# Patient Record
Sex: MALE | Race: WHITE | Employment: UNEMPLOYED | URBAN - METROPOLITAN AREA
[De-identification: names, ages, dates, MRNs, and addresses within clinical notes are randomized per-mention and may not be internally consistent; named-entity substitution may affect disease eponyms.]

---

## 2023-08-14 ENCOUNTER — OFFICE VISIT (OUTPATIENT)
Dept: FAMILY MEDICINE CLINIC | Age: 68
End: 2023-08-14
Payer: MEDICARE

## 2023-08-14 VITALS
OXYGEN SATURATION: 95 % | BODY MASS INDEX: 37.27 KG/M2 | TEMPERATURE: 97 F | SYSTOLIC BLOOD PRESSURE: 174 MMHG | HEART RATE: 78 BPM | RESPIRATION RATE: 16 BRPM | HEIGHT: 71 IN | WEIGHT: 266.2 LBS | DIASTOLIC BLOOD PRESSURE: 100 MMHG

## 2023-08-14 DIAGNOSIS — T30.0 SKIN BURN: Primary | ICD-10-CM

## 2023-08-14 PROCEDURE — 99214 OFFICE O/P EST MOD 30 MIN: CPT | Performed by: NURSE PRACTITIONER

## 2023-08-14 PROCEDURE — 1123F ACP DISCUSS/DSCN MKR DOCD: CPT | Performed by: NURSE PRACTITIONER

## 2023-08-14 RX ORDER — SULFAMETHOXAZOLE AND TRIMETHOPRIM 800; 160 MG/1; MG/1
1 TABLET ORAL 2 TIMES DAILY
Qty: 20 TABLET | Refills: 0 | Status: SHIPPED | OUTPATIENT
Start: 2023-08-14 | End: 2023-08-24
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
## 2023-09-20 ENCOUNTER — OFFICE VISIT (OUTPATIENT)
Dept: FAMILY MEDICINE CLINIC | Age: 68
End: 2023-09-20
Payer: MEDICARE

## 2023-09-20 VITALS
RESPIRATION RATE: 18 BRPM | BODY MASS INDEX: 37.24 KG/M2 | TEMPERATURE: 97.4 F | OXYGEN SATURATION: 93 % | DIASTOLIC BLOOD PRESSURE: 76 MMHG | WEIGHT: 266 LBS | HEIGHT: 71 IN | HEART RATE: 77 BPM | SYSTOLIC BLOOD PRESSURE: 130 MMHG

## 2023-09-20 DIAGNOSIS — L03.90 WOUND CELLULITIS: Primary | ICD-10-CM

## 2023-09-20 DIAGNOSIS — Z91.199 NON-COMPLIANCE WITH TREATMENT: ICD-10-CM

## 2023-09-20 PROCEDURE — 4004F PT TOBACCO SCREEN RCVD TLK: CPT

## 2023-09-20 PROCEDURE — 3017F COLORECTAL CA SCREEN DOC REV: CPT

## 2023-09-20 PROCEDURE — 1123F ACP DISCUSS/DSCN MKR DOCD: CPT

## 2023-09-20 PROCEDURE — G8417 CALC BMI ABV UP PARAM F/U: HCPCS

## 2023-09-20 PROCEDURE — G8427 DOCREV CUR MEDS BY ELIG CLIN: HCPCS

## 2023-09-20 PROCEDURE — 99214 OFFICE O/P EST MOD 30 MIN: CPT

## 2023-09-20 RX ORDER — SULFAMETHOXAZOLE AND TRIMETHOPRIM 800; 160 MG/1; MG/1
1 TABLET ORAL 2 TIMES DAILY
Qty: 14 TABLET | Refills: 0 | Status: SHIPPED | OUTPATIENT
Start: 2023-09-20 | End: 2023-09-27

## 2023-09-20 SDOH — ECONOMIC STABILITY: HOUSING INSECURITY
IN THE LAST 12 MONTHS, WAS THERE A TIME WHEN YOU DID NOT HAVE A STEADY PLACE TO SLEEP OR SLEPT IN A SHELTER (INCLUDING NOW)?: NO

## 2023-09-20 SDOH — ECONOMIC STABILITY: FOOD INSECURITY: WITHIN THE PAST 12 MONTHS, YOU WORRIED THAT YOUR FOOD WOULD RUN OUT BEFORE YOU GOT MONEY TO BUY MORE.: NEVER TRUE

## 2023-09-20 SDOH — ECONOMIC STABILITY: INCOME INSECURITY: HOW HARD IS IT FOR YOU TO PAY FOR THE VERY BASICS LIKE FOOD, HOUSING, MEDICAL CARE, AND HEATING?: NOT HARD AT ALL

## 2023-09-20 SDOH — ECONOMIC STABILITY: FOOD INSECURITY: WITHIN THE PAST 12 MONTHS, THE FOOD YOU BOUGHT JUST DIDN'T LAST AND YOU DIDN'T HAVE MONEY TO GET MORE.: NEVER TRUE

## 2023-09-20 NOTE — PROGRESS NOTES
walk-in, additionally, I did educate that antibiotics and clearing infection would improve pain. I did offer and recommend x-ray as well as IM Rocephin antibiotics. He declined and is also refusing wound care. Did still place wound care order in case patient changed her mind. ED immediately with the development of fever, body aches, shaking chills, spreading erythema, lymphangitic streaking, lethargy, CP, or SOB. Pt is in agreement with this care plan. All questions answered. SOCO Xie    **This report was transcribed using voice recognition software. Every effort was made to ensure accuracy; however, inadvertent computerized transcription errors may be present.

## 2023-10-12 ENCOUNTER — HOSPITAL ENCOUNTER (INPATIENT)
Age: 68
LOS: 1 days | Discharge: LEFT BEFORE DISCHARGE | End: 2023-10-12
Attending: EMERGENCY MEDICINE | Admitting: STUDENT IN AN ORGANIZED HEALTH CARE EDUCATION/TRAINING PROGRAM
Payer: MEDICARE

## 2023-10-12 ENCOUNTER — APPOINTMENT (OUTPATIENT)
Dept: GENERAL RADIOLOGY | Age: 68
End: 2023-10-12
Payer: MEDICARE

## 2023-10-12 VITALS
RESPIRATION RATE: 22 BRPM | WEIGHT: 230 LBS | SYSTOLIC BLOOD PRESSURE: 190 MMHG | BODY MASS INDEX: 32.08 KG/M2 | HEART RATE: 83 BPM | TEMPERATURE: 97.4 F | DIASTOLIC BLOOD PRESSURE: 100 MMHG | OXYGEN SATURATION: 93 %

## 2023-10-12 DIAGNOSIS — T24.201A PARTIAL THICKNESS BURN OF RIGHT LOWER EXTREMITY, INITIAL ENCOUNTER: Primary | ICD-10-CM

## 2023-10-12 DIAGNOSIS — T24.202A SECOND DEGREE BURN OF LEFT LEG, INITIAL ENCOUNTER: ICD-10-CM

## 2023-10-12 DIAGNOSIS — T14.8XXA WOUND INFECTION: ICD-10-CM

## 2023-10-12 DIAGNOSIS — E87.1 HYPONATREMIA: ICD-10-CM

## 2023-10-12 DIAGNOSIS — L08.9 WOUND INFECTION: ICD-10-CM

## 2023-10-12 PROBLEM — L97.914: Status: ACTIVE | Noted: 2023-10-12

## 2023-10-12 PROBLEM — L97.924: Status: ACTIVE | Noted: 2023-10-12

## 2023-10-12 LAB
ALBUMIN SERPL-MCNC: 4.1 G/DL (ref 3.5–5.2)
ALP SERPL-CCNC: 105 U/L (ref 40–129)
ALT SERPL-CCNC: 54 U/L (ref 0–40)
ANION GAP SERPL CALCULATED.3IONS-SCNC: 15 MMOL/L (ref 7–16)
AST SERPL-CCNC: 42 U/L (ref 0–39)
BASOPHILS # BLD: 0.14 K/UL (ref 0–0.2)
BASOPHILS NFR BLD: 1 % (ref 0–2)
BILIRUB SERPL-MCNC: 0.2 MG/DL (ref 0–1.2)
BUN SERPL-MCNC: 13 MG/DL (ref 6–23)
CALCIUM SERPL-MCNC: 9.9 MG/DL (ref 8.6–10.2)
CHLORIDE SERPL-SCNC: 87 MMOL/L (ref 98–107)
CO2 SERPL-SCNC: 26 MMOL/L (ref 22–29)
CREAT SERPL-MCNC: 0.9 MG/DL (ref 0.7–1.2)
CRP SERPL HS-MCNC: 179 MG/L (ref 0–5)
EOSINOPHIL # BLD: 0.23 K/UL (ref 0.05–0.5)
EOSINOPHILS RELATIVE PERCENT: 1 % (ref 0–6)
ERYTHROCYTE [DISTWIDTH] IN BLOOD BY AUTOMATED COUNT: 14 % (ref 11.5–15)
ERYTHROCYTE [SEDIMENTATION RATE] IN BLOOD BY WESTERGREN METHOD: 80 MM/HR (ref 0–15)
GFR SERPL CREATININE-BSD FRML MDRD: >60 ML/MIN/1.73M2
GLUCOSE SERPL-MCNC: 112 MG/DL (ref 74–99)
HCT VFR BLD AUTO: 41.8 % (ref 37–54)
HGB BLD-MCNC: 14.1 G/DL (ref 12.5–16.5)
IMM GRANULOCYTES # BLD AUTO: 0.47 K/UL (ref 0–0.58)
IMM GRANULOCYTES NFR BLD: 3 % (ref 0–5)
LYMPHOCYTES NFR BLD: 3.29 K/UL (ref 1.5–4)
LYMPHOCYTES RELATIVE PERCENT: 18 % (ref 20–42)
MCH RBC QN AUTO: 30.2 PG (ref 26–35)
MCHC RBC AUTO-ENTMCNC: 33.7 G/DL (ref 32–34.5)
MCV RBC AUTO: 89.5 FL (ref 80–99.9)
MONOCYTES NFR BLD: 0.66 K/UL (ref 0.1–0.95)
MONOCYTES NFR BLD: 4 % (ref 2–12)
NEUTROPHILS NFR BLD: 73 % (ref 43–80)
NEUTS SEG NFR BLD: 13.13 K/UL (ref 1.8–7.3)
PLATELET # BLD AUTO: 446 K/UL (ref 130–450)
PMV BLD AUTO: 8.9 FL (ref 7–12)
POTASSIUM SERPL-SCNC: 4.5 MMOL/L (ref 3.5–5)
PROT SERPL-MCNC: 8.7 G/DL (ref 6.4–8.3)
RBC # BLD AUTO: 4.67 M/UL (ref 3.8–5.8)
SODIUM SERPL-SCNC: 128 MMOL/L (ref 132–146)
WBC OTHER # BLD: 17.9 K/UL (ref 4.5–11.5)

## 2023-10-12 PROCEDURE — 80053 COMPREHEN METABOLIC PANEL: CPT

## 2023-10-12 PROCEDURE — 99285 EMERGENCY DEPT VISIT HI MDM: CPT

## 2023-10-12 PROCEDURE — 87040 BLOOD CULTURE FOR BACTERIA: CPT

## 2023-10-12 PROCEDURE — 87154 CUL TYP ID BLD PTHGN 6+ TRGT: CPT

## 2023-10-12 PROCEDURE — 86140 C-REACTIVE PROTEIN: CPT

## 2023-10-12 PROCEDURE — 90714 TD VACC NO PRESV 7 YRS+ IM: CPT | Performed by: PHYSICIAN ASSISTANT

## 2023-10-12 PROCEDURE — 86403 PARTICLE AGGLUT ANTBDY SCRN: CPT

## 2023-10-12 PROCEDURE — 85652 RBC SED RATE AUTOMATED: CPT

## 2023-10-12 PROCEDURE — 6360000002 HC RX W HCPCS: Performed by: PHYSICIAN ASSISTANT

## 2023-10-12 PROCEDURE — 96372 THER/PROPH/DIAG INJ SC/IM: CPT

## 2023-10-12 PROCEDURE — 2580000003 HC RX 258: Performed by: PHYSICIAN ASSISTANT

## 2023-10-12 PROCEDURE — 87070 CULTURE OTHR SPECIMN AEROBIC: CPT

## 2023-10-12 PROCEDURE — 87205 SMEAR GRAM STAIN: CPT

## 2023-10-12 PROCEDURE — 85025 COMPLETE CBC W/AUTO DIFF WBC: CPT

## 2023-10-12 PROCEDURE — 90471 IMMUNIZATION ADMIN: CPT | Performed by: PHYSICIAN ASSISTANT

## 2023-10-12 PROCEDURE — 73590 X-RAY EXAM OF LOWER LEG: CPT

## 2023-10-12 PROCEDURE — 73610 X-RAY EXAM OF ANKLE: CPT

## 2023-10-12 PROCEDURE — 87075 CULTR BACTERIA EXCEPT BLOOD: CPT

## 2023-10-12 PROCEDURE — 96365 THER/PROPH/DIAG IV INF INIT: CPT

## 2023-10-12 PROCEDURE — 1200000000 HC SEMI PRIVATE

## 2023-10-12 RX ORDER — ENOXAPARIN SODIUM 100 MG/ML
30 INJECTION SUBCUTANEOUS 2 TIMES DAILY
Status: DISCONTINUED | OUTPATIENT
Start: 2023-10-12 | End: 2023-10-12 | Stop reason: HOSPADM

## 2023-10-12 RX ORDER — SODIUM CHLORIDE 9 MG/ML
INJECTION, SOLUTION INTRAVENOUS PRN
Status: DISCONTINUED | OUTPATIENT
Start: 2023-10-12 | End: 2023-10-12 | Stop reason: HOSPADM

## 2023-10-12 RX ORDER — SODIUM CHLORIDE 0.9 % (FLUSH) 0.9 %
5-40 SYRINGE (ML) INJECTION PRN
Status: DISCONTINUED | OUTPATIENT
Start: 2023-10-12 | End: 2023-10-12 | Stop reason: HOSPADM

## 2023-10-12 RX ORDER — CLINDAMYCIN HYDROCHLORIDE 300 MG/1
300 CAPSULE ORAL 3 TIMES DAILY
Qty: 30 CAPSULE | Refills: 0 | Status: SHIPPED | OUTPATIENT
Start: 2023-10-12 | End: 2023-10-22

## 2023-10-12 RX ORDER — HYDRALAZINE HYDROCHLORIDE 20 MG/ML
10 INJECTION INTRAMUSCULAR; INTRAVENOUS EVERY 6 HOURS PRN
Status: DISCONTINUED | OUTPATIENT
Start: 2023-10-12 | End: 2023-10-12 | Stop reason: HOSPADM

## 2023-10-12 RX ORDER — SODIUM CHLORIDE, SODIUM LACTATE, POTASSIUM CHLORIDE, CALCIUM CHLORIDE 600; 310; 30; 20 MG/100ML; MG/100ML; MG/100ML; MG/100ML
INJECTION, SOLUTION INTRAVENOUS CONTINUOUS
Status: DISCONTINUED | OUTPATIENT
Start: 2023-10-12 | End: 2023-10-12 | Stop reason: HOSPADM

## 2023-10-12 RX ORDER — ONDANSETRON 2 MG/ML
4 INJECTION INTRAMUSCULAR; INTRAVENOUS EVERY 6 HOURS PRN
Status: DISCONTINUED | OUTPATIENT
Start: 2023-10-12 | End: 2023-10-12 | Stop reason: HOSPADM

## 2023-10-12 RX ORDER — ACETAMINOPHEN 650 MG/1
650 SUPPOSITORY RECTAL EVERY 6 HOURS PRN
Status: DISCONTINUED | OUTPATIENT
Start: 2023-10-12 | End: 2023-10-12 | Stop reason: HOSPADM

## 2023-10-12 RX ORDER — TETANUS AND DIPHTHERIA TOXOIDS ADSORBED 2; 2 [LF]/.5ML; [LF]/.5ML
0.5 INJECTION INTRAMUSCULAR ONCE
Status: COMPLETED | OUTPATIENT
Start: 2023-10-12 | End: 2023-10-12

## 2023-10-12 RX ORDER — SODIUM CHLORIDE 0.9 % (FLUSH) 0.9 %
5-40 SYRINGE (ML) INJECTION EVERY 12 HOURS SCHEDULED
Status: DISCONTINUED | OUTPATIENT
Start: 2023-10-12 | End: 2023-10-12 | Stop reason: HOSPADM

## 2023-10-12 RX ORDER — ONDANSETRON 4 MG/1
4 TABLET, ORALLY DISINTEGRATING ORAL EVERY 8 HOURS PRN
Status: DISCONTINUED | OUTPATIENT
Start: 2023-10-12 | End: 2023-10-12 | Stop reason: HOSPADM

## 2023-10-12 RX ORDER — ACETAMINOPHEN 325 MG/1
650 TABLET ORAL EVERY 6 HOURS PRN
Status: DISCONTINUED | OUTPATIENT
Start: 2023-10-12 | End: 2023-10-12 | Stop reason: HOSPADM

## 2023-10-12 RX ADMIN — CEFEPIME 2000 MG: 2 INJECTION, POWDER, FOR SOLUTION INTRAVENOUS at 14:39

## 2023-10-12 RX ADMIN — TETANUS AND DIPHTHERIA TOXOIDS ADSORBED 0.5 ML: 2; 2 INJECTION INTRAMUSCULAR at 14:41
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
## 2023-10-12 NOTE — ED NOTES
Bilateral leg wounds cleaned and wrapped. Patient decided to sign out AMA despite Nicole WAN reviewing risks of signing out. Patient wheeled out to vehicle.      Patricia Helm, SAI  10/12/23 [id number]

## 2023-10-12 NOTE — PROGRESS NOTES
Pharmacy Consultation Note  (Antibiotic Dosing and Monitoring)    Initial consult date: 10/12/23  Consulting physician/provider: Dr. Seven Appiah  Drug: Vancomycin  Indication: SST infx    Age/  Gender Height Weight IBW  Allergy Information   68 y.o./male   230 lb (104.3 kg)     Ideal body weight: 75.3 kg (166 lb 0.1 oz)  Adjusted ideal body weight: 86.9 kg (191 lb 9.7 oz)   Metoprolol and Pcn [penicillins]      Renal Function:  Recent Labs     10/12/23  1420   BUN 13   CREATININE 0.9     No intake or output data in the 24 hours ending 10/12/23 1751    Vancomycin Monitoring:  Trough:  No results for input(s): \"VANCOTROUGH\" in the last 72 hours. Random:  No results for input(s): \"VANCORANDOM\" in the last 72 hours. No results for input(s): \"BLOODCULT2\" in the last 72 hours. Historical Cultures:  No results found for: \"ORG\"  No results for input(s): \"BC\" in the last 72 hours. Vancomycin Administration Times:  Recent vancomycin administrations        No vancomycin IV orders with administrations found. Orders not given:            vancomycin (VANCOCIN) 2,000 mg in sodium chloride 0.9 % 500 mL IVPB    vancomycin (VANCOCIN) 1,250 mg in sodium chloride 0.9 % 250 mL IVPB                    Assessment:  Patient is a 79 y.o. male who has been initiated on vancomycin  Estimated Creatinine Clearance: 99 mL/min (based on SCr of 0.9 mg/dL).   To dose vancomycin, pharmacy will be utilizing [address] calculation software for goal AUC/CATHY 400-600 mg/L-hr (predicted AUC/CATHY = 550, Tr =17.9)    Plan:  Vanco 2000 mg loading dose  Will continue vancomycin 1250 mg IV every 12 hours  Will check vancomycin levels when appropriate  Will continue to monitor renal function   Pharmacy to follow      Calli Hines [facility] 10/12/2023 5:51 PM
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
## 2023-10-13 NOTE — ED NOTES
Patient called regarding positive blood cultures 10/13/2023 @ 1946 . No answer, voice mail left with call back number .  Dr Harman/Dr Luan Will notified      Marie SAI Silverio  10/13/23 0508

## 2023-10-14 LAB
MICROORGANISM SPEC CULT: ABNORMAL
MICROORGANISM SPEC CULT: ABNORMAL
MICROORGANISM SPEC CULT: NORMAL
MICROORGANISM/AGENT SPEC: ABNORMAL
SPECIMEN DESCRIPTION: ABNORMAL
SPECIMEN DESCRIPTION: NORMAL

## 2023-10-15 LAB
ACB COMPLEX DNA BLD POS QL NAA+NON-PROBE: NOT DETECTED
B FRAGILIS DNA BLD POS QL NAA+NON-PROBE: NOT DETECTED
C ALBICANS DNA BLD POS QL NAA+NON-PROBE: NOT DETECTED
C AURIS DNA BLD POS QL NAA+NON-PROBE: NOT DETECTED
C GATTII+NEOFOR DNA BLD POS QL NAA+N-PRB: NOT DETECTED
C GLABRATA DNA BLD POS QL NAA+NON-PROBE: NOT DETECTED
C KRUSEI DNA BLD POS QL NAA+NON-PROBE: NOT DETECTED
C PARAP DNA BLD POS QL NAA+NON-PROBE: NOT DETECTED
C TROPICLS DNA BLD POS QL NAA+NON-PROBE: NOT DETECTED
E CLOAC COMP DNA BLD POS NAA+NON-PROBE: NOT DETECTED
E COLI DNA BLD POS QL NAA+NON-PROBE: NOT DETECTED
E FAECALIS DNA BLD POS QL NAA+NON-PROBE: NOT DETECTED
E FAECIUM DNA BLD POS QL NAA+NON-PROBE: NOT DETECTED
ENTEROBACTERALES DNA BLD POS NAA+N-PRB: NOT DETECTED
GP B STREP DNA BLD POS QL NAA+NON-PROBE: NOT DETECTED
HAEM INFLU DNA BLD POS QL NAA+NON-PROBE: NOT DETECTED
K OXYTOCA DNA BLD POS QL NAA+NON-PROBE: NOT DETECTED
KLEBSIELLA SP DNA BLD POS QL NAA+NON-PRB: NOT DETECTED
KLEBSIELLA SP DNA BLD POS QL NAA+NON-PRB: NOT DETECTED
L MONOCYTOG DNA BLD POS QL NAA+NON-PROBE: NOT DETECTED
MICROORGANISM SPEC CULT: ABNORMAL
MICROORGANISM SPEC CULT: ABNORMAL
MICROORGANISM/AGENT SPEC: ABNORMAL
MICROORGANISM/AGENT SPEC: ABNORMAL
N MEN DNA BLD POS QL NAA+NON-PROBE: NOT DETECTED
P AERUGINOSA DNA BLD POS NAA+NON-PROBE: NOT DETECTED
PROTEUS SP DNA BLD POS QL NAA+NON-PROBE: NOT DETECTED
S AUREUS DNA BLD POS QL NAA+NON-PROBE: NOT DETECTED
S AUREUS+CONS DNA BLD POS NAA+NON-PROBE: NOT DETECTED
S EPIDERMIDIS DNA BLD POS QL NAA+NON-PRB: NOT DETECTED
S LUGDUNENSIS DNA BLD POS QL NAA+NON-PRB: NOT DETECTED
S MALTOPHILIA DNA BLD POS QL NAA+NON-PRB: NOT DETECTED
S MARCESCENS DNA BLD POS NAA+NON-PROBE: NOT DETECTED
S PNEUM DNA BLD POS QL NAA+NON-PROBE: NOT DETECTED
S PYO DNA BLD POS QL NAA+NON-PROBE: NOT DETECTED
SALMONELLA DNA BLD POS QL NAA+NON-PROBE: NOT DETECTED
SERVICE CMNT-IMP: ABNORMAL
SERVICE CMNT-IMP: ABNORMAL
SPECIMEN DESCRIPTION: ABNORMAL
SPECIMEN DESCRIPTION: ABNORMAL
STREPTOCOCCUS DNA BLD POS NAA+NON-PROBE: NOT DETECTED

## 2023-10-17 NOTE — DISCHARGE SUMMARY
cyanosis, edema of bilateral lower extremities. Brisk capillary refill. Right lower leg anterior medial aspect with large open wound with ulcer, blackish, dark base malodorous with bloody and purulent discharge  Left leg with necrotic blackish ulcerative eschar with surrounding redness and yellowish-whitish base  Skin: Normal skin color. No rashes or lesions. Neurologic:  Neurovascularly intact without any focal sensory/motor deficits. Cranial nerves: II-XII intact, grossly non-focal.  Psychiatric: Alert and oriented, thought content appropriate, normal insight      Consults:     IP CONSULT TO INTERNAL MEDICINE  IP CONSULT TO GENERAL SURGERY    Significant Diagnostic Studies:   X-ray    Disposition: Left AMA    Discharge Instructions/Follow-up:  -    Code Status:  Prior     Activity: activity as tolerated    Diet:     Condition: Critical, patient left AMA    Labs: For convenience and continuity at follow-up the following most recent labs are provided:      CBC:    Lab Results   Component Value Date/Time    WBC 17.9 10/12/2023 02:20 PM    HGB 14.1 10/12/2023 02:20 PM    HCT 41.8 10/12/2023 02:20 PM     10/12/2023 02:20 PM       Renal:    Lab Results   Component Value Date/Time     10/12/2023 02:20 PM    K 4.5 10/12/2023 02:20 PM    CL 87 10/12/2023 02:20 PM    CO2 26 10/12/2023 02:20 PM    BUN 13 10/12/2023 02:20 PM    CREATININE 0.9 10/12/2023 02:20 PM    CALCIUM 9.9 10/12/2023 02:20 PM       Discharge Medications:     Discharge Medication List as of 10/12/2023  5:53 PM             Details   clindamycin (CLEOCIN) 300 MG capsule Take 1 capsule by mouth 3 times daily for 10 days If not covered by insurance or too costly for patient may substitute clindamycin  mg Caps, 2 caps TID ,QS for 10 days. , Disp-30 capsule, R-0Normal                Details   silver sulfADIAZINE (SILVADENE) 1 % cream Apply topically twice daily. , Disp-400 g, R-0, Normal             Time Spent on discharge is more than 15

## 2023-11-11 LAB
SEND OUT REPORT: NORMAL
TEST NAME: NORMAL

## 2024-02-26 ENCOUNTER — HOSPITAL ENCOUNTER (INPATIENT)
Age: 69
LOS: 6 days | Discharge: NURSING, REHAB OR LONG-TERM CARE | DRG: 474 | End: 2024-03-04
Attending: EMERGENCY MEDICINE | Admitting: INTERNAL MEDICINE
Payer: MEDICARE

## 2024-02-26 DIAGNOSIS — M72.6 NECROTIZING FASCIITIS (HCC): Primary | ICD-10-CM

## 2024-02-26 PROCEDURE — 80179 DRUG ASSAY SALICYLATE: CPT

## 2024-02-26 PROCEDURE — 6360000002 HC RX W HCPCS: Performed by: STUDENT IN AN ORGANIZED HEALTH CARE EDUCATION/TRAINING PROGRAM

## 2024-02-26 PROCEDURE — 83735 ASSAY OF MAGNESIUM: CPT

## 2024-02-26 PROCEDURE — 87636 SARSCOV2 & INF A&B AMP PRB: CPT

## 2024-02-26 PROCEDURE — 86140 C-REACTIVE PROTEIN: CPT

## 2024-02-26 PROCEDURE — G0480 DRUG TEST DEF 1-7 CLASSES: HCPCS

## 2024-02-26 PROCEDURE — 83605 ASSAY OF LACTIC ACID: CPT

## 2024-02-26 PROCEDURE — 83690 ASSAY OF LIPASE: CPT

## 2024-02-26 PROCEDURE — 87154 CUL TYP ID BLD PTHGN 6+ TRGT: CPT

## 2024-02-26 PROCEDURE — 99285 EMERGENCY DEPT VISIT HI MDM: CPT

## 2024-02-26 PROCEDURE — 80143 DRUG ASSAY ACETAMINOPHEN: CPT

## 2024-02-26 PROCEDURE — 87077 CULTURE AEROBIC IDENTIFY: CPT

## 2024-02-26 PROCEDURE — 84443 ASSAY THYROID STIM HORMONE: CPT

## 2024-02-26 PROCEDURE — 85025 COMPLETE CBC W/AUTO DIFF WBC: CPT

## 2024-02-26 PROCEDURE — 85610 PROTHROMBIN TIME: CPT

## 2024-02-26 PROCEDURE — 85652 RBC SED RATE AUTOMATED: CPT

## 2024-02-26 PROCEDURE — 84484 ASSAY OF TROPONIN QUANT: CPT

## 2024-02-26 PROCEDURE — 80053 COMPREHEN METABOLIC PANEL: CPT

## 2024-02-26 PROCEDURE — 82140 ASSAY OF AMMONIA: CPT

## 2024-02-26 PROCEDURE — 96365 THER/PROPH/DIAG IV INF INIT: CPT

## 2024-02-26 PROCEDURE — 87040 BLOOD CULTURE FOR BACTERIA: CPT

## 2024-02-26 PROCEDURE — 80307 DRUG TEST PRSMV CHEM ANLYZR: CPT

## 2024-02-26 RX ORDER — CLINDAMYCIN PHOSPHATE 900 MG/50ML
900 INJECTION, SOLUTION INTRAVENOUS ONCE
Status: COMPLETED | OUTPATIENT
Start: 2024-02-26 | End: 2024-02-27

## 2024-02-26 RX ORDER — TRANEXAMIC ACID 100 MG/ML
INJECTION, SOLUTION INTRAVENOUS
Status: DISPENSED
Start: 2024-02-26 | End: 2024-02-27

## 2024-02-26 RX ADMIN — CLINDAMYCIN IN 5 PERCENT DEXTROSE 900 MG: 18 INJECTION, SOLUTION INTRAVENOUS at 23:55

## 2024-02-26 ASSESSMENT — PAIN - FUNCTIONAL ASSESSMENT: PAIN_FUNCTIONAL_ASSESSMENT: NONE - DENIES PAIN

## 2024-02-27 ENCOUNTER — APPOINTMENT (OUTPATIENT)
Dept: GENERAL RADIOLOGY | Age: 69
DRG: 474 | End: 2024-02-27
Payer: MEDICARE

## 2024-02-27 ENCOUNTER — ANESTHESIA (OUTPATIENT)
Dept: OPERATING ROOM | Age: 69
End: 2024-02-27
Payer: MEDICARE

## 2024-02-27 ENCOUNTER — APPOINTMENT (OUTPATIENT)
Dept: CT IMAGING | Age: 69
DRG: 474 | End: 2024-02-27
Payer: MEDICARE

## 2024-02-27 ENCOUNTER — ANESTHESIA EVENT (OUTPATIENT)
Dept: OPERATING ROOM | Age: 69
End: 2024-02-27
Payer: MEDICARE

## 2024-02-27 PROBLEM — E87.1 HYPONATREMIA: Status: ACTIVE | Noted: 2024-02-27

## 2024-02-27 PROBLEM — L03.119 CELLULITIS OF FOOT: Status: ACTIVE | Noted: 2024-02-27

## 2024-02-27 PROBLEM — M72.6 NECROTIZING FASCIITIS (HCC): Status: ACTIVE | Noted: 2024-02-27

## 2024-02-27 LAB
AADO2: 253.3 MMHG
ALBUMIN SERPL-MCNC: 2.4 G/DL (ref 3.5–5.2)
ALBUMIN SERPL-MCNC: 2.9 G/DL (ref 3.5–5.2)
ALP SERPL-CCNC: 77 U/L (ref 40–129)
ALP SERPL-CCNC: 94 U/L (ref 40–129)
ALT SERPL-CCNC: 11 U/L (ref 0–40)
ALT SERPL-CCNC: 16 U/L (ref 0–40)
AMMONIA PLAS-SCNC: <10 UMOL/L (ref 16–60)
AMPHET UR QL SCN: NEGATIVE
ANION GAP SERPL CALCULATED.3IONS-SCNC: 10 MMOL/L (ref 7–16)
ANION GAP SERPL CALCULATED.3IONS-SCNC: 11 MMOL/L (ref 7–16)
ANION GAP SERPL CALCULATED.3IONS-SCNC: 17 MMOL/L (ref 7–16)
ANION GAP SERPL CALCULATED.3IONS-SCNC: 7 MMOL/L (ref 7–16)
ANION GAP SERPL CALCULATED.3IONS-SCNC: 7 MMOL/L (ref 7–16)
ANION GAP SERPL CALCULATED.3IONS-SCNC: 8 MMOL/L (ref 7–16)
ANION GAP SERPL CALCULATED.3IONS-SCNC: 9 MMOL/L (ref 7–16)
ANION GAP SERPL CALCULATED.3IONS-SCNC: NORMAL MMOL/L (ref 9–17)
APAP SERPL-MCNC: <5 UG/ML (ref 10–30)
AST SERPL-CCNC: 17 U/L (ref 0–39)
AST SERPL-CCNC: 20 U/L (ref 0–39)
B.E.: -0.2 MMOL/L (ref -3–3)
BARBITURATES UR QL SCN: NEGATIVE
BASOPHILS # BLD: 0 K/UL (ref 0–0.2)
BASOPHILS # BLD: 0.06 K/UL (ref 0–0.2)
BASOPHILS # BLD: 0.07 K/UL (ref 0–0.2)
BASOPHILS NFR BLD: 0 % (ref 0–2)
BENZODIAZ UR QL: NEGATIVE
BILIRUB SERPL-MCNC: 0.2 MG/DL (ref 0–1.2)
BILIRUB SERPL-MCNC: 0.5 MG/DL (ref 0–1.2)
BNP SERPL-MCNC: 242 PG/ML (ref 0–125)
BUN BLD-MCNC: 12 MG/DL (ref 6–23)
BUN SERPL-MCNC: 11 MG/DL (ref 6–23)
BUN SERPL-MCNC: 11 MG/DL (ref 6–23)
BUN SERPL-MCNC: 12 MG/DL (ref 6–23)
BUN SERPL-MCNC: 13 MG/DL (ref 6–23)
BUN SERPL-MCNC: 14 MG/DL (ref 6–23)
BUN SERPL-MCNC: NORMAL MG/DL (ref 8–23)
BUN/CREAT SERPL: NORMAL (ref 9–20)
BUPRENORPHINE UR QL: NEGATIVE
CA-I BLD-SCNC: 1.09 MMOL/L (ref 1.15–1.33)
CA-I BLD-SCNC: 1.1 MMOL/L (ref 1.15–1.33)
CALCIUM SERPL-MCNC: 7.3 MG/DL (ref 8.6–10.2)
CALCIUM SERPL-MCNC: 7.5 MG/DL (ref 8.6–10.2)
CALCIUM SERPL-MCNC: 7.6 MG/DL (ref 8.6–10.2)
CALCIUM SERPL-MCNC: 7.6 MG/DL (ref 8.6–10.2)
CALCIUM SERPL-MCNC: 7.8 MG/DL (ref 8.6–10.2)
CALCIUM SERPL-MCNC: 8.4 MG/DL (ref 8.6–10.2)
CALCIUM SERPL-MCNC: 8.9 MG/DL (ref 8.6–10.2)
CALCIUM SERPL-MCNC: NORMAL MG/DL (ref 8.6–10.4)
CANNABINOIDS UR QL SCN: NEGATIVE
CHLORIDE BLD-SCNC: 87 MMOL/L (ref 100–108)
CHLORIDE SERPL-SCNC: 79 MMOL/L (ref 98–107)
CHLORIDE SERPL-SCNC: 83 MMOL/L (ref 98–107)
CHLORIDE SERPL-SCNC: 86 MMOL/L (ref 98–107)
CHLORIDE SERPL-SCNC: 87 MMOL/L (ref 98–107)
CHLORIDE SERPL-SCNC: 90 MMOL/L (ref 98–107)
CHLORIDE SERPL-SCNC: NORMAL MMOL/L (ref 98–107)
CK SERPL-CCNC: 128 U/L (ref 20–200)
CK SERPL-CCNC: 129 U/L (ref 20–200)
CO2 BLD CALC-SCNC: 22 MMOL/L (ref 22–29)
CO2 SERPL-SCNC: 22 MMOL/L (ref 22–29)
CO2 SERPL-SCNC: 23 MMOL/L (ref 22–29)
CO2 SERPL-SCNC: 24 MMOL/L (ref 22–29)
CO2 SERPL-SCNC: 25 MMOL/L (ref 22–29)
CO2 SERPL-SCNC: 26 MMOL/L (ref 22–29)
CO2 SERPL-SCNC: NORMAL MMOL/L (ref 20–31)
COCAINE UR QL SCN: NEGATIVE
COHB: 1.6 % (ref 0–1.5)
CREAT BLD-MCNC: 0.5 MG/DL (ref 0.7–1.2)
CREAT SERPL-MCNC: 0.6 MG/DL (ref 0.7–1.2)
CREAT SERPL-MCNC: 0.7 MG/DL (ref 0.7–1.2)
CREAT SERPL-MCNC: 0.7 MG/DL (ref 0.7–1.2)
CREAT SERPL-MCNC: NORMAL MG/DL (ref 0.7–1.2)
CREAT UR-MCNC: 130.1 MG/DL (ref 40–278)
CRITICAL: ABNORMAL
CRP SERPL HS-MCNC: 210 MG/L (ref 0–5)
DATE ANALYZED: ABNORMAL
DATE OF COLLECTION: ABNORMAL
EGFR, POC: >60 ML/MIN/1.73M2
EKG ATRIAL RATE: 86 BPM
EKG P AXIS: 40 DEGREES
EKG P-R INTERVAL: 200 MS
EKG Q-T INTERVAL: 360 MS
EKG QRS DURATION: 90 MS
EKG QTC CALCULATION (BAZETT): 430 MS
EKG R AXIS: 8 DEGREES
EKG T AXIS: 56 DEGREES
EKG VENTRICULAR RATE: 86 BPM
EOSINOPHIL # BLD: 0 K/UL (ref 0.05–0.5)
EOSINOPHIL # BLD: 0.01 K/UL (ref 0.05–0.5)
EOSINOPHIL # BLD: 0.05 K/UL (ref 0.05–0.5)
EOSINOPHILS RELATIVE PERCENT: 0 % (ref 0–6)
ERYTHROCYTE [DISTWIDTH] IN BLOOD BY AUTOMATED COUNT: 17.6 % (ref 11.5–15)
ERYTHROCYTE [DISTWIDTH] IN BLOOD BY AUTOMATED COUNT: 17.9 % (ref 11.5–15)
ERYTHROCYTE [DISTWIDTH] IN BLOOD BY AUTOMATED COUNT: 18 % (ref 11.5–15)
ERYTHROCYTE [SEDIMENTATION RATE] IN BLOOD BY WESTERGREN METHOD: 90 MM/HR (ref 0–15)
ETHANOLAMINE SERPL-MCNC: <10 MG/DL
FENTANYL UR QL: NEGATIVE
FERRITIN SERPL-MCNC: 154 NG/ML
FIO2: 100 %
FLUAV RNA RESP QL NAA+PROBE: NOT DETECTED
FLUBV RNA RESP QL NAA+PROBE: NOT DETECTED
GFR SERPL CREATININE-BSD FRML MDRD: >60 ML/MIN/1.73M2
GFR SERPL CREATININE-BSD FRML MDRD: NORMAL ML/MIN/1.73M2
GLUCOSE BLD-MCNC: 110 MG/DL (ref 74–99)
GLUCOSE BLD-MCNC: 90 MG/DL (ref 74–99)
GLUCOSE SERPL-MCNC: 109 MG/DL (ref 74–99)
GLUCOSE SERPL-MCNC: 114 MG/DL (ref 74–99)
GLUCOSE SERPL-MCNC: 118 MG/DL (ref 74–99)
GLUCOSE SERPL-MCNC: 119 MG/DL (ref 74–99)
GLUCOSE SERPL-MCNC: 83 MG/DL (ref 74–99)
GLUCOSE SERPL-MCNC: 91 MG/DL (ref 74–99)
GLUCOSE SERPL-MCNC: 97 MG/DL (ref 74–99)
GLUCOSE SERPL-MCNC: NORMAL MG/DL (ref 70–99)
HCO3 VENOUS: 22.6 MMOL/L (ref 22–26)
HCO3: 25.6 MMOL/L (ref 22–26)
HCT VFR BLD AUTO: 21.4 % (ref 37–54)
HCT VFR BLD AUTO: 22 % (ref 37–54)
HCT VFR BLD AUTO: 22.3 % (ref 37–54)
HCT VFR BLD AUTO: 23 % (ref 37–54)
HCT VFR BLD AUTO: 23.4 % (ref 37–54)
HCT VFR BLD AUTO: 27.5 % (ref 37–54)
HGB BLD-MCNC: 6.9 G/DL (ref 12.5–16.5)
HGB BLD-MCNC: 7.1 G/DL (ref 12.5–16.5)
HGB BLD-MCNC: 7.2 G/DL (ref 12.5–16.5)
HGB BLD-MCNC: 7.3 G/DL (ref 12.5–16.5)
HGB BLD-MCNC: 8.8 G/DL (ref 12.5–16.5)
HHB: 0.5 % (ref 0–5)
IMM GRANULOCYTES # BLD AUTO: 0.22 K/UL (ref 0–0.58)
IMM GRANULOCYTES # BLD AUTO: 0.24 K/UL (ref 0–0.58)
IMM GRANULOCYTES NFR BLD: 1 % (ref 0–5)
IMM GRANULOCYTES NFR BLD: 1 % (ref 0–5)
INR PPP: 1.5
IRON SATN MFR SERPL: 48 % (ref 20–55)
IRON SERPL-MCNC: 79 UG/DL (ref 59–158)
LAB: ABNORMAL
LACTATE BLDV-SCNC: 1.3 MMOL/L (ref 0.5–1.9)
LACTATE BLDV-SCNC: 1.6 MMOL/L (ref 0.5–1.9)
LIPASE SERPL-CCNC: 22 U/L (ref 13–60)
LYMPHOCYTES NFR BLD: 0.55 K/UL (ref 1.5–4)
LYMPHOCYTES NFR BLD: 2.6 K/UL (ref 1.5–4)
LYMPHOCYTES NFR BLD: 2.74 K/UL (ref 1.5–4)
LYMPHOCYTES RELATIVE PERCENT: 14 % (ref 20–42)
LYMPHOCYTES RELATIVE PERCENT: 15 % (ref 20–42)
LYMPHOCYTES RELATIVE PERCENT: 3 % (ref 20–42)
Lab: 1710
MAGNESIUM SERPL-MCNC: 1.8 MG/DL (ref 1.6–2.6)
MAGNESIUM SERPL-MCNC: 2 MG/DL (ref 1.6–2.6)
MCH RBC QN AUTO: 23.3 PG (ref 26–35)
MCH RBC QN AUTO: 23.3 PG (ref 26–35)
MCH RBC QN AUTO: 23.7 PG (ref 26–35)
MCHC RBC AUTO-ENTMCNC: 31.8 G/DL (ref 32–34.5)
MCHC RBC AUTO-ENTMCNC: 32 G/DL (ref 32–34.5)
MCHC RBC AUTO-ENTMCNC: 32.2 G/DL (ref 32–34.5)
MCV RBC AUTO: 72.3 FL (ref 80–99.9)
MCV RBC AUTO: 72.8 FL (ref 80–99.9)
MCV RBC AUTO: 74.3 FL (ref 80–99.9)
METHADONE UR QL: NEGATIVE
METHB: 0.1 % (ref 0–1.5)
MODE: AC
MONOCYTES NFR BLD: 0 % (ref 2–12)
MONOCYTES NFR BLD: 0 K/UL (ref 0.1–0.95)
MONOCYTES NFR BLD: 1.26 K/UL (ref 0.1–0.95)
MONOCYTES NFR BLD: 1.3 K/UL (ref 0.1–0.95)
MONOCYTES NFR BLD: 7 % (ref 2–12)
MONOCYTES NFR BLD: 7 % (ref 2–12)
NEGATIVE BASE EXCESS, VEN: 2.8 MMOL/L
NEUTROPHILS NFR BLD: 76 % (ref 43–80)
NEUTROPHILS NFR BLD: 77 % (ref 43–80)
NEUTROPHILS NFR BLD: 97 % (ref 43–80)
NEUTS SEG NFR BLD: 13.65 K/UL (ref 1.8–7.3)
NEUTS SEG NFR BLD: 14.19 K/UL (ref 1.8–7.3)
NEUTS SEG NFR BLD: 20.65 K/UL (ref 1.8–7.3)
O2 CONTENT: 12.1 ML/DL
O2 SAT, VEN: 95.7 %
O2 SATURATION: 99.5 % (ref 92–98.5)
O2HB: 97.8 % (ref 94–97)
OPERATOR ID: 1768
OPIATES UR QL SCN: NEGATIVE
OSMOLALITY SERPL: 251 MOSM/KG (ref 285–310)
OSMOLALITY UR: 251 MOSM/KG (ref 300–900)
OSMOLALITY UR: 537 MOSM/KG (ref 300–900)
OXYCODONE UR QL SCN: NEGATIVE
PATIENT TEMP: 37 C
PATIENT TEMP: 38.1
PCO2, VEN: 41 MM HG
PCO2: 47.8 MMHG (ref 35–45)
PCP UR QL SCN: NEGATIVE
PEEP/CPAP: 8 CMH2O
PFO2: 3.87 MMHG/%
PH BLOOD GAS: 7.35 (ref 7.35–7.45)
PH VENOUS: 7.35 (ref 7.35–7.45)
PHOSPHATE SERPL-MCNC: 3.4 MG/DL (ref 2.5–4.5)
PLATELET # BLD AUTO: 463 K/UL (ref 130–450)
PLATELET # BLD AUTO: 498 K/UL (ref 130–450)
PLATELET # BLD AUTO: 617 K/UL (ref 130–450)
PMV BLD AUTO: 8.2 FL (ref 7–12)
PMV BLD AUTO: 8.2 FL (ref 7–12)
PMV BLD AUTO: 8.3 FL (ref 7–12)
PO2, VEN: 84 MM HG
PO2: 386.9 MMHG (ref 75–100)
POC ANION GAP: 11 MMOL/L (ref 7–16)
POC HEMOGLOBIN (CALC): 7.8 G/DL (ref 12.5–15.5)
POC LACTIC ACID: 0.6 MMOL/L (ref 0.5–2.2)
POC PCO2 TEMP: 43 MM HG
POC PH TEMP: 7.33
POC PO2 TEMP: 90.2 MM HG
POTASSIUM BLD-SCNC: 4 MMOL/L (ref 3.5–5)
POTASSIUM SERPL-SCNC: 4.4 MMOL/L (ref 3.5–5)
POTASSIUM SERPL-SCNC: 4.6 MMOL/L (ref 3.5–5)
POTASSIUM SERPL-SCNC: 4.6 MMOL/L (ref 3.5–5)
POTASSIUM SERPL-SCNC: 4.7 MMOL/L (ref 3.5–5)
POTASSIUM SERPL-SCNC: 4.7 MMOL/L (ref 3.5–5)
POTASSIUM SERPL-SCNC: 4.9 MMOL/L (ref 3.5–5)
POTASSIUM SERPL-SCNC: 5.2 MMOL/L (ref 3.5–5)
POTASSIUM SERPL-SCNC: NORMAL MMOL/L (ref 3.7–5.3)
PROT SERPL-MCNC: 5.7 G/DL (ref 6.4–8.3)
PROT SERPL-MCNC: 7.1 G/DL (ref 6.4–8.3)
PROTHROMBIN TIME: 15.9 SEC (ref 9.3–12.4)
RBC # BLD AUTO: 2.96 M/UL (ref 3.8–5.8)
RBC # BLD AUTO: 3 M/UL (ref 3.8–5.8)
RBC # BLD AUTO: 3.78 M/UL (ref 3.8–5.8)
RBC # BLD: ABNORMAL 10*6/UL
RI(T): 0.65
RR MECHANICAL: 12 B/MIN
SALICYLATES SERPL-MCNC: <0.3 MG/DL (ref 0–30)
SARS-COV-2 RNA RESP QL NAA+PROBE: NOT DETECTED
SODIUM BLD-SCNC: 120 MMOL/L (ref 132–146)
SODIUM SERPL-SCNC: 117 MMOL/L (ref 132–146)
SODIUM SERPL-SCNC: 118 MMOL/L (ref 132–146)
SODIUM SERPL-SCNC: 119 MMOL/L (ref 132–146)
SODIUM SERPL-SCNC: 120 MMOL/L (ref 132–146)
SODIUM SERPL-SCNC: 120 MMOL/L (ref 132–146)
SODIUM SERPL-SCNC: 121 MMOL/L (ref 132–146)
SODIUM SERPL-SCNC: 123 MMOL/L (ref 132–146)
SODIUM SERPL-SCNC: NORMAL MMOL/L (ref 135–144)
SODIUM UR-SCNC: 24 MMOL/L
SODIUM UR-SCNC: <20 MMOL/L
SOURCE, BLOOD GAS: ABNORMAL
SOURCE: NORMAL
SPECIMEN DESCRIPTION: NORMAL
T4 FREE SERPL-MCNC: 0.8 NG/DL (ref 0.9–1.7)
TEST INFORMATION: NORMAL
THB: 8 G/DL (ref 11.5–16.5)
TIBC SERPL-MCNC: 164 UG/DL (ref 250–450)
TIME ANALYZED: 1712
TOTAL PROTEIN, URINE: 38 MG/DL (ref 0–12)
TOXIC TRICYCLIC SC,BLOOD: NEGATIVE
TROPONIN I SERPL HS-MCNC: 50 NG/L (ref 0–11)
TROPONIN I SERPL HS-MCNC: 57 NG/L (ref 0–11)
TROPONIN I SERPL HS-MCNC: 59 NG/L (ref 0–11)
TROPONIN I SERPL HS-MCNC: 68 NG/L (ref 0–11)
TROPONIN I SERPL HS-MCNC: NORMAL NG/L (ref 0–22)
TROPONIN INTERP: NORMAL
TROPONIN T SERPL-MCNC: NORMAL NG/ML
TSH SERPL DL<=0.05 MIU/L-ACNC: 18.61 UIU/ML (ref 0.27–4.2)
UUN UR-MCNC: 842 MG/DL (ref 800–1666)
VT MECHANICAL: 500 ML
WBC OTHER # BLD: 17.9 K/UL (ref 4.5–11.5)
WBC OTHER # BLD: 18.5 K/UL (ref 4.5–11.5)
WBC OTHER # BLD: 21.2 K/UL (ref 4.5–11.5)

## 2024-02-27 PROCEDURE — 80047 BASIC METABLC PNL IONIZED CA: CPT

## 2024-02-27 PROCEDURE — 6360000002 HC RX W HCPCS

## 2024-02-27 PROCEDURE — 6360000002 HC RX W HCPCS: Performed by: STUDENT IN AN ORGANIZED HEALTH CARE EDUCATION/TRAINING PROGRAM

## 2024-02-27 PROCEDURE — 2580000003 HC RX 258

## 2024-02-27 PROCEDURE — 71045 X-RAY EXAM CHEST 1 VIEW: CPT

## 2024-02-27 PROCEDURE — P9016 RBC LEUKOCYTES REDUCED: HCPCS

## 2024-02-27 PROCEDURE — 6370000000 HC RX 637 (ALT 250 FOR IP)

## 2024-02-27 PROCEDURE — 83935 ASSAY OF URINE OSMOLALITY: CPT

## 2024-02-27 PROCEDURE — 36620 INSERTION CATHETER ARTERY: CPT

## 2024-02-27 PROCEDURE — 5A1945Z RESPIRATORY VENTILATION, 24-96 CONSECUTIVE HOURS: ICD-10-PCS | Performed by: STUDENT IN AN ORGANIZED HEALTH CARE EDUCATION/TRAINING PROGRAM

## 2024-02-27 PROCEDURE — 82550 ASSAY OF CK (CPK): CPT

## 2024-02-27 PROCEDURE — 74018 RADEX ABDOMEN 1 VIEW: CPT

## 2024-02-27 PROCEDURE — 87077 CULTURE AEROBIC IDENTIFY: CPT

## 2024-02-27 PROCEDURE — 88311 DECALCIFY TISSUE: CPT

## 2024-02-27 PROCEDURE — 82962 GLUCOSE BLOOD TEST: CPT

## 2024-02-27 PROCEDURE — 87205 SMEAR GRAM STAIN: CPT

## 2024-02-27 PROCEDURE — 73700 CT LOWER EXTREMITY W/O DYE: CPT

## 2024-02-27 PROCEDURE — 86403 PARTICLE AGGLUT ANTBDY SCRN: CPT

## 2024-02-27 PROCEDURE — 80048 BASIC METABOLIC PNL TOTAL CA: CPT

## 2024-02-27 PROCEDURE — 37799 UNLISTED PX VASCULAR SURGERY: CPT

## 2024-02-27 PROCEDURE — 99291 CRITICAL CARE FIRST HOUR: CPT | Performed by: SURGERY

## 2024-02-27 PROCEDURE — 3600000013 HC SURGERY LEVEL 3 ADDTL 15MIN: Performed by: SURGERY

## 2024-02-27 PROCEDURE — 87081 CULTURE SCREEN ONLY: CPT

## 2024-02-27 PROCEDURE — 11045 DBRDMT SUBQ TISS EACH ADDL: CPT | Performed by: SURGERY

## 2024-02-27 PROCEDURE — 80053 COMPREHEN METABOLIC PANEL: CPT

## 2024-02-27 PROCEDURE — 86850 RBC ANTIBODY SCREEN: CPT

## 2024-02-27 PROCEDURE — 83930 ASSAY OF BLOOD OSMOLALITY: CPT

## 2024-02-27 PROCEDURE — 2580000003 HC RX 258: Performed by: INTERNAL MEDICINE

## 2024-02-27 PROCEDURE — 86901 BLOOD TYPING SEROLOGIC RH(D): CPT

## 2024-02-27 PROCEDURE — 11044 DBRDMT BONE 1ST 20 SQ CM/<: CPT | Performed by: SURGERY

## 2024-02-27 PROCEDURE — 82570 ASSAY OF URINE CREATININE: CPT

## 2024-02-27 PROCEDURE — 6360000002 HC RX W HCPCS: Performed by: INTERNAL MEDICINE

## 2024-02-27 PROCEDURE — 83550 IRON BINDING TEST: CPT

## 2024-02-27 PROCEDURE — 27882 AMPUTATION OF LOWER LEG: CPT | Performed by: SURGERY

## 2024-02-27 PROCEDURE — 73630 X-RAY EXAM OF FOOT: CPT

## 2024-02-27 PROCEDURE — 84439 ASSAY OF FREE THYROXINE: CPT

## 2024-02-27 PROCEDURE — 84156 ASSAY OF PROTEIN URINE: CPT

## 2024-02-27 PROCEDURE — 30233N1 TRANSFUSION OF NONAUTOLOGOUS RED BLOOD CELLS INTO PERIPHERAL VEIN, PERCUTANEOUS APPROACH: ICD-10-PCS | Performed by: STUDENT IN AN ORGANIZED HEALTH CARE EDUCATION/TRAINING PROGRAM

## 2024-02-27 PROCEDURE — 3700000001 HC ADD 15 MINUTES (ANESTHESIA): Performed by: SURGERY

## 2024-02-27 PROCEDURE — 87181 SC STD AGAR DILUTION PER AGT: CPT

## 2024-02-27 PROCEDURE — 82805 BLOOD GASES W/O2 SATURATION: CPT

## 2024-02-27 PROCEDURE — 5A1955Z RESPIRATORY VENTILATION, GREATER THAN 96 CONSECUTIVE HOURS: ICD-10-PCS | Performed by: STUDENT IN AN ORGANIZED HEALTH CARE EDUCATION/TRAINING PROGRAM

## 2024-02-27 PROCEDURE — 87185 SC STD ENZYME DETCJ PER NZM: CPT

## 2024-02-27 PROCEDURE — 2580000003 HC RX 258: Performed by: STUDENT IN AN ORGANIZED HEALTH CARE EDUCATION/TRAINING PROGRAM

## 2024-02-27 PROCEDURE — 0QBM0ZX EXCISION OF LEFT TARSAL, OPEN APPROACH, DIAGNOSTIC: ICD-10-PCS | Performed by: SURGERY

## 2024-02-27 PROCEDURE — 84300 ASSAY OF URINE SODIUM: CPT

## 2024-02-27 PROCEDURE — 82803 BLOOD GASES ANY COMBINATION: CPT

## 2024-02-27 PROCEDURE — 0BH17EZ INSERTION OF ENDOTRACHEAL AIRWAY INTO TRACHEA, VIA NATURAL OR ARTIFICIAL OPENING: ICD-10-PCS | Performed by: STUDENT IN AN ORGANIZED HEALTH CARE EDUCATION/TRAINING PROGRAM

## 2024-02-27 PROCEDURE — 84484 ASSAY OF TROPONIN QUANT: CPT

## 2024-02-27 PROCEDURE — 86900 BLOOD TYPING SEROLOGIC ABO: CPT

## 2024-02-27 PROCEDURE — 86923 COMPATIBILITY TEST ELECTRIC: CPT

## 2024-02-27 PROCEDURE — 99291 CRITICAL CARE FIRST HOUR: CPT | Performed by: INTERNAL MEDICINE

## 2024-02-27 PROCEDURE — 2709999900 HC NON-CHARGEABLE SUPPLY: Performed by: SURGERY

## 2024-02-27 PROCEDURE — 3600000003 HC SURGERY LEVEL 3 BASE: Performed by: SURGERY

## 2024-02-27 PROCEDURE — 11042 DBRDMT SUBQ TIS 1ST 20SQCM/<: CPT | Performed by: SURGERY

## 2024-02-27 PROCEDURE — 88307 TISSUE EXAM BY PATHOLOGIST: CPT

## 2024-02-27 PROCEDURE — 83605 ASSAY OF LACTIC ACID: CPT

## 2024-02-27 PROCEDURE — 99223 1ST HOSP IP/OBS HIGH 75: CPT | Performed by: INTERNAL MEDICINE

## 2024-02-27 PROCEDURE — 73590 X-RAY EXAM OF LOWER LEG: CPT

## 2024-02-27 PROCEDURE — 0Y6H0Z3 DETACHMENT AT RIGHT LOWER LEG, LOW, OPEN APPROACH: ICD-10-PCS | Performed by: SURGERY

## 2024-02-27 PROCEDURE — 0Y6H0Z1 DETACHMENT AT RIGHT LOWER LEG, HIGH, OPEN APPROACH: ICD-10-PCS | Performed by: SURGERY

## 2024-02-27 PROCEDURE — 85014 HEMATOCRIT: CPT

## 2024-02-27 PROCEDURE — 87176 TISSUE HOMOGENIZATION CULTR: CPT

## 2024-02-27 PROCEDURE — 93005 ELECTROCARDIOGRAM TRACING: CPT | Performed by: EMERGENCY MEDICINE

## 2024-02-27 PROCEDURE — 83735 ASSAY OF MAGNESIUM: CPT

## 2024-02-27 PROCEDURE — 85018 HEMOGLOBIN: CPT

## 2024-02-27 PROCEDURE — 82728 ASSAY OF FERRITIN: CPT

## 2024-02-27 PROCEDURE — 0QBM0ZZ EXCISION OF LEFT TARSAL, OPEN APPROACH: ICD-10-PCS | Performed by: SURGERY

## 2024-02-27 PROCEDURE — 96366 THER/PROPH/DIAG IV INF ADDON: CPT

## 2024-02-27 PROCEDURE — 2000000000 HC ICU R&B

## 2024-02-27 PROCEDURE — 82330 ASSAY OF CALCIUM: CPT

## 2024-02-27 PROCEDURE — 83880 ASSAY OF NATRIURETIC PEPTIDE: CPT

## 2024-02-27 PROCEDURE — 87070 CULTURE OTHR SPECIMN AEROBIC: CPT

## 2024-02-27 PROCEDURE — A4216 STERILE WATER/SALINE, 10 ML: HCPCS

## 2024-02-27 PROCEDURE — 3700000000 HC ANESTHESIA ATTENDED CARE: Performed by: SURGERY

## 2024-02-27 PROCEDURE — 87075 CULTR BACTERIA EXCEPT BLOOD: CPT

## 2024-02-27 PROCEDURE — 99222 1ST HOSP IP/OBS MODERATE 55: CPT | Performed by: INTERNAL MEDICINE

## 2024-02-27 PROCEDURE — 96367 TX/PROPH/DG ADDL SEQ IV INF: CPT

## 2024-02-27 PROCEDURE — 2500000003 HC RX 250 WO HCPCS

## 2024-02-27 PROCEDURE — 84100 ASSAY OF PHOSPHORUS: CPT

## 2024-02-27 PROCEDURE — 85025 COMPLETE CBC W/AUTO DIFF WBC: CPT

## 2024-02-27 PROCEDURE — 94002 VENT MGMT INPAT INIT DAY: CPT

## 2024-02-27 PROCEDURE — 80307 DRUG TEST PRSMV CHEM ANLYZR: CPT

## 2024-02-27 PROCEDURE — 83540 ASSAY OF IRON: CPT

## 2024-02-27 PROCEDURE — 93010 ELECTROCARDIOGRAM REPORT: CPT | Performed by: INTERNAL MEDICINE

## 2024-02-27 PROCEDURE — 36430 TRANSFUSION BLD/BLD COMPNT: CPT

## 2024-02-27 PROCEDURE — 84540 ASSAY OF URINE/UREA-N: CPT

## 2024-02-27 PROCEDURE — 87102 FUNGUS ISOLATION CULTURE: CPT

## 2024-02-27 RX ORDER — SODIUM CHLORIDE 9 MG/ML
INJECTION, SOLUTION INTRAVENOUS PRN
Status: DISCONTINUED | OUTPATIENT
Start: 2024-02-27 | End: 2024-02-28

## 2024-02-27 RX ORDER — OXYCODONE HYDROCHLORIDE 10 MG/1
10 TABLET ORAL EVERY 4 HOURS PRN
Status: DISCONTINUED | OUTPATIENT
Start: 2024-02-27 | End: 2024-03-04 | Stop reason: HOSPADM

## 2024-02-27 RX ORDER — FENTANYL CITRATE 50 UG/ML
INJECTION, SOLUTION INTRAMUSCULAR; INTRAVENOUS PRN
Status: DISCONTINUED | OUTPATIENT
Start: 2024-02-27 | End: 2024-02-27 | Stop reason: SDUPTHER

## 2024-02-27 RX ORDER — 3% SODIUM CHLORIDE 3 G/100ML
25 INJECTION, SOLUTION INTRAVENOUS CONTINUOUS
Status: DISPENSED | OUTPATIENT
Start: 2024-02-27 | End: 2024-02-27

## 2024-02-27 RX ORDER — SODIUM CHLORIDE 9 MG/ML
INJECTION, SOLUTION INTRAVENOUS CONTINUOUS PRN
Status: DISCONTINUED | OUTPATIENT
Start: 2024-02-27 | End: 2024-02-27 | Stop reason: SDUPTHER

## 2024-02-27 RX ORDER — SODIUM CHLORIDE 0.9 % (FLUSH) 0.9 %
5-40 SYRINGE (ML) INJECTION EVERY 12 HOURS SCHEDULED
Status: DISCONTINUED | OUTPATIENT
Start: 2024-02-27 | End: 2024-03-04

## 2024-02-27 RX ORDER — MIDAZOLAM HYDROCHLORIDE 1 MG/ML
INJECTION INTRAMUSCULAR; INTRAVENOUS PRN
Status: DISCONTINUED | OUTPATIENT
Start: 2024-02-27 | End: 2024-02-27 | Stop reason: SDUPTHER

## 2024-02-27 RX ORDER — SODIUM CHLORIDE 9 MG/ML
INJECTION, SOLUTION INTRAVENOUS PRN
Status: DISCONTINUED | OUTPATIENT
Start: 2024-02-27 | End: 2024-02-27

## 2024-02-27 RX ORDER — HYDROMORPHONE HYDROCHLORIDE 2 MG/ML
INJECTION, SOLUTION INTRAMUSCULAR; INTRAVENOUS; SUBCUTANEOUS PRN
Status: DISCONTINUED | OUTPATIENT
Start: 2024-02-27 | End: 2024-02-27 | Stop reason: SDUPTHER

## 2024-02-27 RX ORDER — ACETAMINOPHEN 650 MG/1
650 SUPPOSITORY RECTAL EVERY 6 HOURS PRN
Status: DISCONTINUED | OUTPATIENT
Start: 2024-02-27 | End: 2024-03-04 | Stop reason: HOSPADM

## 2024-02-27 RX ORDER — POTASSIUM CHLORIDE 7.45 MG/ML
10 INJECTION INTRAVENOUS PRN
Status: DISCONTINUED | OUTPATIENT
Start: 2024-02-27 | End: 2024-02-27

## 2024-02-27 RX ORDER — PROPOFOL 10 MG/ML
INJECTION, EMULSION INTRAVENOUS PRN
Status: DISCONTINUED | OUTPATIENT
Start: 2024-02-27 | End: 2024-02-27 | Stop reason: SDUPTHER

## 2024-02-27 RX ORDER — DEXAMETHASONE SODIUM PHOSPHATE 10 MG/ML
INJECTION INTRAMUSCULAR; INTRAVENOUS PRN
Status: DISCONTINUED | OUTPATIENT
Start: 2024-02-27 | End: 2024-02-27 | Stop reason: SDUPTHER

## 2024-02-27 RX ORDER — SODIUM CHLORIDE 0.9 % (FLUSH) 0.9 %
5-40 SYRINGE (ML) INJECTION PRN
Status: DISCONTINUED | OUTPATIENT
Start: 2024-02-27 | End: 2024-02-28

## 2024-02-27 RX ORDER — SENNOSIDES A AND B 8.6 MG/1
1 TABLET, FILM COATED ORAL DAILY PRN
Status: DISCONTINUED | OUTPATIENT
Start: 2024-02-27 | End: 2024-03-04 | Stop reason: HOSPADM

## 2024-02-27 RX ORDER — SODIUM CHLORIDE 0.9 % (FLUSH) 0.9 %
5-40 SYRINGE (ML) INJECTION EVERY 12 HOURS SCHEDULED
Status: DISCONTINUED | OUTPATIENT
Start: 2024-02-27 | End: 2024-02-28

## 2024-02-27 RX ORDER — MAGNESIUM SULFATE IN WATER 40 MG/ML
2000 INJECTION, SOLUTION INTRAVENOUS PRN
Status: DISCONTINUED | OUTPATIENT
Start: 2024-02-27 | End: 2024-02-27

## 2024-02-27 RX ORDER — CHLORHEXIDINE GLUCONATE ORAL RINSE 1.2 MG/ML
15 SOLUTION DENTAL 2 TIMES DAILY
Status: DISCONTINUED | OUTPATIENT
Start: 2024-02-27 | End: 2024-03-04

## 2024-02-27 RX ORDER — FENTANYL CITRATE-0.9 % NACL/PF 10 MCG/ML
25-200 PLASTIC BAG, INJECTION (ML) INTRAVENOUS CONTINUOUS
Status: DISCONTINUED | OUTPATIENT
Start: 2024-02-27 | End: 2024-03-03

## 2024-02-27 RX ORDER — ONDANSETRON 4 MG/1
4 TABLET, ORALLY DISINTEGRATING ORAL EVERY 8 HOURS PRN
Status: DISCONTINUED | OUTPATIENT
Start: 2024-02-27 | End: 2024-03-04 | Stop reason: HOSPADM

## 2024-02-27 RX ORDER — ACETAMINOPHEN 325 MG/1
650 TABLET ORAL EVERY 6 HOURS PRN
Status: DISCONTINUED | OUTPATIENT
Start: 2024-02-27 | End: 2024-03-04 | Stop reason: HOSPADM

## 2024-02-27 RX ORDER — LEVOTHYROXINE SODIUM 0.03 MG/1
50 TABLET ORAL DAILY
Status: DISCONTINUED | OUTPATIENT
Start: 2024-02-27 | End: 2024-03-04 | Stop reason: HOSPADM

## 2024-02-27 RX ORDER — SODIUM CHLORIDE 9 MG/ML
INJECTION, SOLUTION INTRAVENOUS CONTINUOUS
Status: DISCONTINUED | OUTPATIENT
Start: 2024-02-27 | End: 2024-02-27

## 2024-02-27 RX ORDER — POTASSIUM CHLORIDE 20 MEQ/1
40 TABLET, EXTENDED RELEASE ORAL PRN
Status: DISCONTINUED | OUTPATIENT
Start: 2024-02-27 | End: 2024-02-27

## 2024-02-27 RX ORDER — ONDANSETRON 2 MG/ML
4 INJECTION INTRAMUSCULAR; INTRAVENOUS EVERY 6 HOURS PRN
Status: DISCONTINUED | OUTPATIENT
Start: 2024-02-27 | End: 2024-03-04 | Stop reason: HOSPADM

## 2024-02-27 RX ORDER — CLINDAMYCIN PHOSPHATE 900 MG/50ML
900 INJECTION, SOLUTION INTRAVENOUS EVERY 8 HOURS
Status: DISCONTINUED | OUTPATIENT
Start: 2024-02-27 | End: 2024-02-28

## 2024-02-27 RX ORDER — METHYL SALICYLATE
OIL (ML) MISCELLANEOUS PRN
Status: DISCONTINUED | OUTPATIENT
Start: 2024-02-27 | End: 2024-03-04 | Stop reason: HOSPADM

## 2024-02-27 RX ORDER — MAGNESIUM HYDROXIDE/ALUMINUM HYDROXICE/SIMETHICONE 120; 1200; 1200 MG/30ML; MG/30ML; MG/30ML
30 SUSPENSION ORAL EVERY 6 HOURS PRN
Status: DISCONTINUED | OUTPATIENT
Start: 2024-02-27 | End: 2024-03-04 | Stop reason: HOSPADM

## 2024-02-27 RX ORDER — HYDRALAZINE HYDROCHLORIDE 20 MG/ML
10 INJECTION INTRAMUSCULAR; INTRAVENOUS EVERY 6 HOURS PRN
Status: DISCONTINUED | OUTPATIENT
Start: 2024-02-27 | End: 2024-03-04 | Stop reason: HOSPADM

## 2024-02-27 RX ORDER — ROCURONIUM BROMIDE 10 MG/ML
INJECTION, SOLUTION INTRAVENOUS PRN
Status: DISCONTINUED | OUTPATIENT
Start: 2024-02-27 | End: 2024-02-27 | Stop reason: SDUPTHER

## 2024-02-27 RX ORDER — MIDAZOLAM HYDROCHLORIDE 1 MG/ML
1-10 INJECTION, SOLUTION INTRAVENOUS CONTINUOUS
Status: DISCONTINUED | OUTPATIENT
Start: 2024-02-27 | End: 2024-03-03

## 2024-02-27 RX ORDER — ETOMIDATE 2 MG/ML
INJECTION INTRAVENOUS PRN
Status: DISCONTINUED | OUTPATIENT
Start: 2024-02-27 | End: 2024-02-27 | Stop reason: SDUPTHER

## 2024-02-27 RX ORDER — SODIUM CHLORIDE 0.9 % (FLUSH) 0.9 %
5-40 SYRINGE (ML) INJECTION PRN
Status: DISCONTINUED | OUTPATIENT
Start: 2024-02-27 | End: 2024-03-04 | Stop reason: HOSPADM

## 2024-02-27 RX ORDER — OXYCODONE HYDROCHLORIDE 5 MG/1
5 TABLET ORAL EVERY 4 HOURS PRN
Status: DISCONTINUED | OUTPATIENT
Start: 2024-02-27 | End: 2024-03-04 | Stop reason: HOSPADM

## 2024-02-27 RX ORDER — CALCIUM GLUCONATE 10 MG/ML
1000 INJECTION, SOLUTION INTRAVENOUS ONCE
Status: COMPLETED | OUTPATIENT
Start: 2024-02-27 | End: 2024-02-27

## 2024-02-27 RX ORDER — METHOCARBAMOL 500 MG/1
500 TABLET, FILM COATED ORAL EVERY 8 HOURS PRN
Status: DISCONTINUED | OUTPATIENT
Start: 2024-02-27 | End: 2024-03-04 | Stop reason: HOSPADM

## 2024-02-27 RX ADMIN — Medication 2 MG/HR: at 17:05

## 2024-02-27 RX ADMIN — MEROPENEM 1000 MG: 1 INJECTION, POWDER, FOR SOLUTION INTRAVENOUS at 23:50

## 2024-02-27 RX ADMIN — HYDROMORPHONE HYDROCHLORIDE 2 MG: 2 INJECTION, SOLUTION INTRAMUSCULAR; INTRAVENOUS; SUBCUTANEOUS at 14:38

## 2024-02-27 RX ADMIN — VANCOMYCIN HYDROCHLORIDE 2000 MG: 10 INJECTION, POWDER, LYOPHILIZED, FOR SOLUTION INTRAVENOUS at 01:58

## 2024-02-27 RX ADMIN — DEXAMETHASONE SODIUM PHOSPHATE 10 MG: 10 INJECTION INTRAMUSCULAR; INTRAVENOUS at 14:00

## 2024-02-27 RX ADMIN — FENTANYL CITRATE 100 MCG: 0.05 INJECTION, SOLUTION INTRAMUSCULAR; INTRAVENOUS at 14:02

## 2024-02-27 RX ADMIN — PROPOFOL 50 MG: 10 INJECTION, EMULSION INTRAVENOUS at 13:29

## 2024-02-27 RX ADMIN — CLINDAMYCIN IN 5 PERCENT DEXTROSE 900 MG: 18 INJECTION, SOLUTION INTRAVENOUS at 15:17

## 2024-02-27 RX ADMIN — ROCURONIUM BROMIDE 100 MG: 10 INJECTION, SOLUTION INTRAVENOUS at 13:22

## 2024-02-27 RX ADMIN — SODIUM CHLORIDE 25 ML/HR: 3 INJECTION, SOLUTION INTRAVENOUS at 12:20

## 2024-02-27 RX ADMIN — Medication 50 MCG/HR: at 15:28

## 2024-02-27 RX ADMIN — FENTANYL CITRATE 100 MCG: 0.05 INJECTION, SOLUTION INTRAMUSCULAR; INTRAVENOUS at 13:43

## 2024-02-27 RX ADMIN — CHLORHEXIDINE GLUCONATE 15 ML: 1.2 RINSE ORAL at 20:34

## 2024-02-27 RX ADMIN — Medication 200 MCG/HR: at 20:23

## 2024-02-27 RX ADMIN — SODIUM CHLORIDE, PRESERVATIVE FREE 20 MG: 5 INJECTION INTRAVENOUS at 20:34

## 2024-02-27 RX ADMIN — MICONAZOLE NITRATE: 20.6 POWDER TOPICAL at 15:29

## 2024-02-27 RX ADMIN — CLINDAMYCIN IN 5 PERCENT DEXTROSE 900 MG: 18 INJECTION, SOLUTION INTRAVENOUS at 09:30

## 2024-02-27 RX ADMIN — MIDAZOLAM 1 MG: 1 INJECTION INTRAMUSCULAR; INTRAVENOUS at 13:43

## 2024-02-27 RX ADMIN — MEROPENEM 1000 MG: 1 INJECTION, POWDER, FOR SOLUTION INTRAVENOUS at 00:26

## 2024-02-27 RX ADMIN — CLINDAMYCIN IN 5 PERCENT DEXTROSE 900 MG: 18 INJECTION, SOLUTION INTRAVENOUS at 23:47

## 2024-02-27 RX ADMIN — CALCIUM GLUCONATE 1000 MG: 10 INJECTION, SOLUTION INTRAVENOUS at 22:20

## 2024-02-27 RX ADMIN — FENTANYL CITRATE 50 MCG: 0.05 INJECTION, SOLUTION INTRAMUSCULAR; INTRAVENOUS at 13:29

## 2024-02-27 RX ADMIN — MEROPENEM 1000 MG: 1 INJECTION, POWDER, FOR SOLUTION INTRAVENOUS at 11:50

## 2024-02-27 RX ADMIN — ETOMIDATE 20 MG: 2 INJECTION, SOLUTION INTRAVENOUS at 13:22

## 2024-02-27 RX ADMIN — VANCOMYCIN HYDROCHLORIDE 1500 MG: 10 INJECTION, POWDER, LYOPHILIZED, FOR SOLUTION INTRAVENOUS at 11:51

## 2024-02-27 RX ADMIN — SODIUM CHLORIDE: 9 INJECTION, SOLUTION INTRAVENOUS at 12:55

## 2024-02-27 RX ADMIN — MIDAZOLAM 1 MG: 1 INJECTION INTRAMUSCULAR; INTRAVENOUS at 13:12

## 2024-02-27 RX ADMIN — MEROPENEM 1000 MG: 1 INJECTION, POWDER, FOR SOLUTION INTRAVENOUS at 15:20

## 2024-02-27 ASSESSMENT — PAIN DESCRIPTION - DESCRIPTORS
DESCRIPTORS: DISCOMFORT;ACHING;THROBBING
DESCRIPTORS: ACHING;THROBBING

## 2024-02-27 ASSESSMENT — PAIN DESCRIPTION - LOCATION
LOCATION: ANKLE
LOCATION: ANKLE

## 2024-02-27 ASSESSMENT — PAIN - FUNCTIONAL ASSESSMENT: PAIN_FUNCTIONAL_ASSESSMENT: 0-10

## 2024-02-27 ASSESSMENT — PULMONARY FUNCTION TESTS
PIF_VALUE: 21
PIF_VALUE: 19

## 2024-02-27 ASSESSMENT — PAIN SCALES - GENERAL
PAINLEVEL_OUTOF10: 5
PAINLEVEL_OUTOF10: 5
PAINLEVEL_OUTOF10: 0

## 2024-02-27 ASSESSMENT — PAIN DESCRIPTION - ORIENTATION
ORIENTATION: LEFT;RIGHT
ORIENTATION: LEFT;RIGHT
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
## 2024-02-27 NOTE — PLAN OF CARE
Patient in bilateral wrist restraints, when released reaching for lines and tubes.       Problem: Pain  Goal: Verbalizes/displays adequate comfort level or baseline comfort level  Outcome: Progressing     Problem: Skin/Tissue Integrity  Goal: Absence of new skin breakdown  Description: 1.  Monitor for areas of redness and/or skin breakdown  2.  Assess vascular access sites hourly  3.  Every 4-6 hours minimum:  Change oxygen saturation probe site  4.  Every 4-6 hours:  If on nasal continuous positive airway pressure, respiratory therapy assess nares and determine need for appliance change or resting period.  Outcome: Progressing     Problem: ABCDS Injury Assessment  Goal: Absence of physical injury  Outcome: Progressing     Problem: Safety - Medical Restraint  Goal: Remains free of injury from restraints (Restraint for Interference with Medical Device)  Description: INTERVENTIONS:  1. Determine that other, less restrictive measures have been tried or would not be effective before applying the restraint  2. Evaluate the patient's condition at the time of restraint application  3. Inform patient/family regarding the reason for restraint  4. Q2H: Monitor safety, psychosocial status, comfort, nutrition and hydration  Outcome: Progressing     Problem: Discharge Planning  Goal: Discharge to home or other facility with appropriate resources  Outcome: Not Progressing
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
## 2024-02-27 NOTE — CONSULTS
sensation is grossly intact MILE.    DERM:  Skin is well hydrated to the bilateral lower extremities. There are wounds to the proximal 1/3 of the tibia. The right foot has extensive wounds with black eschars covering 75% of the foot. Malodor, fluctuance, crepitance, edema, and erythema are all present.  Left foot has eschar-covered wound to the heel. No fluctuance, crepitance, or drainage present. Edema, and erythema are present. Toenails 1-5 b/l are abnormal in thickness, color and length.                             MUSCULOSKELETAL: No deformities noted.  5/5 Gross Muscle strength in all 4 quadrants.      CONSULTS:  IP CONSULT TO NEPHROLOGY  IP CONSULT TO GENERAL SURGERY  IP CONSULT TO PODIATRY  IP CONSULT TO INTERNAL MEDICINE    MEDICATION:  Scheduled Meds:   tranexamic acid        vancomycin  20 mg/kg IntraVENous Once     Continuous Infusions:    PRN Meds:.tranexamic acid    RADIOLOGY:  XR TIBIA FIBULA RIGHT (2 VIEWS)    (Results Pending)   XR TIBIA FIBULA LEFT (2 VIEWS)    (Results Pending)   XR FOOT RIGHT (MIN 3 VIEWS)    (Results Pending)   XR FOOT LEFT (MIN 3 VIEWS)    (Results Pending)       Vitals:    BP (!) 140/76   Pulse 95   Temp 98.8 °F (37.1 °C) (Tympanic)   Resp 18   Ht 1.803 m (5' 11\")   Wt 104.3 kg (230 lb)   SpO2 98%   BMI 32.08 kg/m²     LABS:   Recent Labs     02/26/24  2315   WBC 18.5*   HGB 8.8*   HCT 27.5*   *     Recent Labs     02/26/24  2315   *   K 4.6   CL 79*   CO2 22   BUN 14   CREATININE 0.7     Recent Labs     02/26/24  2315   PROT 7.1   INR 1.5       ASSESSMENTS:   - necrotizing fasciitis - right foot  - open wounds - left foot    PLAN:  - Patient was examined and evaluated.Reviewed patient's recent lab results, charts and pertinent diagnostic imaging.Reviewed ancillary service notes.  - Antibiotics as per ID: meropenem, vancomycin  - Vascular: ordered and pending; vascular consult appreciated  - X-rays: pending  - Dressing: none  - Planning surgical intervention  later this morning: Incision and drainage of bilateral feet with possible bone biopsy   - Patient to be NPO until surgery  - Will continue to follow patient while they are in-house.  - Discussed patient with Dr. Mi and Mandy    Thank you for the opportunity to take part in the patient's care. Please do not hesitate to call for any questions or concerns.
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
## 2024-02-27 NOTE — ANESTHESIA POSTPROCEDURE EVALUATION
Department of Anesthesiology  Postprocedure Note    Patient: Navin Tran  MRN: [id number]  YOB: 1955  Date of evaluation: 2/27/2024    Procedure Summary       Date: 02/27/24 Room / Location: [address]    Anesthesia Start: 1318 Anesthesia Stop: 1439    Procedures:       RIGHT LOWER EXTREMITY GUILLOTIN AMPUTATION (Right: Leg Lower)      LEFT LOWER LEG DEBRIDEMENT DOWN TO BONE INCISION AND DRAINAGE (Left: Leg Lower) Diagnosis:       Necrotizing fasciitis (HCC)      (Necrotizing fasciitis (HCC) [M72.6])    Surgeons: Katerina Wild MD Responsible Provider: Sue Irving MD    Anesthesia Type: general ASA Status: 4 - Emergent            Anesthesia Type: No value filed.    Malika Phase I:      Malika Phase II:      Anesthesia Post Evaluation    Patient location during evaluation: ICU  Level of consciousness: sedated and ventilated  Airway patency: patent  Nausea & Vomiting: no nausea and no vomiting  Cardiovascular status: hemodynamically stable  Respiratory status: ventilator  Hydration status: hypovolemic  Multimodal analgesia pain management approach    No notable events documented.
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
## 2024-02-27 NOTE — CONSULTS
Radiographs from same day HISTORY ORDERING SYSTEM PROVIDED HISTORY: wounds and odor TECHNOLOGIST PROVIDED HISTORY: Reason for exam:->wounds and odor Decision Support Exception - unselect if not a suspected or confirmed emergency medical condition->Emergency Medical Condition (MA) What reading provider will be dictating this exam?->CRC FINDINGS: Left ankle: Bones: No evidence of acute fracture or dislocation. No aggressive appearing osseous abnormality or periostitis.  Distal tibia, fibula, talus appear intact. Soft Tissue: Moderate soft tissue swelling medially and laterally with soft tissue ulceration anterior laterally.  No underlying bone destruction. Joint: Ankle mortise appears well maintained.  Subtalar joints appear intact. Talonavicular joint is intact. Left foot: Bones: There is bone destruction involving the inferior posterior cortex of the calcaneus compatible with osteomyelitis.  Incidental note is made of an accessory navicular bone.  The tarsal bones appear intact with normal alignment.  Metatarsals demonstrate normal alignment without bone destruction.  Phalanges appear intact. Soft Tissue: There is moderate dorsal subcutaneous edema.  No focal fluid collection.  No evidence of soft tissue gas in the forefoot.  There is a region of ulceration and abnormal soft tissue gas involving the lateral aspect of the heel pad with underlying bone destruction of the posterolateral calcaneus compatible with osteomyelitis.  No focal abscess.  Small radiopaque foreign bodies are noted within the ulceration site.     1. Soft tissue ulceration and soft tissue gas involving the lateral aspect of the heel pad with underlying bone destruction of the posterolateral calcaneus compatible with osteomyelitis. 2. Soft tissue swelling medially and laterally at the ankle with soft tissue ulceration anterolaterally. No underlying bone destruction of the tibia or fibula. 3. No evidence of acute fracture or dislocation of the ankle  identified along the right ankle mortise both medially and laterally. No cortical regularity to suggest osteomyelitis. 4. Left tibia and fibula is unremarkable with edema seen diffusely throughout the left lower extremity.     XR FOOT LEFT (MIN 3 VIEWS)    Result Date: 2/27/2024  EXAMINATION: 2 XRAY VIEWS OF THE LEFT TIBIA AND FIBULA; THREE XRAY VIEWS OF THE RIGHT FOOT; THREE XRAY VIEWS OF THE LEFT FOOT; 2 XRAY VIEWS OF THE RIGHT TIBIA AND FIBULA 2/27/2024 1:11 am COMPARISON: None. HISTORY: ORDERING SYSTEM PROVIDED HISTORY: wounds TECHNOLOGIST PROVIDED HISTORY: Reason for exam:->wounds What reading provider will be dictating this exam?->CRC; ORDERING SYSTEM PROVIDED HISTORY: wound TECHNOLOGIST PROVIDED HISTORY: Reason for exam:->wound What reading provider will be dictating this exam?->CRC FINDINGS: Three views of the right foot reveal extensive air and gas seen throughout the soft tissues of the right foot to suggest necrotizing fasciitis.  There is skin irregularity identified along the lateral aspect of the foot.  No cortical destruction to suggest osteomyelitis.  Degenerative changes seen within the midfoot with calcaneal spurring. Three views of the left foot reveal degenerative changes seen of the 1st metatarsophalangeal joint.  There is ulceration identified along the left heel with radiopaque foreign bodies identified in the soft tissues.  There is some cortical destruction seen of the calcaneus to suggest osteomyelitis.  No air in the soft tissues. Two views of the right tibia and fibula reveal gas identified along the ankle mortise both medially and laterally.  There is skin irregularity identified along the anterior aspect of the tibia.  No cortical regularity.  There is degenerative changes seen within the knee.  Ankle mortise is intact. Two views of left tibia and fibula reveal edema throughout the soft tissues. No acute bony abnormality.  The cortex is intact.  Joint spaces are preserved.     1.

## 2024-02-27 NOTE — PROGRESS NOTES
Pt is now agreeable to proceed    R below knee guillotine amputation  L LE debridement    He understands he is high risk for complication  He understands he is will need future procedures to revise R LE to formal amputation    I reviewed the procedure with the patient and family as available.  I discussed the procedure, risks, benefits, complications, and alternatives of the procedure.  They understand and consent.  All questions were answered    Katerina Wild MD

## 2024-02-27 NOTE — ANESTHESIA PRE PROCEDURE
1300 02/27/24 1439   BP: 126/69 126/69 115/70 (!) 157/57   Pulse: 82 84 83 81   Resp: 16 18 17 12   Temp: 98.5 °F (36.9 °C) 98.5 °F (36.9 °C)  99.3 °F (37.4 °C)   TempSrc:  Temporal  Bladder   SpO2: 92% 90% 92% 100%   Weight:       Height:                                                  BP Readings from Last 3 Encounters:   02/27/24 (!) 157/57   10/12/23 (!) 190/100   09/20/23 130/76       NPO Status:                                                                                 BMI:   Wt Readings from Last 3 Encounters:   02/26/24 104.3 kg (230 lb)   10/12/23 104.3 kg (230 lb)   09/20/23 120.7 kg (266 lb)     Body mass index is 32.08 kg/m².    CBC:   Lab Results   Component Value Date/Time    WBC 17.9 02/27/2024 09:33 AM    RBC 2.96 02/27/2024 09:33 AM    HGB 6.9 02/27/2024 09:33 AM    HCT 21.4 02/27/2024 09:33 AM    MCV 72.3 02/27/2024 09:33 AM    RDW 17.9 02/27/2024 09:33 AM     02/27/2024 09:33 AM       CMP:   Lab Results   Component Value Date/Time     02/27/2024 09:33 AM    K 4.7 02/27/2024 09:33 AM    CL 83 02/27/2024 09:33 AM    CO2 25 02/27/2024 09:33 AM    BUN 13 02/27/2024 09:33 AM    CREATININE 0.5 02/27/2024 01:41 PM    CREATININE 0.7 02/27/2024 09:33 AM    LABGLOM >60 02/27/2024 09:33 AM    GLUCOSE 83 02/27/2024 09:33 AM    PROT 7.1 02/26/2024 11:15 PM    CALCIUM 8.4 02/27/2024 09:33 AM    BILITOT 0.2 02/26/2024 11:15 PM    ALKPHOS 94 02/26/2024 11:15 PM    AST 20 02/26/2024 11:15 PM    ALT 16 02/26/2024 11:15 PM       POC Tests:   Recent Labs     02/27/24  1341 02/27/24  1446   POCGLU 90 110*   POCNA 120*  --    POCK 4.0  --    POCCL 87*  --    POCBUN 12  --    POCHCT 23*  --        Coags:   Lab Results   Component Value Date/Time    PROTIME 15.9 02/26/2024 11:15 PM    INR 1.5 02/26/2024 11:15 PM       HCG (If Applicable): No results found for: \"PREGTESTUR\", \"PREGSERUM\", \"HCG\", \"HCGQUANT\"     ABGs: No results found for: \"PHART\", \"PO2ART\", \"HLC7VLB\", \"QZB3BRF\", \"BEART\", \"P3FKSYQP\"

## 2024-02-27 NOTE — CONSULTS
midfoot with calcaneal spurring. Three views of the left foot reveal degenerative changes seen of the 1st metatarsophalangeal joint.  There is ulceration identified along the left heel with radiopaque foreign bodies identified in the soft tissues.  There is some cortical destruction seen of the calcaneus to suggest osteomyelitis.  No air in the soft tissues. Two views of the right tibia and fibula reveal gas identified along the ankle mortise both medially and laterally.  There is skin irregularity identified along the anterior aspect of the tibia.  No cortical regularity.  There is degenerative changes seen within the knee.  Ankle mortise is intact. Two views of left tibia and fibula reveal edema throughout the soft tissues. No acute bony abnormality.  The cortex is intact.  Joint spaces are preserved.     1. Extensive air and gas seen throughout the soft tissues of the right foot to suggest necrotizing fasciitis. 2. Ulceration identified along the left heel with radiopaque foreign bodies identified in the soft tissues. There is some cortical destruction seen of the left calcaneus to suggest osteomyelitis. 3. Gas identified along the right ankle mortise both medially and laterally. No cortical regularity to suggest osteomyelitis. 4. Left tibia and fibula is unremarkable with edema seen diffusely throughout the left lower extremity.     XR TIBIA FIBULA LEFT (2 VIEWS)    Result Date: 2/27/2024  EXAMINATION: 2 XRAY VIEWS OF THE LEFT TIBIA AND FIBULA; THREE XRAY VIEWS OF THE RIGHT FOOT; THREE XRAY VIEWS OF THE LEFT FOOT; 2 XRAY VIEWS OF THE RIGHT TIBIA AND FIBULA 2/27/2024 1:11 am COMPARISON: None. HISTORY: ORDERING SYSTEM PROVIDED HISTORY: wounds TECHNOLOGIST PROVIDED HISTORY: Reason for exam:->wounds What reading provider will be dictating this exam?->CRC; ORDERING SYSTEM PROVIDED HISTORY: wound TECHNOLOGIST PROVIDED HISTORY: Reason for exam:->wound What reading provider will be dictating this exam?->CRC FINDINGS:  tissues. There is some cortical destruction seen of the left calcaneus to suggest osteomyelitis. 3. Gas identified along the right ankle mortise both medially and laterally. No cortical regularity to suggest osteomyelitis. 4. Left tibia and fibula is unremarkable with edema seen diffusely throughout the left lower extremity.         ASSESSMENT/PLAN:  Necrotizing fasciitis of the RLE causing sepsis, wound of left lower extremity     Plan  -stat OR for guillotine amputation of RLE  -debridement of LLE in OR   -abx per ID team     Robert Goyal MD  Surgery Resident PGY-3  2/27/2024  1:05 PM    Pt seen and examined  R LE air, gas in tissue  Extensive Tissue loss  L LE wounds of calf and heel    R LE is non salvageable  L LE is not as profound but also may not be salvageable    R LE is primary issue causing sepsis, infection most likely    Recommend  R below the knee guillotine amputation   L LE wound debridement    Initially patient was resistant to proceeding  Eventually he was agreeable and patient was stat to OR    I reviewed the procedure with the patient and family as available.  I discussed the procedure, risks, benefits, complications, and alternatives of the procedure.  They understand and consent.  All questions were answered    They understand he is high risk for L LE limb loss also  They understand considering his overall condition he is at risk for significant complications even death  They also understands he will eventually need revision of R LA to BKA vs AKA in the future 0 formal amputation    Total critical care time caring for this patient with life threatening, unstable organ failure, including direct patient contact, management of life support systems, review of data including imaging and labs, discussions with other team members and physicians at least 40 minutes so far today, excluding procedures.       Katerina Wild MD
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
## 2024-02-27 NOTE — ED NOTES
After speaking with the Dr, States questions answered and denies further questions.  Consent for surgery signed by patient, witnessed by myself and patients wife.  Awaiting OR team at this time.
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
## 2024-02-27 NOTE — PROGRESS NOTES
Pharmacy Consultation Note  (Antibiotic Dosing and Monitoring)    Initial consult date: 2/27/24  Consulting physician/provider: Dr. Chi  Drug: Vancomycin  Indication: Nec fasc of bilateral lower extremities    Age/  Gender Height Weight IBW  Allergy Information   68 y.o./male 180.3 cm (5' 11\") 104.3 kg (230 lb)     Ideal body weight: 75.3 kg (166 lb 0.1 oz)  Adjusted ideal body weight: 86.9 kg (191 lb 9.7 oz)   Metoprolol and Pcn [penicillins]      Renal Function:  Recent Labs     02/26/24  2315   BUN 14   CREATININE 0.7     No intake or output data in the 24 hours ending 02/27/24 1022    Vancomycin Monitoring:  Trough:  No results for input(s): \"VANCOTROUGH\" in the last 72 hours.  Random:  No results for input(s): \"VANCORANDOM\" in the last 72 hours.    Vancomycin Administration Times:  Recent vancomycin administrations                     vancomycin (VANCOCIN) 2,000 mg in sodium chloride 0.9 % 500 mL IVPB (mg) 2,000 mg New Bag 02/27/24 0158                    Assessment:  Patient is a 68 y.o. male who has been initiated on vancomycin  Estimated Creatinine Clearance: 128 mL/min (based on SCr of 0.7 mg/dL).  ID following    Plan:  Vancomycin 1500 mg IV q12h      Isabeal Martinez, LibertyD, BCPS, BCCCP 2/27/2024 10:23 AM

## 2024-02-27 NOTE — PROGRESS NOTES
Renal Dose Adjustment Policy (Generic)     This patient is on medication that requires renal, weight, and/or indication dose adjustment.      Date Body Weight IBW  Adjusted BW SCr  CrCl Dialysis status   2/27/2024 104.3 kg (230 lb) Ideal body weight: 75.3 kg (166 lb 0.1 oz)  Adjusted ideal body weight: 86.9 kg (191 lb 9.7 oz) Serum creatinine: 0.7 mg/dL 02/26/24 2315  Estimated creatinine clearance: 128 mL/min N/a       Pharmacy has dose-adjusted the following medication(s):    Date Previous Order Adjusted Order   2/27/2024 Meropenem 1000 mg q12 Meropenem 1000 mg q8       These changes were made per protocol according to the [facility]   Automatic Renal Dose Adjustment Policy.     *Please note this dose may need readjusted if patient's condition changes.    Please contact pharmacy with any questions regarding these changes.    Liberty CarcamoD, BCPS 2/27/2024 7:43 AM

## 2024-02-27 NOTE — ED NOTES
OR team called, informed that patient still having questions about surgery and has not signed consent yet,  To come and speak with patient.

## 2024-02-27 NOTE — PROGRESS NOTES
Pulled OG tube back 5 cm because xray stated, \"enteric tube is positioned with the tip coiled in the gastric cardia.\" Another xray is ordered to check new placement. It is now 55cm at the lip.

## 2024-02-27 NOTE — CONSULTS
Psychiatry went to see the patient and he is off the floor for surgery.  Psychiatry follow-up this patient at another time.

## 2024-02-28 ENCOUNTER — PREP FOR PROCEDURE (OUTPATIENT)
Dept: VASCULAR SURGERY | Age: 69
End: 2024-02-28

## 2024-02-28 ENCOUNTER — APPOINTMENT (OUTPATIENT)
Age: 69
DRG: 474 | End: 2024-02-28
Payer: MEDICARE

## 2024-02-28 ENCOUNTER — APPOINTMENT (OUTPATIENT)
Dept: GENERAL RADIOLOGY | Age: 69
DRG: 474 | End: 2024-02-28
Payer: MEDICARE

## 2024-02-28 ENCOUNTER — APPOINTMENT (OUTPATIENT)
Dept: INTERVENTIONAL RADIOLOGY/VASCULAR | Age: 69
DRG: 474 | End: 2024-02-28
Payer: MEDICARE

## 2024-02-28 LAB
AADO2: 113.3 MMHG
AADO2: 114.2 MMHG
ALBUMIN SERPL-MCNC: 2.2 G/DL (ref 3.5–5.2)
ALP SERPL-CCNC: 66 U/L (ref 40–129)
ALT SERPL-CCNC: 10 U/L (ref 0–40)
ANION GAP SERPL CALCULATED.3IONS-SCNC: 11 MMOL/L (ref 7–16)
ANION GAP SERPL CALCULATED.3IONS-SCNC: 6 MMOL/L (ref 7–16)
ANION GAP SERPL CALCULATED.3IONS-SCNC: 7 MMOL/L (ref 7–16)
ANION GAP SERPL CALCULATED.3IONS-SCNC: 8 MMOL/L (ref 7–16)
AST SERPL-CCNC: 12 U/L (ref 0–39)
B.E.: -0.4 MMOL/L (ref -3–3)
B.E.: 1.7 MMOL/L (ref -3–3)
BASOPHILS # BLD: 0.02 K/UL (ref 0–0.2)
BASOPHILS NFR BLD: 0 % (ref 0–2)
BILIRUB SERPL-MCNC: 0.2 MG/DL (ref 0–1.2)
BUN SERPL-MCNC: 11 MG/DL (ref 6–23)
BUN SERPL-MCNC: 11 MG/DL (ref 6–23)
BUN SERPL-MCNC: 12 MG/DL (ref 6–23)
BUN SERPL-MCNC: 13 MG/DL (ref 6–23)
BUN SERPL-MCNC: 14 MG/DL (ref 6–23)
BUN SERPL-MCNC: 15 MG/DL (ref 6–23)
CALCIUM SERPL-MCNC: 7.4 MG/DL (ref 8.6–10.2)
CALCIUM SERPL-MCNC: 7.9 MG/DL (ref 8.6–10.2)
CALCIUM SERPL-MCNC: 8 MG/DL (ref 8.6–10.2)
CALCIUM SERPL-MCNC: 8.1 MG/DL (ref 8.6–10.2)
CALCIUM SERPL-MCNC: 8.1 MG/DL (ref 8.6–10.2)
CALCIUM SERPL-MCNC: 8.2 MG/DL (ref 8.6–10.2)
CALCIUM SERPL-MCNC: 8.3 MG/DL (ref 8.6–10.2)
CALCIUM SERPL-MCNC: 8.4 MG/DL (ref 8.6–10.2)
CALCIUM SERPL-MCNC: 8.5 MG/DL (ref 8.6–10.2)
CHLORIDE SERPL-SCNC: 89 MMOL/L (ref 98–107)
CHLORIDE SERPL-SCNC: 89 MMOL/L (ref 98–107)
CHLORIDE SERPL-SCNC: 91 MMOL/L (ref 98–107)
CHLORIDE SERPL-SCNC: 94 MMOL/L (ref 98–107)
CHLORIDE SERPL-SCNC: 95 MMOL/L (ref 98–107)
CHLORIDE SERPL-SCNC: 96 MMOL/L (ref 98–107)
CHLORIDE SERPL-SCNC: 96 MMOL/L (ref 98–107)
CHLORIDE SERPL-SCNC: 97 MMOL/L (ref 98–107)
CHLORIDE SERPL-SCNC: 99 MMOL/L (ref 98–107)
CHOLEST SERPL-MCNC: 84 MG/DL
CO2 SERPL-SCNC: 21 MMOL/L (ref 22–29)
CO2 SERPL-SCNC: 24 MMOL/L (ref 22–29)
CO2 SERPL-SCNC: 25 MMOL/L (ref 22–29)
CO2 SERPL-SCNC: 26 MMOL/L (ref 22–29)
CO2 SERPL-SCNC: 27 MMOL/L (ref 22–29)
COHB: 1.6 % (ref 0–1.5)
COHB: 1.7 % (ref 0–1.5)
COMMENT: ABNORMAL
CREAT SERPL-MCNC: 0.6 MG/DL (ref 0.7–1.2)
CREAT SERPL-MCNC: 0.7 MG/DL (ref 0.7–1.2)
CRITICAL: ABNORMAL
CRITICAL: ABNORMAL
DATE ANALYZED: ABNORMAL
DATE ANALYZED: ABNORMAL
DATE OF COLLECTION: ABNORMAL
DATE OF COLLECTION: ABNORMAL
ECHO AO ASC DIAM: 3.8 CM
ECHO AO ASCENDING AORTA INDEX: 1.67 CM/M2
ECHO AV AREA PEAK VELOCITY: 2.2 CM2
ECHO AV AREA VTI: 2.5 CM2
ECHO AV AREA/BSA PEAK VELOCITY: 1 CM2/M2
ECHO AV AREA/BSA VTI: 1.1 CM2/M2
ECHO AV CUSP MM: 1.4 CM
ECHO AV MEAN GRADIENT: 4 MMHG
ECHO AV MEAN VELOCITY: 1 M/S
ECHO AV PEAK GRADIENT: 9 MMHG
ECHO AV PEAK VELOCITY: 1.5 M/S
ECHO AV VELOCITY RATIO: 0.73
ECHO AV VTI: 28.4 CM
ECHO BSA: 2.29 M2
ECHO BSA: 2.29 M2
ECHO EST RA PRESSURE: 3 MMHG
ECHO LA DIAMETER INDEX: 1.49 CM/M2
ECHO LA DIAMETER: 3.4 CM
ECHO LA VOL A-L A2C: 65 ML (ref 18–58)
ECHO LA VOL A-L A4C: 51 ML (ref 18–58)
ECHO LA VOL MOD A2C: 64 ML (ref 18–58)
ECHO LA VOL MOD A4C: 48 ML (ref 18–58)
ECHO LA VOLUME AREA LENGTH: 60 ML
ECHO LA VOLUME INDEX A-L A2C: 29 ML/M2 (ref 16–34)
ECHO LA VOLUME INDEX A-L A4C: 22 ML/M2 (ref 16–34)
ECHO LA VOLUME INDEX AREA LENGTH: 26 ML/M2 (ref 16–34)
ECHO LA VOLUME INDEX MOD A2C: 28 ML/M2 (ref 16–34)
ECHO LA VOLUME INDEX MOD A4C: 21 ML/M2 (ref 16–34)
ECHO LV EDV A2C: 135 ML
ECHO LV EDV A4C: 164 ML
ECHO LV EDV BP: 158 ML (ref 67–155)
ECHO LV EDV INDEX A4C: 72 ML/M2
ECHO LV EDV INDEX BP: 69 ML/M2
ECHO LV EDV NDEX A2C: 59 ML/M2
ECHO LV EJECTION FRACTION A2C: 52 %
ECHO LV EJECTION FRACTION A4C: 52 %
ECHO LV EJECTION FRACTION BIPLANE: 51 % (ref 55–100)
ECHO LV ESV A2C: 65 ML
ECHO LV ESV A4C: 79 ML
ECHO LV ESV BP: 77 ML (ref 22–58)
ECHO LV ESV INDEX A2C: 29 ML/M2
ECHO LV ESV INDEX A4C: 35 ML/M2
ECHO LV ESV INDEX BP: 34 ML/M2
ECHO LV FRACTIONAL SHORTENING: 26 % (ref 28–44)
ECHO LV INTERNAL DIMENSION DIASTOLE INDEX: 2.37 CM/M2
ECHO LV INTERNAL DIMENSION DIASTOLIC: 5.4 CM (ref 4.2–5.9)
ECHO LV INTERNAL DIMENSION SYSTOLIC INDEX: 1.75 CM/M2
ECHO LV INTERNAL DIMENSION SYSTOLIC: 4 CM
ECHO LV ISOVOLUMETRIC RELAXATION TIME (IVRT): 92.3 MS
ECHO LV IVSD: 1.3 CM (ref 0.6–1)
ECHO LV MASS 2D: 279.8 G (ref 88–224)
ECHO LV MASS INDEX 2D: 122.7 G/M2 (ref 49–115)
ECHO LV POSTERIOR WALL DIASTOLIC: 1.2 CM (ref 0.6–1)
ECHO LV RELATIVE WALL THICKNESS RATIO: 0.44
ECHO LVOT AREA: 3.1 CM2
ECHO LVOT AV VTI INDEX: 0.77
ECHO LVOT DIAM: 2 CM
ECHO LVOT MEAN GRADIENT: 2 MMHG
ECHO LVOT PEAK GRADIENT: 5 MMHG
ECHO LVOT PEAK VELOCITY: 1.1 M/S
ECHO LVOT STROKE VOLUME INDEX: 30 ML/M2
ECHO LVOT SV: 68.5 ML
ECHO LVOT VTI: 21.8 CM
ECHO MV "A" WAVE DURATION: 129.2 MSEC
ECHO MV A VELOCITY: 0.81 M/S
ECHO MV AREA PHT: 2.4 CM2
ECHO MV AREA VTI: 1.8 CM2
ECHO MV E DECELERATION TIME (DT): 175.5 MS
ECHO MV E VELOCITY: 0.89 M/S
ECHO MV E/A RATIO: 1.1
ECHO MV LVOT VTI INDEX: 1.72
ECHO MV MAX VELOCITY: 1.1 M/S
ECHO MV MEAN GRADIENT: 2 MMHG
ECHO MV MEAN VELOCITY: 0.7 M/S
ECHO MV PEAK GRADIENT: 5 MMHG
ECHO MV PRESSURE HALF TIME (PHT): 92.4 MS
ECHO MV VTI: 37.4 CM
ECHO PV MAX VELOCITY: 0.7 M/S
ECHO PV MEAN GRADIENT: 1 MMHG
ECHO PV MEAN VELOCITY: 0.5 M/S
ECHO PV PEAK GRADIENT: 2 MMHG
ECHO PV VTI: 16.3 CM
ECHO PVEIN A DURATION: 101.5 MS
ECHO PVEIN A VELOCITY: 0.2 M/S
ECHO PVEIN PEAK D VELOCITY: 0.5 M/S
ECHO PVEIN PEAK S VELOCITY: 0.5 M/S
ECHO PVEIN S/D RATIO: 1
ECHO RIGHT VENTRICULAR SYSTOLIC PRESSURE (RVSP): 24 MMHG
ECHO RV INTERNAL DIMENSION: 4.3 CM
ECHO RV TAPSE: 2.8 CM (ref 1.7–?)
ECHO TV REGURGITANT MAX VELOCITY: 2.28 M/S
ECHO TV REGURGITANT PEAK GRADIENT: 21 MMHG
EOSINOPHIL # BLD: 0 K/UL (ref 0.05–0.5)
EOSINOPHILS RELATIVE PERCENT: 0 % (ref 0–6)
ERYTHROCYTE [DISTWIDTH] IN BLOOD BY AUTOMATED COUNT: 17.8 % (ref 11.5–15)
FIO2: 40 %
FIO2: 40 %
GFR SERPL CREATININE-BSD FRML MDRD: >60 ML/MIN/1.73M2
GLUCOSE BLD-MCNC: 119 MG/DL (ref 74–99)
GLUCOSE SERPL-MCNC: 107 MG/DL (ref 74–99)
GLUCOSE SERPL-MCNC: 114 MG/DL (ref 74–99)
GLUCOSE SERPL-MCNC: 114 MG/DL (ref 74–99)
GLUCOSE SERPL-MCNC: 117 MG/DL (ref 74–99)
GLUCOSE SERPL-MCNC: 119 MG/DL (ref 74–99)
GLUCOSE SERPL-MCNC: 130 MG/DL (ref 74–99)
GLUCOSE SERPL-MCNC: 138 MG/DL (ref 74–99)
GLUCOSE SERPL-MCNC: 141 MG/DL (ref 74–99)
GLUCOSE SERPL-MCNC: 144 MG/DL (ref 74–99)
GLUCOSE SERPL-MCNC: 145 MG/DL (ref 74–99)
GLUCOSE SERPL-MCNC: 156 MG/DL (ref 74–99)
HBA1C MFR BLD: 6 % (ref 4–5.6)
HCO3: 26.2 MMOL/L (ref 22–26)
HCO3: 27 MMOL/L (ref 22–26)
HCT VFR BLD AUTO: 21.9 % (ref 37–54)
HCT VFR BLD AUTO: 22.2 % (ref 37–54)
HCT VFR BLD AUTO: 28 % (ref 37–54)
HDLC SERPL-MCNC: 26 MG/DL
HGB BLD-MCNC: 6.9 G/DL (ref 12.5–16.5)
HGB BLD-MCNC: 6.9 G/DL (ref 12.5–16.5)
HGB BLD-MCNC: 9 G/DL (ref 12.5–16.5)
HHB: 2.1 % (ref 0–5)
HHB: 3.4 % (ref 0–5)
IMM GRANULOCYTES # BLD AUTO: 0.38 K/UL (ref 0–0.58)
IMM GRANULOCYTES NFR BLD: 2 % (ref 0–5)
LAB: ABNORMAL
LAB: ABNORMAL
LACTATE BLDV-SCNC: 0.8 MMOL/L (ref 0.5–2.2)
LDLC SERPL CALC-MCNC: 45 MG/DL
LYMPHOCYTES NFR BLD: 2.17 K/UL (ref 1.5–4)
LYMPHOCYTES RELATIVE PERCENT: 12 % (ref 20–42)
Lab: 440
Lab: 755
MAGNESIUM SERPL-MCNC: 2 MG/DL (ref 1.6–2.6)
MCH RBC QN AUTO: 23.6 PG (ref 26–35)
MCHC RBC AUTO-ENTMCNC: 31.1 G/DL (ref 32–34.5)
MCV RBC AUTO: 76 FL (ref 80–99.9)
METHB: 0.1 % (ref 0–1.5)
METHB: 0.3 % (ref 0–1.5)
MODE: AC
MODE: AC
MONOCYTES NFR BLD: 0.97 K/UL (ref 0.1–0.95)
MONOCYTES NFR BLD: 5 % (ref 2–12)
NEUTROPHILS NFR BLD: 80 % (ref 43–80)
NEUTS SEG NFR BLD: 14.43 K/UL (ref 1.8–7.3)
O2 CONTENT: 10.3 ML/DL
O2 CONTENT: 11.2 ML/DL
O2 SATURATION: 96.5 % (ref 92–98.5)
O2 SATURATION: 97.9 % (ref 92–98.5)
O2HB: 94.7 % (ref 94–97)
O2HB: 96.1 % (ref 94–97)
OPERATOR ID: 1893
OPERATOR ID: 7291
OSMOLALITY UR: 437 MOSM/KG (ref 300–900)
PATIENT TEMP: 37 C
PATIENT TEMP: 37 C
PCO2: 46.2 MMHG (ref 35–45)
PCO2: 53.9 MMHG (ref 35–45)
PEEP/CPAP: 8 CMH2O
PEEP/CPAP: 8 CMH2O
PFO2: 2.47 MMHG/%
PFO2: 2.72 MMHG/%
PH BLOOD GAS: 7.3 (ref 7.35–7.45)
PH BLOOD GAS: 7.38 (ref 7.35–7.45)
PHOSPHATE SERPL-MCNC: 3.4 MG/DL (ref 2.5–4.5)
PLATELET # BLD AUTO: 443 K/UL (ref 130–450)
PMV BLD AUTO: 8.3 FL (ref 7–12)
PO2: 108.8 MMHG (ref 75–100)
PO2: 99 MMHG (ref 75–100)
POTASSIUM SERPL-SCNC: 4.7 MMOL/L (ref 3.5–5)
POTASSIUM SERPL-SCNC: 4.7 MMOL/L (ref 3.5–5)
POTASSIUM SERPL-SCNC: 5 MMOL/L (ref 3.5–5)
POTASSIUM SERPL-SCNC: 5.1 MMOL/L (ref 3.5–5)
POTASSIUM SERPL-SCNC: 5.2 MMOL/L (ref 3.5–5)
POTASSIUM SERPL-SCNC: 5.3 MMOL/L (ref 3.5–5)
POTASSIUM SERPL-SCNC: 5.3 MMOL/L (ref 3.5–5)
POTASSIUM, UR: 39.3 MMOL/L
PREALB SERPL-MCNC: 4 MG/DL (ref 20–40)
PROT SERPL-MCNC: 5.6 G/DL (ref 6.4–8.3)
RBC # BLD AUTO: 2.92 M/UL (ref 3.8–5.8)
RI(T): 1.04
RI(T): 1.15
RR MECHANICAL: 12 B/MIN
RR MECHANICAL: 16 B/MIN
SODIUM SERPL-SCNC: 121 MMOL/L (ref 132–146)
SODIUM SERPL-SCNC: 122 MMOL/L (ref 132–146)
SODIUM SERPL-SCNC: 124 MMOL/L (ref 132–146)
SODIUM SERPL-SCNC: 126 MMOL/L (ref 132–146)
SODIUM SERPL-SCNC: 127 MMOL/L (ref 132–146)
SODIUM SERPL-SCNC: 128 MMOL/L (ref 132–146)
SODIUM SERPL-SCNC: 128 MMOL/L (ref 132–146)
SODIUM SERPL-SCNC: 130 MMOL/L (ref 132–146)
SODIUM UR-SCNC: <20 MMOL/L
SOURCE, BLOOD GAS: ABNORMAL
SOURCE, BLOOD GAS: ABNORMAL
THB: 7.6 G/DL (ref 11.5–16.5)
THB: 8.1 G/DL (ref 11.5–16.5)
TIME ANALYZED: 440
TIME ANALYZED: 810
TRIGL SERPL-MCNC: 67 MG/DL
VLDLC SERPL CALC-MCNC: 13 MG/DL
VT MECHANICAL: 500 ML
VT MECHANICAL: 500 ML
WBC OTHER # BLD: 18 K/UL (ref 4.5–11.5)

## 2024-02-28 PROCEDURE — 84300 ASSAY OF URINE SODIUM: CPT

## 2024-02-28 PROCEDURE — 85018 HEMOGLOBIN: CPT

## 2024-02-28 PROCEDURE — 2000000000 HC ICU R&B

## 2024-02-28 PROCEDURE — 6360000002 HC RX W HCPCS: Performed by: STUDENT IN AN ORGANIZED HEALTH CARE EDUCATION/TRAINING PROGRAM

## 2024-02-28 PROCEDURE — 85025 COMPLETE CBC W/AUTO DIFF WBC: CPT

## 2024-02-28 PROCEDURE — 93923 UPR/LXTR ART STDY 3+ LVLS: CPT

## 2024-02-28 PROCEDURE — P9016 RBC LEUKOCYTES REDUCED: HCPCS

## 2024-02-28 PROCEDURE — 93306 TTE W/DOPPLER COMPLETE: CPT | Performed by: INTERNAL MEDICINE

## 2024-02-28 PROCEDURE — 2500000003 HC RX 250 WO HCPCS

## 2024-02-28 PROCEDURE — 99253 IP/OBS CNSLTJ NEW/EST LOW 45: CPT | Performed by: NURSE PRACTITIONER

## 2024-02-28 PROCEDURE — 36430 TRANSFUSION BLD/BLD COMPNT: CPT

## 2024-02-28 PROCEDURE — 83036 HEMOGLOBIN GLYCOSYLATED A1C: CPT

## 2024-02-28 PROCEDURE — 83735 ASSAY OF MAGNESIUM: CPT

## 2024-02-28 PROCEDURE — 71045 X-RAY EXAM CHEST 1 VIEW: CPT

## 2024-02-28 PROCEDURE — 2580000003 HC RX 258

## 2024-02-28 PROCEDURE — 82962 GLUCOSE BLOOD TEST: CPT

## 2024-02-28 PROCEDURE — 84133 ASSAY OF URINE POTASSIUM: CPT

## 2024-02-28 PROCEDURE — 83605 ASSAY OF LACTIC ACID: CPT

## 2024-02-28 PROCEDURE — 85014 HEMATOCRIT: CPT

## 2024-02-28 PROCEDURE — 80061 LIPID PANEL: CPT

## 2024-02-28 PROCEDURE — 6360000002 HC RX W HCPCS: Performed by: INTERNAL MEDICINE

## 2024-02-28 PROCEDURE — 80053 COMPREHEN METABOLIC PANEL: CPT

## 2024-02-28 PROCEDURE — 83935 ASSAY OF URINE OSMOLALITY: CPT

## 2024-02-28 PROCEDURE — 2580000003 HC RX 258: Performed by: STUDENT IN AN ORGANIZED HEALTH CARE EDUCATION/TRAINING PROGRAM

## 2024-02-28 PROCEDURE — 6370000000 HC RX 637 (ALT 250 FOR IP)

## 2024-02-28 PROCEDURE — 80048 BASIC METABOLIC PNL TOTAL CA: CPT

## 2024-02-28 PROCEDURE — 84100 ASSAY OF PHOSPHORUS: CPT

## 2024-02-28 PROCEDURE — 6360000002 HC RX W HCPCS

## 2024-02-28 PROCEDURE — 93005 ELECTROCARDIOGRAM TRACING: CPT

## 2024-02-28 PROCEDURE — 82805 BLOOD GASES W/O2 SATURATION: CPT

## 2024-02-28 PROCEDURE — 2580000003 HC RX 258: Performed by: INTERNAL MEDICINE

## 2024-02-28 PROCEDURE — 99291 CRITICAL CARE FIRST HOUR: CPT | Performed by: INTERNAL MEDICINE

## 2024-02-28 PROCEDURE — 6370000000 HC RX 637 (ALT 250 FOR IP): Performed by: STUDENT IN AN ORGANIZED HEALTH CARE EDUCATION/TRAINING PROGRAM

## 2024-02-28 PROCEDURE — 94003 VENT MGMT INPAT SUBQ DAY: CPT

## 2024-02-28 PROCEDURE — 37799 UNLISTED PX VASCULAR SURGERY: CPT

## 2024-02-28 PROCEDURE — A4216 STERILE WATER/SALINE, 10 ML: HCPCS

## 2024-02-28 PROCEDURE — 6360000004 HC RX CONTRAST MEDICATION: Performed by: STUDENT IN AN ORGANIZED HEALTH CARE EDUCATION/TRAINING PROGRAM

## 2024-02-28 PROCEDURE — 93923 UPR/LXTR ART STDY 3+ LVLS: CPT | Performed by: SURGERY

## 2024-02-28 PROCEDURE — 84134 ASSAY OF PREALBUMIN: CPT

## 2024-02-28 PROCEDURE — P9047 ALBUMIN (HUMAN), 25%, 50ML: HCPCS | Performed by: INTERNAL MEDICINE

## 2024-02-28 PROCEDURE — C8929 TTE W OR WO FOL WCON,DOPPLER: HCPCS

## 2024-02-28 PROCEDURE — 99232 SBSQ HOSP IP/OBS MODERATE 35: CPT | Performed by: INTERNAL MEDICINE

## 2024-02-28 RX ORDER — ATROPINE SULFATE 0.1 MG/ML
INJECTION INTRAVENOUS
Status: DISCONTINUED
Start: 2024-02-28 | End: 2024-02-28 | Stop reason: WASHOUT

## 2024-02-28 RX ORDER — ALBUMIN (HUMAN) 12.5 G/50ML
25 SOLUTION INTRAVENOUS EVERY 8 HOURS
Status: COMPLETED | OUTPATIENT
Start: 2024-02-28 | End: 2024-03-01

## 2024-02-28 RX ORDER — DEXTROSE AND SODIUM CHLORIDE 5; .9 G/100ML; G/100ML
INJECTION, SOLUTION INTRAVENOUS CONTINUOUS
Status: DISCONTINUED | OUTPATIENT
Start: 2024-02-28 | End: 2024-02-29

## 2024-02-28 RX ORDER — CALCIUM GLUCONATE 10 MG/ML
1000 INJECTION, SOLUTION INTRAVENOUS ONCE
Status: COMPLETED | OUTPATIENT
Start: 2024-02-28 | End: 2024-02-28

## 2024-02-28 RX ORDER — NOREPINEPHRINE BITARTRATE 0.06 MG/ML
1-100 INJECTION, SOLUTION INTRAVENOUS CONTINUOUS
Status: DISCONTINUED | OUTPATIENT
Start: 2024-02-28 | End: 2024-03-03

## 2024-02-28 RX ORDER — SODIUM CHLORIDE 9 MG/ML
INJECTION, SOLUTION INTRAVENOUS PRN
Status: DISCONTINUED | OUTPATIENT
Start: 2024-02-28 | End: 2024-02-29

## 2024-02-28 RX ORDER — 0.9 % SODIUM CHLORIDE 0.9 %
1000 INTRAVENOUS SOLUTION INTRAVENOUS ONCE
Status: COMPLETED | OUTPATIENT
Start: 2024-02-28 | End: 2024-02-28

## 2024-02-28 RX ORDER — 3% SODIUM CHLORIDE 3 G/100ML
25 INJECTION, SOLUTION INTRAVENOUS CONTINUOUS
Status: ACTIVE | OUTPATIENT
Start: 2024-02-28 | End: 2024-02-28

## 2024-02-28 RX ORDER — NOREPINEPHRINE BITARTRATE 0.06 MG/ML
INJECTION, SOLUTION INTRAVENOUS
Status: COMPLETED
Start: 2024-02-28 | End: 2024-02-28

## 2024-02-28 RX ADMIN — Medication 50 MCG/HR: at 14:25

## 2024-02-28 RX ADMIN — SODIUM CHLORIDE, PRESERVATIVE FREE 20 MG: 5 INJECTION INTRAVENOUS at 07:38

## 2024-02-28 RX ADMIN — CLINDAMYCIN IN 5 PERCENT DEXTROSE 900 MG: 18 INJECTION, SOLUTION INTRAVENOUS at 07:30

## 2024-02-28 RX ADMIN — SODIUM CHLORIDE 25 ML/HR: 3 INJECTION, SOLUTION INTRAVENOUS at 03:30

## 2024-02-28 RX ADMIN — SODIUM CHLORIDE 25 ML/HR: 3 INJECTION, SOLUTION INTRAVENOUS at 15:11

## 2024-02-28 RX ADMIN — CALCIUM GLUCONATE 1000 MG: 10 INJECTION, SOLUTION INTRAVENOUS at 10:34

## 2024-02-28 RX ADMIN — VANCOMYCIN HYDROCHLORIDE 1500 MG: 10 INJECTION, POWDER, LYOPHILIZED, FOR SOLUTION INTRAVENOUS at 00:10

## 2024-02-28 RX ADMIN — Medication 2 MG/HR: at 22:32

## 2024-02-28 RX ADMIN — SODIUM CHLORIDE, PRESERVATIVE FREE 20 MG: 5 INJECTION INTRAVENOUS at 20:32

## 2024-02-28 RX ADMIN — Medication 200 MCG/HR: at 00:48

## 2024-02-28 RX ADMIN — SODIUM CHLORIDE, PRESERVATIVE FREE 10 ML: 5 INJECTION INTRAVENOUS at 20:35

## 2024-02-28 RX ADMIN — PERFLUTREN 1.5 ML: 6.52 INJECTION, SUSPENSION INTRAVENOUS at 09:01

## 2024-02-28 RX ADMIN — MEROPENEM 1000 MG: 1 INJECTION, POWDER, FOR SOLUTION INTRAVENOUS at 08:35

## 2024-02-28 RX ADMIN — VANCOMYCIN HYDROCHLORIDE 1500 MG: 10 INJECTION, POWDER, LYOPHILIZED, FOR SOLUTION INTRAVENOUS at 12:51

## 2024-02-28 RX ADMIN — ALBUMIN (HUMAN) 25 G: 0.25 INJECTION, SOLUTION INTRAVENOUS at 10:36

## 2024-02-28 RX ADMIN — MEROPENEM 1000 MG: 1 INJECTION, POWDER, FOR SOLUTION INTRAVENOUS at 22:38

## 2024-02-28 RX ADMIN — MICONAZOLE NITRATE: 20.6 POWDER TOPICAL at 20:35

## 2024-02-28 RX ADMIN — CHLORHEXIDINE GLUCONATE 15 ML: 1.2 RINSE ORAL at 07:31

## 2024-02-28 RX ADMIN — MEROPENEM 1000 MG: 1 INJECTION, POWDER, FOR SOLUTION INTRAVENOUS at 16:44

## 2024-02-28 RX ADMIN — DEXTROSE AND SODIUM CHLORIDE: 5; 900 INJECTION, SOLUTION INTRAVENOUS at 10:32

## 2024-02-28 RX ADMIN — MICONAZOLE NITRATE: 20.6 POWDER TOPICAL at 06:05

## 2024-02-28 RX ADMIN — CHLORHEXIDINE GLUCONATE 15 ML: 1.2 RINSE ORAL at 20:32

## 2024-02-28 RX ADMIN — Medication 200 MCG/HR: at 06:03

## 2024-02-28 RX ADMIN — Medication 5 MCG/MIN: at 09:16

## 2024-02-28 RX ADMIN — ALBUMIN (HUMAN) 25 G: 0.25 INJECTION, SOLUTION INTRAVENOUS at 18:28

## 2024-02-28 RX ADMIN — SODIUM CHLORIDE 1000 ML: 9 INJECTION, SOLUTION INTRAVENOUS at 08:33

## 2024-02-28 RX ADMIN — SODIUM CHLORIDE, PRESERVATIVE FREE 10 ML: 5 INJECTION INTRAVENOUS at 07:31

## 2024-02-28 RX ADMIN — MICONAZOLE NITRATE: 20.6 POWDER TOPICAL at 07:31

## 2024-02-28 RX ADMIN — LEVOTHYROXINE SODIUM 50 MCG: 0.03 TABLET ORAL at 06:03

## 2024-02-28 ASSESSMENT — PAIN SCALES - GENERAL
PAINLEVEL_OUTOF10: 0

## 2024-02-28 ASSESSMENT — PULMONARY FUNCTION TESTS
PIF_VALUE: 21
PIF_VALUE: 25
PIF_VALUE: 21
PIF_VALUE: 25
PIF_VALUE: 27

## 2024-02-28 NOTE — PROGRESS NOTES
Department of Podiatry  Progress Note    SUBJECTIVE:   Patient is seen at bedside for  Left lower extremity wound. Patient is currently intubated and in the ICU.  Patient underwent Guillotine BKA and left lower extremity wound debridement on 2/27/24. Vascular surgery is planning on taking patient back to the OR for further management at a later time.     OBJECTIVE:    Scheduled Meds:   albumin human 25%  25 g IntraVENous Q8H    meropenem  1,000 mg IntraVENous q8h    vancomycin  1,500 mg IntraVENous Q12H    levothyroxine  50 mcg Oral Daily    sodium chloride flush  5-40 mL IntraVENous 2 times per day    miconazole   Topical BID    chlorhexidine  15 mL Mouth/Throat BID    famotidine (PEPCID) injection  20 mg IntraVENous BID     Continuous Infusions:   sodium chloride      sodium chloride      norepinephrine 5 mcg/min (02/28/24 0916)    dextrose 5 % and 0.9 % NaCl 50 mL/hr at 02/28/24 1032    fentaNYL 200 mcg/hr (02/28/24 0712)    midazolam 2 mg/hr (02/28/24 0712)     PRN Meds:.sodium chloride, sodium chloride, ondansetron **OR** ondansetron, senna, aluminum & magnesium hydroxide-simethicone, acetaminophen **OR** acetaminophen, methyl salicylate, sodium chloride flush, oxyCODONE **OR** oxyCODONE, HYDROmorphone, methocarbamol, hydrALAZINE    Allergies   Allergen Reactions    Metoprolol Other (See Comments)     hallucinations    Pcn [Penicillins]      Unknown reaction       BP (!) 145/56   Pulse 75   Temp 98.4 °F (36.9 °C) (Oral)   Resp (!) 2   Ht 1.803 m (5' 11\")   Wt 108.9 kg (240 lb)   SpO2 90%   BMI 33.47 kg/m²       EXAM:  LOWER EXTREMITY EXAMINATION     Previous examination. Dressing is CDI to the left lower extremity with foot in offloading boot.      VASCULAR:  DP and PT pulses are non-palpable.  Warm to warm from the tibial tuberosity to the distal aspect of the digits dorsally.      NEUROLOGIC:  Protective sensation is grossly intact MILE.     DERM:  Skin is well hydrated to the bilateral lower  necrotizing fasciitis.   2. Ulceration identified along the left heel with radiopaque foreign bodies   identified in the soft tissues. There is some cortical destruction seen of   the left calcaneus to suggest osteomyelitis.   3. Gas identified along the right ankle mortise both medially and laterally.   No cortical regularity to suggest osteomyelitis.   4. Left tibia and fibula is unremarkable with edema seen diffusely throughout   the left lower extremity.         XR CHEST PORTABLE    (Results Pending)     BP (!) 145/56   Pulse 75   Temp 98.4 °F (36.9 °C) (Oral)   Resp (!) 2   Ht 1.803 m (5' 11\")   Wt 108.9 kg (240 lb)   SpO2 90%   BMI 33.47 kg/m²     LABS:    Recent Labs     02/27/24  1610 02/27/24  1849 02/28/24  0429 02/28/24  0751 02/28/24  1100   WBC 21.2*  --  18.0*  --   --    HGB 7.1*   < > 6.9* 6.9* 9.0*   HCT 22.3*   < > 22.2* 21.9* 28.0*   *  --  443  --   --     < > = values in this interval not displayed.        Recent Labs     02/28/24  0930 02/28/24  1100   NA  --  126*   K  --  5.1*   CL  --  94*   CO2  --  24   PHOS 3.4  --    BUN  --  14   CREATININE  --  0.6*        Recent Labs     02/26/24  2315 02/27/24  1610 02/28/24  0429   PROT 7.1 5.7* 5.6*   INR 1.5  --   --      ASSESSMENTS:   - necrotizing fasciitis - right foot  - open wounds - left foot     PLAN:  - Patient was examined and evaluated.Reviewed patient's recent lab results, charts and pertinent diagnostic imaging.Reviewed ancillary service notes.  - Antibiotics as per ID: appreciate recommendations  - Vascular: underwent right guillotine BKA and left lower extremity debridement with Vascular surgery on 2/27/24. Patient to be taken back to the OR date is TBD.   -Will continue to follow for left lower extremity wound.  - Will continue to follow patient while they are in-house.  - Discussed patient with Dr. Mi and Mandy Prabhakar Podiatry PGY2  2/28/2024   1:02 PM

## 2024-02-28 NOTE — PROGRESS NOTES
[facility]  Department of Internal Medicine   Internal Medicine Residency   MICU Progress Note    Patient:  Navin Tran 68 y.o. male  MRN: [id number]     Date of Service: 2/28/2024    Allergy: Metoprolol and Pcn [penicillins]    Overnight Events:   The patient hemoglobin dropped to 6.9.  Patient received 1 unit of RBCs transfusion overnight.  Patient developed respiratory acidosis in the morning ABGs.  Respiratory rate increased to 16.  ABG pending.  Patient was started on 3% normal saline for 4 hours overnight.  Morning sodium 124.  Patient blood cultures growing gram-negative rods Enterobacterales     Subjective     Patient was seen by the bedside in the morning.  Patient developed bradycardia and MAP dropped below 65 suddenly in the morning.  Patient given 2 L of fluid boluses, and a stat EKG was done which shows sinus bradycardia with first-degree AV block.  Morning lab noted.    ROS: unable to be achieved due to current mental status    Objective     VS:   Patient Vitals for the past 12 hrs:   BP Temp Temp src Pulse Resp SpO2 Weight   02/28/24 0759 -- -- -- 51 16 99 % --   02/28/24 0744 (!) 106/47 97.4 °F (36.3 °C) Temporal 52 16 100 % --   02/28/24 0735 (!) 106/46 97.4 °F (36.3 °C) -- 53 16 100 % --   02/28/24 0700 -- -- -- 59 16 99 % --   02/28/24 0645 -- -- -- 63 16 98 % --   02/28/24 0630 (!) 113/48 98.1 °F (36.7 °C) Esophageal 63 16 98 % --   02/28/24 0615 102/61 98.1 °F (36.7 °C) -- 52 16 99 % --   02/28/24 0600 -- -- -- 52 (!) 1 -- 108.9 kg (240 lb)   02/28/24 0541 -- -- -- 51 13 99 % --   02/28/24 0515 -- -- -- 55 (!) 0 99 % --   02/28/24 0500 -- -- -- 58 (!) 0 99 % --   02/28/24 0454 -- -- -- 58 (!) 0 99 % --   02/28/24 0445 -- -- -- 61 (!) 0 98 % 107.3 kg (236 lb 8 oz)   02/28/24 0430 -- -- -- 63 12 97 % --   02/28/24 0415 -- -- -- 66 12 97 % --   02/28/24 0400 -- 98.1 °F (36.7 °C) Esophageal 60 12 98 % --   02/28/24 0345 -- -- -- 60 12 98 % --   02/28/24 0330 -- -- -- 61 12 98 % --  tray per nephrology.  Levothyroxine 50 mcg.  Miconazole powder for intertrigo of scrotum. Keep the area dry.     PT/OT evaluation: not indicated at this time   DVT prophylaxis: held  GI prophylaxis: PEPCID  Diet:   Diet NPO Exceptions are: Sips of Water with Meds   Bowel regimen: Senakot,  Pain management: Oxycodone and dilauded  Code status: Full Code   Disposition: continue current care  Family: updated as available    Geraldo Gonzalez MD, PGY-2  Attending physician: Dr. Jerrod Bryant

## 2024-02-28 NOTE — PROGRESS NOTES
Department of Internal Medicine  Infectious Diseases  Progress  Note      C/C : Nec fasciitis , sepsis       All above noted   Pt is intubated ,sedated   Afebrile         Current Facility-Administered Medications   Medication Dose Route Frequency Provider Last Rate Last Admin    0.9 % sodium chloride infusion   IntraVENous PRN Robert Goyal MD        0.9 % sodium chloride infusion   IntraVENous PRN Geraldo Gonzalez MD        norepinephrine (LEVOPHED) 16 mg in sodium chloride 0.9 % 250 mL infusion  1-100 mcg/min IntraVENous Continuous Geraldo Gonzalez MD 4.7 mL/hr at 02/28/24 0916 5 mcg/min at 02/28/24 0916    atropine 1 MG/10ML injection             meropenem (MERREM) 1,000 mg in sodium chloride 0.9 % 100 mL IVPB ([id number])  1,000 mg IntraVENous q8h Robert Goyal MD 25 mL/hr at 02/28/24 0835 1,000 mg at 02/28/24 0835    clindamycin (CLEOCIN) 900 mg in dextrose 5 % 50 mL IVPB  900 mg IntraVENous Q8H Robert Goyal MD   Stopped at 02/28/24 0830    sodium chloride flush 0.9 % injection 5-40 mL  5-40 mL IntraVENous 2 times per day Robert Goyal MD        sodium chloride flush 0.9 % injection 5-40 mL  5-40 mL IntraVENous PRN Robert Goyal MD        0.9 % sodium chloride infusion   IntraVENous PRN Robert Goyal MD        ondansetron (ZOFRAN-ODT) disintegrating tablet 4 mg  4 mg Oral Q8H PRN Robert Goyal MD        Or    ondansetron (ZOFRAN) injection 4 mg  4 mg IntraVENous Q6H PRN Robert Goyal MD        senna (SENOKOT) tablet 8.6 mg  1 tablet Oral Daily PRN Robert Goyal MD        aluminum & magnesium hydroxide-simethicone (MAALOX) 200-200-20 MG/5ML suspension 30 mL  30 mL Oral Q6H PRN Robert Goyal MD        acetaminophen (TYLENOL) tablet 650 mg  650 mg Oral Q6H PRN Robert Goyal MD        Or    acetaminophen (TYLENOL) suppository 650 mg  650 mg Rectal Q6H PRN Robert Goyal MD        vancomycin (VANCOCIN) 1500 mg in sodium chloride 0.9 % 250 mL IVPB  1,500 mg IntraVENous Q12H Robert Goyal MD   Stopped at 02/28/24

## 2024-02-28 NOTE — OP NOTE
Operative Note      Patient: Navin Tran  YOB: 1955  MRN: [id number]    Date of Procedure: 2/27/2024    Pre-Op Diagnosis Codes:     * Necrotizing fasciitis (HCC) [M72.6]    Post-Op Diagnosis: Same       Procedure(s):  RIGHT LOWER EXTREMITY GUILLOTIN AMPUTATION  LEFT LOWER LEG DEBRIDEMENT DOWN TO BONE INCISION AND DRAINAGE  Left calf 15 cm x 15cm x 1 cm  Left heel 5 cm x 4cm x 2.5 cm     Surgeon(s):  Katerina Wild MD    Assistant:   Surgical Assistant: Chuy Novoa  Resident: Robert Goyal MD    Anesthesia: General    Estimated Blood Loss (mL): 200     Complications: None    Specimens:   ID Type Source Tests Collected by Time Destination   1 : Right lower leg Tissue Tissue CULTURE, ANAEROBIC, CULTURE, FUNGUS (Canceled), GRAM STAIN (Canceled), CULTURE, SURGICAL Katerina Wild MD 2/27/2024 1359    2 : Left lower leg Tissue Tissue CULTURE, ANAEROBIC, CULTURE, FUNGUS, GRAM STAIN (Canceled), CULTURE, SURGICAL Katerina Wild MD 2/27/2024 1404    3 : Left heel bone (Evaluate for osteomyelitis) Specimen Foot CULTURE, SURGICAL Katerina Wild MD 2/27/2024 1407    A : Right lower leg (Sent to [facility]) Specimen Leg SURGICAL PATHOLOGY Katerina Wild MD 2/27/2024 1402        Implants:  * No implants in log *      Drains:   NG/OG/NJ/NE Tube Center mouth (Active)   Surrounding Skin Clean, dry & intact 02/27/24 2200   Securement device Tape 02/27/24 2200   Status Clamped 02/27/24 2200   Placement Verified X-Ray (Initial) 02/27/24 2200   NG/OG/NJ/NE External Measurement (cm) 55 cm 02/27/24 2200       Urinary Catheter 02/27/24 (Active)   Catheter Indications Need for fluid volume management of the critically ill patient in a critical care setting 02/27/24 2200   Site Assessment Badger 02/27/24 2200   Urine Color Yellow 02/27/24 2200   Urine Appearance Clear 02/27/24 2200   Collection Container Standard 02/27/24 2200   Securement Method Leg strap 02/27/24 2200  signed by Katerina Wild MD on 2/27/2024 at 10:14 PM
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
## 2024-02-28 NOTE — CARE COORDINATION
Care Coordination: LOS 1 day. To er from home-  older son called [email or web address]  and pt was pink slipped to ER for further need of care.  Admitted MICU- ID consult, Necrotizing fasciitis, bilateral fee osteomyelitis.  Vascular recommending R BKA-  pt underwent RLE BKA and debridement LLE. 1 u pRBC this am.  Intubated, restraints, Fentanyl, Versed, 3% nacl iv, Merrem, Clindamycin.  Psychiatry has signed off- does not meet criteria for involuntary hold. Met with family in waiting area- wife and brother.  Lives in 2 story home, resides on first level, has Walker, WC and BSC. No hx of hhc or vanessa. Has no pcp- wife states he did follow with [facility] on [employer] some time ago and she plans to set him up for follow up.  Wife feels plan would need to be Short term [facility]ESSA.  [facility], [address] [place] [address].  Referral made to Stephanie and she will follow. Wife states she is PDM and declining other contacts on chart for now.  She inquired about POA. Reviewed that  per [place] Hierarchy of decision makers, she primary if  is not able to make his own decisions. Once pt is extubated and if she wishes to pursue POA and living will, we can contact spiritual care to assist as well.    Electronically signed by Bonita Perez RN on 2/28/2024 at 11:37 AM     The Plan for Transition of Care is related to the following treatment goals: dc    The Patient and/or patient representative wife was provided with a choice of provider and agrees   with the discharge plan. [x] Yes [] No    Freedom of choice list was provided with basic dialogue that supports the patient's individualized plan of care/goals, treatment preferences and shares the quality data associated with the providers. [x] Yes [] No

## 2024-02-28 NOTE — PLAN OF CARE
Patient in bilateral wrist restraints, when released reaching for lines and tubes.       Problem: Pain  Goal: Verbalizes/displays adequate comfort level or baseline comfort level  Outcome: Progressing     Problem: Skin/Tissue Integrity  Goal: Absence of new skin breakdown  Description: 1.  Monitor for areas of redness and/or skin breakdown  2.  Assess vascular access sites hourly  3.  Every 4-6 hours minimum:  Change oxygen saturation probe site  4.  Every 4-6 hours:  If on nasal continuous positive airway pressure, respiratory therapy assess nares and determine need for appliance change or resting period.  Outcome: Progressing     Problem: ABCDS Injury Assessment  Goal: Absence of physical injury  Outcome: Progressing     Problem: Safety - Medical Restraint  Goal: Remains free of injury from restraints (Restraint for Interference with Medical Device)  Description: INTERVENTIONS:  1. Determine that other, less restrictive measures have been tried or would not be effective before applying the restraint  2. Evaluate the patient's condition at the time of restraint application  3. Inform patient/family regarding the reason for restraint  4. Q2H: Monitor safety, psychosocial status, comfort, nutrition and hydration  Outcome: Progressing  Flowsheets  Taken 2/28/2024 0600 by Dasia Presley RN  Remains free of injury from restraints (restraint for interference with medical device): Determine that other, less restrictive measures have been tried or would not be effective before applying the restraint  Taken 2/28/2024 0400 by Dasia Presley RN  Remains free of injury from restraints (restraint for interference with medical device): Determine that other, less restrictive measures have been tried or would not be effective before applying the restraint  Taken 2/28/2024 0200 by Dasia Presley RN  Remains free of injury from restraints (restraint for interference with medical device): Determine that other, less restrictive measures  have been tried or would not be effective before applying the restraint  Taken 2/28/2024 0000 by Dasia Presley RN  Remains free of injury from restraints (restraint for interference with medical device): Determine that other, less restrictive measures have been tried or would not be effective before applying the restraint  Taken 2/27/2024 2200 by Dasia Presley RN  Remains free of injury from restraints (restraint for interference with medical device): Determine that other, less restrictive measures have been tried or would not be effective before applying the restraint     Problem: Discharge Planning  Goal: Discharge to home or other facility with appropriate resources  Outcome: Not Progressing

## 2024-02-28 NOTE — PROGRESS NOTES
Pharmacy Consultation Note  (Antibiotic Dosing and Monitoring)    Initial consult date: 2/27/24  Consulting physician/provider: Dr. Chi  Drug: Vancomycin  Indication: Nec fasc of bilateral lower extremities    Age/  Gender Height Weight IBW  Allergy Information   68 y.o./male 180.3 cm (5' 11\") 104.3 kg (230 lb)     Ideal body weight: 75.3 kg (166 lb 0.1 oz)  Adjusted ideal body weight: 88.7 kg (195 lb 9.7 oz)   Metoprolol and Pcn [penicillins]      Renal Function:  Recent Labs     02/28/24  0429 02/28/24  0748 02/28/24  0912   BUN 12 15 13   CREATININE 0.7 0.6* 0.7         Intake/Output Summary (Last 24 hours) at 2/28/2024 1143  Last data filed at 2/28/2024 1100  Gross per 24 hour   Intake 3895.44 ml   Output 3055 ml   Net 840.44 ml       Vancomycin Monitoring:  Trough:  No results for input(s): \"VANCOTROUGH\" in the last 72 hours.  Random:  No results for input(s): \"VANCORANDOM\" in the last 72 hours.    Vancomycin Administration Times:  Recent vancomycin administrations                     vancomycin (VANCOCIN) 2,000 mg in sodium chloride 0.9 % 500 mL IVPB (mg) 2,000 mg New Bag 02/27/24 0158                    Assessment:  Patient is a 68 y.o. male who has been initiated on vancomycin  Estimated Creatinine Clearance: 130 mL/min (based on SCr of 0.7 mg/dL).  ID following    Plan:  Vancomycin 1500 mg IV q12h  Trough tonight at 2300; Hold if level >20 mcg/mL      Isabela Martinez, PharmD, BCPS, BCCCP 2/28/2024 11:43 AM

## 2024-02-28 NOTE — ACP (ADVANCE CARE PLANNING)
Advance Care Planning   Healthcare Decision Maker:    Primary Decision Maker: talat moran - [facility] - [phone]    Click here to complete Healthcare Decision Makers including selection of the Healthcare Decision Maker Relationship (ie \"Primary\").

## 2024-02-28 NOTE — PROGRESS NOTES
Hospitalist Progress Note      Synopsis: Patient admitted on 2/26/2024 with bilateral leg wounds.   Upon presentation to the ED, patient has a necrotic right foot. Na is low at 118. Previous labs in 10/12 show a Na of 128. CRP is elevated at 210 and Troponin 59-->68. TSH elevated at 18.61 and Free T4 low at 0.8. Leukocytosis of 18.5. Xray of the foot shows extensive air and gas throughout the soft tissues of the right foot to suggest necrotizing fascitis. Ulceration of left heel with evidence of osteomyelitis. Gas indentified along the right ankle mortise both medially and laterally. CT of left foot shows  soft tissue ulceration and soft tissue gas Involving the lateral aspect of the heel pad with underlying bone destruction of the posterolateral calcaneus with osteomyelitis. CT right foot and ankle show bone destruction of calcaneus compatible with OM, extensive tissue ulceration,  Extensive subcutaneous and intra osseous gas within the foot compatible with gas gangrene, no associated abscess. Patient had an arterial bleed and podiatry and vascular ere consulted urgently. Patient initiated on Clindamycin, Vancomycin, and Merrem. Nephrology consulted for hyponatremia. Repeat labs had not be obtained at time of my assumption of care at 7 am. Received IVF. Hemoglobin 8.9. ID, Cardiology, Nephrology, Vascular, Podiatry, and Psychiatry and admitted.  Psychiatry has signed off as patient was pink slipped as he needed medical care. He is agreeing to medical care. Pink slip removed. Started on hypertonic saline with improvement in Na levels. Patient went to the OR urgently for cong GENAO of RLE. He underwent left lower leg debridement down to the bone I&D. He was transferred back to MICU and remains intubated and sedated. Cardiology is recommending limited echo and ASA + Statin when medically stable. Patient to return to the OR with vascular.     Assessment and Plan      Gas Gangrene/Osteomyelitis/Early  displayed.       Recent Labs     02/26/24  2315 02/27/24  1610 02/28/24  0429   PROT 7.1 5.7* 5.6*   ALKPHOS 94 77 66   ALT 16 11 10   AST 20 17 12   BILITOT 0.2 0.5 0.2   LIPASE 22  --   --        Recent Labs     02/26/24  2315   INR 1.5       Recent Labs     02/27/24  1426 02/27/24  1700   CKTOTAL 128 129       Chronic labs:  Lab Results   Component Value Date    CHOL 84 02/28/2024    TRIG 67 02/28/2024    HDL 26 (L) 02/28/2024    TSH 18.61 (H) 02/26/2024    INR 1.5 02/26/2024    LABA1C 6.0 (H) 02/28/2024         +++++++++++++++++++++++++++++++++++++++++++++++++  Karma Tilley DO   [facility].  +++++++++++++++++++++++++++++++++++++++++++++++++  NOTE: This report was transcribed using voice recognition software. Every effort was made to ensure accuracy; however, inadvertent computerized transcription errors may be present.

## 2024-02-28 NOTE — CONSULTS
Department of Psychiatry  Behavioral Health Consult    REASON FOR CONSULT: pink slipped    CONSULTING PHYSICIAN: Dr Tilley     History obtained from: patient interview and chart review     HISTORY OF PRESENT ILLNESS:      The patient is a 68 y.o. male with no documented psychiatric history, presenting to the ER on pink slip from the police, due to his living condition at home and his need for immediate medical attention.  Per report in the ED the patient has had the leg wounds for \" and while\" he reports that he had frostbite 3 years ago.  He was refusing care in the emergency room but did allow staff to look at the wounds, and he did consent to x-rays.  Patient's wife was present at bedside and stated that the patient had actually had the wound since May. She reports that she has been administering local wound care, and that the blackness around the wound started a few days ago and they became nervous about it. Per the patients wife, he only has a history of thyroid disease. He is admitted to ICU for necrotizing fascitis, multiple specialties have been consulted for assistance in this unfortunate case.  Psychiatry has been consulted due to pink slipPsychiatry attempted to see patient yesterday however he was in surgery at that time where he underwent RIGHT LOWER EXTREMITY GUILLOTIN AMPUTATION and   LEFT LOWER LEG DEBRIDEMENT DOWN TO BONE INCISION AND DRAINAGE.  Psychiatry came to see patient this morning where patient is currently intubated and sedated.  I did review the pink slip which indicates the police were called by patient's son who by the pictures of patient's wounds the patient has not been able to care for her.  Pink slip was therefore written due to patient's inability to care for himself.  The pink slip was not written due to any acute psychiatric concerns such as if patient was suicidal or homicidal psychotic or manic appears to be written just to have patient transferred to the hospital to get the  without bone destruction.  Phalanges appear intact. Soft Tissue: There is moderate dorsal subcutaneous edema.  No focal fluid collection.  No evidence of soft tissue gas in the forefoot.  There is a region of ulceration and abnormal soft tissue gas involving the lateral aspect of the heel pad with underlying bone destruction of the posterolateral calcaneus compatible with osteomyelitis.  No focal abscess.  Small radiopaque foreign bodies are noted within the ulceration site.     1. Soft tissue ulceration and soft tissue gas involving the lateral aspect of the heel pad with underlying bone destruction of the posterolateral calcaneus compatible with osteomyelitis. 2. Soft tissue swelling medially and laterally at the ankle with soft tissue ulceration anterolaterally. No underlying bone destruction of the tibia or fibula. 3. No evidence of acute fracture or dislocation of the ankle or foot.     CT ANKLE LEFT WO CONTRAST    Result Date: 2/27/2024  EXAMINATION: CT OF THE LEFT ANKLE WITHOUT CONTRAST; CT OF THE LEFT FOOT WITHOUT CONTRAST 2/27/2024 6:48 am TECHNIQUE: CT of the left ankle was performed without the administration of intravenous contrast.  Multiplanar reformatted images are provided for review. Automated exposure control, iterative reconstruction, and/or weight based adjustment of the mA/kV was utilized to reduce the radiation dose to as low as reasonably achievable.; CT of the left foot was performed without the administration of intravenous contrast.  Multiplanar reformatted images are provided for review. Automated exposure control, iterative reconstruction, and/or weight based adjustment of the mA/kV was utilized to reduce the radiation dose to as low as reasonably achievable. COMPARISON: Radiographs from same day HISTORY ORDERING SYSTEM PROVIDED HISTORY: wounds and odor TECHNOLOGIST PROVIDED HISTORY: Reason for exam:->wounds and odor Decision Support Exception - unselect if not a suspected or confirmed

## 2024-02-29 ENCOUNTER — APPOINTMENT (OUTPATIENT)
Dept: GENERAL RADIOLOGY | Age: 69
DRG: 474 | End: 2024-02-29
Payer: MEDICARE

## 2024-02-29 PROBLEM — E44.0 MODERATE PROTEIN-CALORIE MALNUTRITION (HCC): Status: ACTIVE | Noted: 2024-02-29

## 2024-02-29 LAB
25(OH)D3 SERPL-MCNC: 8.1 NG/ML (ref 30–100)
AADO2: 111.1 MMHG
ANION GAP SERPL CALCULATED.3IONS-SCNC: 7 MMOL/L (ref 7–16)
ANION GAP SERPL CALCULATED.3IONS-SCNC: 8 MMOL/L (ref 7–16)
ANION GAP SERPL CALCULATED.3IONS-SCNC: 8 MMOL/L (ref 7–16)
ANION GAP SERPL CALCULATED.3IONS-SCNC: 9 MMOL/L (ref 7–16)
ANION GAP SERPL CALCULATED.3IONS-SCNC: 9 MMOL/L (ref 7–16)
B.E.: 1.1 MMOL/L (ref -3–3)
BASOPHILS # BLD: 0.02 K/UL (ref 0–0.2)
BASOPHILS NFR BLD: 0 % (ref 0–2)
BUN SERPL-MCNC: 16 MG/DL (ref 6–23)
BUN SERPL-MCNC: 17 MG/DL (ref 6–23)
CA-I BLD-SCNC: 1.13 MMOL/L (ref 1.15–1.33)
CALCIUM SERPL-MCNC: 7.9 MG/DL (ref 8.6–10.2)
CALCIUM SERPL-MCNC: 7.9 MG/DL (ref 8.6–10.2)
CALCIUM SERPL-MCNC: 8 MG/DL (ref 8.6–10.2)
CALCIUM SERPL-MCNC: 8 MG/DL (ref 8.6–10.2)
CALCIUM SERPL-MCNC: 8.2 MG/DL (ref 8.6–10.2)
CHLORIDE SERPL-SCNC: 100 MMOL/L (ref 98–107)
CHLORIDE SERPL-SCNC: 102 MMOL/L (ref 98–107)
CHLORIDE SERPL-SCNC: 95 MMOL/L (ref 98–107)
CHLORIDE SERPL-SCNC: 98 MMOL/L (ref 98–107)
CHLORIDE SERPL-SCNC: 98 MMOL/L (ref 98–107)
CO2 SERPL-SCNC: 23 MMOL/L (ref 22–29)
CO2 SERPL-SCNC: 24 MMOL/L (ref 22–29)
CO2 SERPL-SCNC: 25 MMOL/L (ref 22–29)
COHB: 1.5 % (ref 0–1.5)
CORTIS SERPL-MCNC: 14 UG/DL (ref 2.7–18.4)
CORTIS SERPL-MCNC: 3.7 UG/DL (ref 2.7–18.4)
CORTISOL COLLECTION INFO: NORMAL
CORTISOL COLLECTION INFO: NORMAL
CREAT SERPL-MCNC: 0.6 MG/DL (ref 0.7–1.2)
CRITICAL: ABNORMAL
DATE ANALYZED: ABNORMAL
DATE LAST DOSE: NORMAL
DATE LAST DOSE: NORMAL
DATE OF COLLECTION: ABNORMAL
EKG ATRIAL RATE: 53 BPM
EKG P AXIS: 50 DEGREES
EKG P-R INTERVAL: 288 MS
EKG Q-T INTERVAL: 426 MS
EKG QRS DURATION: 84 MS
EKG QTC CALCULATION (BAZETT): 399 MS
EKG R AXIS: 16 DEGREES
EKG T AXIS: 18 DEGREES
EKG VENTRICULAR RATE: 53 BPM
EOSINOPHIL # BLD: 0.02 K/UL (ref 0.05–0.5)
EOSINOPHILS RELATIVE PERCENT: 0 % (ref 0–6)
ERYTHROCYTE [DISTWIDTH] IN BLOOD BY AUTOMATED COUNT: 17.8 % (ref 11.5–15)
FIO2: 40 %
GFR SERPL CREATININE-BSD FRML MDRD: >60 ML/MIN/1.73M2
GLUCOSE SERPL-MCNC: 117 MG/DL (ref 74–99)
GLUCOSE SERPL-MCNC: 132 MG/DL (ref 74–99)
GLUCOSE SERPL-MCNC: 155 MG/DL (ref 74–99)
GLUCOSE SERPL-MCNC: 158 MG/DL (ref 74–99)
GLUCOSE SERPL-MCNC: 173 MG/DL (ref 74–99)
HCO3: 26.4 MMOL/L (ref 22–26)
HCT VFR BLD AUTO: 23.4 % (ref 37–54)
HCT VFR BLD AUTO: 23.6 % (ref 37–54)
HGB BLD-MCNC: 7.2 G/DL (ref 12.5–16.5)
HGB BLD-MCNC: 7.2 G/DL (ref 12.5–16.5)
HHB: 1.9 % (ref 0–5)
IMM GRANULOCYTES # BLD AUTO: 0.34 K/UL (ref 0–0.58)
IMM GRANULOCYTES NFR BLD: 4 % (ref 0–5)
LAB: ABNORMAL
LYMPHOCYTES NFR BLD: 2.45 K/UL (ref 1.5–4)
LYMPHOCYTES RELATIVE PERCENT: 28 % (ref 20–42)
Lab: 418
MAGNESIUM SERPL-MCNC: 2.2 MG/DL (ref 1.6–2.6)
MCH RBC QN AUTO: 24.2 PG (ref 26–35)
MCHC RBC AUTO-ENTMCNC: 30.5 G/DL (ref 32–34.5)
MCV RBC AUTO: 79.5 FL (ref 80–99.9)
METHB: 0.3 % (ref 0–1.5)
MICROORGANISM SPEC CULT: ABNORMAL
MICROORGANISM SPEC CULT: NORMAL
MICROORGANISM/AGENT SPEC: ABNORMAL
MODE: AC
MONOCYTES NFR BLD: 0.84 K/UL (ref 0.1–0.95)
MONOCYTES NFR BLD: 10 % (ref 2–12)
NEUTROPHILS NFR BLD: 59 % (ref 43–80)
NEUTS SEG NFR BLD: 5.2 K/UL (ref 1.8–7.3)
O2 CONTENT: 11.5 ML/DL
O2 SATURATION: 98.1 % (ref 92–98.5)
O2HB: 96.3 % (ref 94–97)
OPERATOR ID: 2962
PATIENT TEMP: 37 C
PCO2: 45.6 MMHG (ref 35–45)
PEEP/CPAP: 8 CMH2O
PFO2: 2.79 MMHG/%
PH BLOOD GAS: 7.38 (ref 7.35–7.45)
PHOSPHATE SERPL-MCNC: 1.7 MG/DL (ref 2.5–4.5)
PHOSPHATE SERPL-MCNC: 2.3 MG/DL (ref 2.5–4.5)
PLATELET # BLD AUTO: 348 K/UL (ref 130–450)
PMV BLD AUTO: 8.2 FL (ref 7–12)
PO2: 111.7 MMHG (ref 75–100)
POTASSIUM SERPL-SCNC: 4.6 MMOL/L (ref 3.5–5)
POTASSIUM SERPL-SCNC: 4.8 MMOL/L (ref 3.5–5)
POTASSIUM SERPL-SCNC: 4.9 MMOL/L (ref 3.5–5)
RBC # BLD AUTO: 2.97 M/UL (ref 3.8–5.8)
RI(T): 0.99
RR MECHANICAL: 16 B/MIN
SERVICE CMNT-IMP: ABNORMAL
SODIUM SERPL-SCNC: 128 MMOL/L (ref 132–146)
SODIUM SERPL-SCNC: 129 MMOL/L (ref 132–146)
SODIUM SERPL-SCNC: 131 MMOL/L (ref 132–146)
SODIUM SERPL-SCNC: 132 MMOL/L (ref 132–146)
SODIUM SERPL-SCNC: 134 MMOL/L (ref 132–146)
SOURCE, BLOOD GAS: ABNORMAL
SPECIMEN DESCRIPTION: ABNORMAL
SPECIMEN DESCRIPTION: NORMAL
THB: 8.3 G/DL (ref 11.5–16.5)
TIME ANALYZED: 424
TME LAST DOSE: NORMAL H
TME LAST DOSE: NORMAL H
VANCOMYCIN DOSE: NORMAL MG
VANCOMYCIN DOSE: NORMAL MG
VANCOMYCIN TROUGH SERPL-MCNC: 10 UG/ML (ref 5–16)
VANCOMYCIN TROUGH SERPL-MCNC: 9.9 UG/ML (ref 5–16)
VT MECHANICAL: 500 ML
WBC OTHER # BLD: 8.9 K/UL (ref 4.5–11.5)

## 2024-02-29 PROCEDURE — 6360000002 HC RX W HCPCS: Performed by: STUDENT IN AN ORGANIZED HEALTH CARE EDUCATION/TRAINING PROGRAM

## 2024-02-29 PROCEDURE — 6370000000 HC RX 637 (ALT 250 FOR IP): Performed by: STUDENT IN AN ORGANIZED HEALTH CARE EDUCATION/TRAINING PROGRAM

## 2024-02-29 PROCEDURE — 2580000003 HC RX 258: Performed by: STUDENT IN AN ORGANIZED HEALTH CARE EDUCATION/TRAINING PROGRAM

## 2024-02-29 PROCEDURE — 85025 COMPLETE CBC W/AUTO DIFF WBC: CPT

## 2024-02-29 PROCEDURE — 80048 BASIC METABOLIC PNL TOTAL CA: CPT

## 2024-02-29 PROCEDURE — 71045 X-RAY EXAM CHEST 1 VIEW: CPT

## 2024-02-29 PROCEDURE — 2580000003 HC RX 258: Performed by: INTERNAL MEDICINE

## 2024-02-29 PROCEDURE — 85014 HEMATOCRIT: CPT

## 2024-02-29 PROCEDURE — 2580000003 HC RX 258

## 2024-02-29 PROCEDURE — 37799 UNLISTED PX VASCULAR SURGERY: CPT

## 2024-02-29 PROCEDURE — 84100 ASSAY OF PHOSPHORUS: CPT

## 2024-02-29 PROCEDURE — 80202 ASSAY OF VANCOMYCIN: CPT

## 2024-02-29 PROCEDURE — 82306 VITAMIN D 25 HYDROXY: CPT

## 2024-02-29 PROCEDURE — 2500000003 HC RX 250 WO HCPCS

## 2024-02-29 PROCEDURE — 99232 SBSQ HOSP IP/OBS MODERATE 35: CPT | Performed by: INTERNAL MEDICINE

## 2024-02-29 PROCEDURE — 94003 VENT MGMT INPAT SUBQ DAY: CPT

## 2024-02-29 PROCEDURE — 6370000000 HC RX 637 (ALT 250 FOR IP): Performed by: INTERNAL MEDICINE

## 2024-02-29 PROCEDURE — 36592 COLLECT BLOOD FROM PICC: CPT

## 2024-02-29 PROCEDURE — P9047 ALBUMIN (HUMAN), 25%, 50ML: HCPCS | Performed by: INTERNAL MEDICINE

## 2024-02-29 PROCEDURE — 82805 BLOOD GASES W/O2 SATURATION: CPT

## 2024-02-29 PROCEDURE — A4216 STERILE WATER/SALINE, 10 ML: HCPCS

## 2024-02-29 PROCEDURE — 85018 HEMOGLOBIN: CPT

## 2024-02-29 PROCEDURE — 2000000000 HC ICU R&B

## 2024-02-29 PROCEDURE — 82330 ASSAY OF CALCIUM: CPT

## 2024-02-29 PROCEDURE — 6360000002 HC RX W HCPCS: Performed by: INTERNAL MEDICINE

## 2024-02-29 PROCEDURE — 82533 TOTAL CORTISOL: CPT

## 2024-02-29 PROCEDURE — 6370000000 HC RX 637 (ALT 250 FOR IP)

## 2024-02-29 PROCEDURE — 99291 CRITICAL CARE FIRST HOUR: CPT | Performed by: INTERNAL MEDICINE

## 2024-02-29 PROCEDURE — 2500000003 HC RX 250 WO HCPCS: Performed by: INTERNAL MEDICINE

## 2024-02-29 PROCEDURE — 6360000002 HC RX W HCPCS

## 2024-02-29 PROCEDURE — 93010 ELECTROCARDIOGRAM REPORT: CPT | Performed by: INTERNAL MEDICINE

## 2024-02-29 PROCEDURE — A4216 STERILE WATER/SALINE, 10 ML: HCPCS | Performed by: STUDENT IN AN ORGANIZED HEALTH CARE EDUCATION/TRAINING PROGRAM

## 2024-02-29 PROCEDURE — 83735 ASSAY OF MAGNESIUM: CPT

## 2024-02-29 RX ORDER — 3% SODIUM CHLORIDE 3 G/100ML
25 INJECTION, SOLUTION INTRAVENOUS CONTINUOUS
Status: DISPENSED | OUTPATIENT
Start: 2024-02-29 | End: 2024-02-29

## 2024-02-29 RX ORDER — SODIUM CHLORIDE 9 MG/ML
INJECTION, SOLUTION INTRAVENOUS PRN
Status: DISCONTINUED | OUTPATIENT
Start: 2024-02-29 | End: 2024-03-04

## 2024-02-29 RX ORDER — SODIUM CHLORIDE 9 MG/ML
INJECTION, SOLUTION INTRAVENOUS CONTINUOUS
Status: DISCONTINUED | OUTPATIENT
Start: 2024-02-29 | End: 2024-03-01

## 2024-02-29 RX ORDER — COSYNTROPIN 0.25 MG/ML
250 INJECTION, POWDER, FOR SOLUTION INTRAMUSCULAR; INTRAVENOUS ONCE
Status: COMPLETED | OUTPATIENT
Start: 2024-02-29 | End: 2024-02-29

## 2024-02-29 RX ORDER — ERGOCALCIFEROL 1.25 MG/1
50000 CAPSULE ORAL WEEKLY
Status: DISCONTINUED | OUTPATIENT
Start: 2024-02-29 | End: 2024-03-04 | Stop reason: HOSPADM

## 2024-02-29 RX ADMIN — ALBUMIN (HUMAN) 25 G: 0.25 INJECTION, SOLUTION INTRAVENOUS at 11:22

## 2024-02-29 RX ADMIN — MEROPENEM 1000 MG: 1 INJECTION, POWDER, FOR SOLUTION INTRAVENOUS at 08:34

## 2024-02-29 RX ADMIN — Medication 8 MCG/MIN: at 18:30

## 2024-02-29 RX ADMIN — SODIUM CHLORIDE, PRESERVATIVE FREE 10 ML: 5 INJECTION INTRAVENOUS at 09:49

## 2024-02-29 RX ADMIN — ERGOCALCIFEROL 50000 UNITS: 1.25 CAPSULE ORAL at 09:48

## 2024-02-29 RX ADMIN — CALCIUM GLUCONATE 2000 MG: 98 INJECTION, SOLUTION INTRAVENOUS at 14:47

## 2024-02-29 RX ADMIN — SODIUM CHLORIDE, PRESERVATIVE FREE 2 ML: 5 INJECTION INTRAVENOUS at 11:07

## 2024-02-29 RX ADMIN — Medication 100 MCG/HR: at 18:17

## 2024-02-29 RX ADMIN — LEVOTHYROXINE SODIUM 50 MCG: 0.03 TABLET ORAL at 06:25

## 2024-02-29 RX ADMIN — DEXTROSE AND SODIUM CHLORIDE: 5; 900 INJECTION, SOLUTION INTRAVENOUS at 04:38

## 2024-02-29 RX ADMIN — HYDROCORTISONE SODIUM SUCCINATE 100 MG: 100 INJECTION, POWDER, FOR SOLUTION INTRAMUSCULAR; INTRAVENOUS at 11:06

## 2024-02-29 RX ADMIN — ALBUMIN (HUMAN) 25 G: 0.25 INJECTION, SOLUTION INTRAVENOUS at 02:03

## 2024-02-29 RX ADMIN — SODIUM PHOSPHATE, MONOBASIC, MONOHYDRATE AND SODIUM PHOSPHATE, DIBASIC, ANHYDROUS 20 MMOL: 142; 276 INJECTION, SOLUTION INTRAVENOUS at 10:02

## 2024-02-29 RX ADMIN — CHLORHEXIDINE GLUCONATE 15 ML: 1.2 RINSE ORAL at 09:42

## 2024-02-29 RX ADMIN — VANCOMYCIN HYDROCHLORIDE 1500 MG: 10 INJECTION, POWDER, LYOPHILIZED, FOR SOLUTION INTRAVENOUS at 00:52

## 2024-02-29 RX ADMIN — SODIUM CHLORIDE, PRESERVATIVE FREE 20 MG: 5 INJECTION INTRAVENOUS at 08:16

## 2024-02-29 RX ADMIN — MEROPENEM 1000 MG: 1 INJECTION, POWDER, FOR SOLUTION INTRAVENOUS at 16:48

## 2024-02-29 RX ADMIN — SODIUM CHLORIDE: 9 INJECTION, SOLUTION INTRAVENOUS at 12:10

## 2024-02-29 RX ADMIN — SODIUM CHLORIDE 25 ML/HR: 3 INJECTION, SOLUTION INTRAVENOUS at 08:37

## 2024-02-29 RX ADMIN — MICONAZOLE NITRATE: 20.6 POWDER TOPICAL at 10:06

## 2024-02-29 RX ADMIN — SODIUM CHLORIDE, PRESERVATIVE FREE 20 MG: 5 INJECTION INTRAVENOUS at 20:05

## 2024-02-29 RX ADMIN — ALBUMIN (HUMAN) 25 G: 0.25 INJECTION, SOLUTION INTRAVENOUS at 18:35

## 2024-02-29 RX ADMIN — MICONAZOLE NITRATE: 20.6 POWDER TOPICAL at 20:06

## 2024-02-29 RX ADMIN — SODIUM CHLORIDE: 9 INJECTION, SOLUTION INTRAVENOUS at 19:49

## 2024-02-29 RX ADMIN — Medication 75 MCG/HR: at 06:22

## 2024-02-29 RX ADMIN — VANCOMYCIN HYDROCHLORIDE 1500 MG: 10 INJECTION, POWDER, LYOPHILIZED, FOR SOLUTION INTRAVENOUS at 12:10

## 2024-02-29 RX ADMIN — CHLORHEXIDINE GLUCONATE 15 ML: 1.2 RINSE ORAL at 20:05

## 2024-02-29 RX ADMIN — COSYNTROPIN 250 MCG: 0.25 INJECTION, POWDER, LYOPHILIZED, FOR SOLUTION INTRAMUSCULAR; INTRAVENOUS at 09:49

## 2024-02-29 RX ADMIN — HYDROCORTISONE SODIUM SUCCINATE 100 MG: 100 INJECTION, POWDER, FOR SOLUTION INTRAMUSCULAR; INTRAVENOUS at 18:31

## 2024-02-29 RX ADMIN — SODIUM CHLORIDE, PRESERVATIVE FREE 10 ML: 5 INJECTION INTRAVENOUS at 20:06

## 2024-02-29 ASSESSMENT — PULMONARY FUNCTION TESTS
PIF_VALUE: 34
PIF_VALUE: 26
PIF_VALUE: 31
PIF_VALUE: 27
PIF_VALUE: 24

## 2024-02-29 ASSESSMENT — PAIN SCALES - GENERAL
PAINLEVEL_OUTOF10: 1
PAINLEVEL_OUTOF10: 0
PAINLEVEL_OUTOF10: 1
PAINLEVEL_OUTOF10: 0
PAINLEVEL_OUTOF10: 1

## 2024-02-29 NOTE — PROGRESS NOTES
unremarkable with edema seen diffusely throughout   the left lower extremity.         XR TIBIA FIBULA RIGHT (2 VIEWS)   Final Result   1. Extensive air and gas seen throughout the soft tissues of the right foot   to suggest necrotizing fasciitis.   2. Ulceration identified along the left heel with radiopaque foreign bodies   identified in the soft tissues. There is some cortical destruction seen of   the left calcaneus to suggest osteomyelitis.   3. Gas identified along the right ankle mortise both medially and laterally.   No cortical regularity to suggest osteomyelitis.   4. Left tibia and fibula is unremarkable with edema seen diffusely throughout   the left lower extremity.         XR CHEST PORTABLE    (Results Pending)   XR CHEST PORTABLE    (Results Pending)     BP (!) 96/46   Pulse 57   Temp 98.9 °F (37.2 °C) (Axillary)   Resp 13   Ht 1.803 m (5' 11\")   Wt 106.6 kg (235 lb)   SpO2 100%   BMI 32.78 kg/m²     LABS:    Recent Labs     02/28/24  0429 02/28/24  0751 02/28/24  1100 02/29/24  0412   WBC 18.0*  --   --  8.9   HGB 6.9*   < > 9.0* 7.2*   HCT 22.2*   < > 28.0* 23.6*     --   --  348    < > = values in this interval not displayed.          Recent Labs     02/29/24  0406   *   K 4.6   CL 95*   CO2 24   PHOS 1.7*   BUN 16   CREATININE 0.6*          Recent Labs     02/26/24  2315 02/27/24  1610 02/28/24  0429   PROT 7.1 5.7* 5.6*   INR 1.5  --   --        ASSESSMENTS:   - necrotizing fasciitis - right foot  - open wounds - left foot     PLAN:  - Patient was examined and evaluated.Reviewed patient's recent lab results, charts and pertinent diagnostic imaging.Reviewed ancillary service notes.  - Antibiotics as per ID: appreciate recommendations  - Vascular: underwent right guillotine BKA and left lower extremity debridement with Vascular surgery on 2/27/24. Patient to be taken back to the OR 3/1/24.   -Will continue to follow for left lower extremity wound.  - Will continue to follow

## 2024-02-29 NOTE — PLAN OF CARE
Problem: Safety - Medical Restraint  Goal: Remains free of injury from restraints (Restraint for Interference with Medical Device)  2/29/2024 1754 by Trish Santos RN  Outcome: Progressing  Flowsheets  Taken 2/29/2024 1600  Remains free of injury from restraints (restraint for interference with medical device): Every 2 hours: Monitor safety, psychosocial status, comfort, nutrition and hydration  Taken 2/29/2024 1400  Remains free of injury from restraints (restraint for interference with medical device): Every 2 hours: Monitor safety, psychosocial status, comfort, nutrition and hydration  Taken 2/29/2024 1200  Remains free of injury from restraints (restraint for interference with medical device): Every 2 hours: Monitor safety, psychosocial status, comfort, nutrition and hydration  Taken 2/29/2024 1000  Remains free of injury from restraints (restraint for interference with medical device): Every 2 hours: Monitor safety, psychosocial status, comfort, nutrition and hydration  Taken 2/29/2024 0800  Remains free of injury from restraints (restraint for interference with medical device): Every 2 hours: Monitor safety, psychosocial status, comfort, nutrition and hydration  Taken 2/29/2024 0739  Remains free of injury from restraints (restraint for interference with medical device): Every 2 hours: Monitor safety, psychosocial status, comfort, nutrition and hydration

## 2024-02-29 NOTE — PROGRESS NOTES
MIXED GRAM POSITIVE AND GRAM NEGATIVE ORGANISMS    Culture, Blood 1 [[id number]]  (Abnormal) Collected: 02/26/24 2315    Order Status: Completed Specimen: Blood Updated: 02/29/24 0624     Specimen Description .BLOOD     Special Requests          Direct Exam DIRECT GRAM STAIN FROM BOTTLE: GRAM NEGATIVE RODS Previous panic on this admission, call not needed per  SOP.     Culture MORGANELLA MORGANII SSP MORGANII For susceptibility, refer to previous culture. SEE BCUL [id number] 2/26/2024 2315    Culture, Blood 2 [[id number]]  (Abnormal)  (Susceptibility) Collected: 02/26/24 2315    Order Status: Completed Specimen: Blood Updated: 02/29/24 0623     Specimen Description .BLOOD     Special Requests          [employer] test comment AEROBIC BLD BOTTLE     Direct Exam DIRECT GRAM STAIN FROM BOTTLE: GRAM NEGATIVE RODS     Comment: DIRECT GRAM STAIN FROM BOTTLE: ALEXEI KELLY RN 2/27/2024 1243         Culture MORGANELLA MORGANII SSP MORGANII CEFEPIME TO FOLLOW     Candida auris Not Detected     Candida albicans Not Detected     Candida glabrata Not Detected     Candida krusei Not Detected     Candida parapsilosis Not Detected     Candida tropicalis Not Detected     Cryptococcus neoformans/gattii Not Detected     Acinetobacter calcoaceticus-baumannii complex Not Detected     Escherichia Coli Not Detected     Enterobacter Cloacae Complex Not Detected     Enterobacterales DETECTED     Klebsiella oxytoca Not Detected     Klebsiella aerogenes Not Detected     Klebsiella pneumoniae group Not Detected     Pseudomonas aeruginosa Not Detected     Proteus spp Not Detected     Salmonella species Not Detected     SERRATIA MARCESCENS Not Detected     Stenotrophomonas maltophilia Not Detected     Haemophilus influenzae Not Detected     Listeria monocytogenes Not Detected     Bacteroides fragilis Not Detected     Neisseria Meningitidis, NMNI Not Detected     Staphylococcus spp Not Detected     Staphylococcus Aureus Not Detected      of blood transfusion ordered this morning.  Bradycardia 2/2 in the setting of shock  EKG on 2/20/2024 showed sinus bradycardia with AV block.  Hypotonic hyponatremia 2/2 most likely poor intake versus other  Sodium this morning 124  Blood cultures positive for gram-negative rods Enterobacteriaceae  Elevated troponin in the setting of necrotizing fasciitis versus sepsis with anemia  Microcytic anemia hemoglobin dropped from 8.8-6.9.  History of thyroid disease: TSH 13.6, free T40.8  Tobacco use  Alcohol use  Medical noncompliance  Pink slipped by police department > pink slipped removed  No suicidal or homicidal ideation  Intertrigo of Scrotum    Plan:    Patient ot be kept mechanically ventilated unit next surgery. To be taken back to OR on 3/1/24.   Continue levophed, wean as appropriate.  Continue clindamycin 900 mg IV every 8 hours, meropenem 1 g IV every 12 hours and vancomycin per infectious disease.  Monitor HR, concerns for Bradycardia.  Started on 3 % Nacl for Hyponatremia for 4 hours per nephrology. Order noted.  25 g albumin q 8 h per nephrology for 6 doses.  Strict urine output monitoring.  Monitor Hgb, s/p 3 units rbc transfusion.  Fluid restriction, dry tray per nephrology.  Levothyroxine 50 mcg.  Miconazole powder for intertrigo of scrotum. Keep the area dry.     PT/OT evaluation: not indicated at this time   DVT prophylaxis: held  GI prophylaxis: PEPCID  Diet:   Diet NPO Exceptions are: Sips of Water with Meds  ADULT TUBE FEEDING; Nasogastric; Standard with Fiber; Continuous; 20; Yes; 10; Q 4 hours; 70; 100; Q 4 hours   Bowel regimen: Senakot,  Pain management: Oxycodone and dilauded  Code status: Full Code   Disposition: continue current care  Family: updated as available    Geraldo Gonzalez MD, PGY-2  Attending physician: Dr. Jerrod Bryant

## 2024-02-29 NOTE — PROGRESS NOTES
Stage  CI HR MAP TPRI SVI   Baseline 2.6 47 72  49   Challenge 2.7 46 70  51   Result (%?)     5.1%       Not Fluid Responsive

## 2024-02-29 NOTE — PROGRESS NOTES
VASCULAR SURGERY  DAILY PROGRESS NOTE  2/29/2024    CHIEF COMPLAINT:  Chief Complaint   Patient presents with    Wound Check     Pt is pink slipped by [place] [employer]. Per pt got frost bite 3 years ago to bilateral feet. Right foot is black and necrotic. Has been spraying \"blue animal wound  to feet for years\".  Has arterial bleed from one wound when dressing removed.       SUBJECTIVE:  Intubated and sedated. Requiring levo ON.     OBJECTIVE:  BP (!) 96/46   Pulse 50   Temp 98.9 °F (37.2 °C) (Axillary)   Resp 16   Ht 1.803 m (5' 11\")   Wt 106.6 kg (235 lb)   SpO2 100%   BMI 32.78 kg/m²     GENERAL:  Ill appearing, intubated   HEENT: normocephalic   LUNGS:  mechanically ventilated. No acute respiratory distress  CARDIOVASCULAR: hemodynamically stable  SKIN: wounds to LLE hemostatic, healthier appearing s/p debridement   ABDOMEN:  Soft, non-distended, nontender. No guarding, rigidity, rebound.  EXT: RLE BKA guillotine amp, hemostatic, tissue appears healthy     ASSESSMENT/PLAN:  68 y.o. male with necrotizing fasciitis and soft tissue infection    Plan  -monitor hgb, no signs of bleeding on dressing change   -prn pain and nausea control  -monitor labs  -monitor lower extremity exam  -dressing changes daily   -take back timing tomorrow  -patient is likely fluid down given his open wounds and insensate losses, recommend further crystalloid resuscitation to help with getting off pressors     Will DW Dr. Junito Goyal MD  Surgery Resident PGY-3  2/29/2024  6:49 AM     Pt seen and examined  No sx of active bleeding from wounds  Continue resuscitation  Pt is on pressors  + uo  Plan for revision to R BKA, possible AKA, L LE debridement    Disc with family at bedside    I reviewed the procedure with the patient and family as available.  I discussed the procedure, risks, benefits, complications, and alternatives of the procedure.  They understand and consent.  All questions were answered    Katerina ELLIS

## 2024-02-29 NOTE — PROGRESS NOTES
CALCIUM  --    < > 7.9* 8.2*  --  7.9*   PHOS 3.4  --  1.7*  --  2.3*  --     < > = values in this interval not displayed.       Recent Labs     02/26/24  2315 02/27/24  1610 02/28/24  0429   PROT 7.1 5.7* 5.6*   ALKPHOS 94 77 66   ALT 16 11 10   AST 20 17 12   BILITOT 0.2 0.5 0.2   LIPASE 22  --   --        Recent Labs     02/26/24  2315   INR 1.5       Recent Labs     02/27/24  1426 02/27/24  1700   CKTOTAL 128 129       Chronic labs:  Lab Results   Component Value Date    CHOL 84 02/28/2024    TRIG 67 02/28/2024    HDL 26 (L) 02/28/2024    TSH 18.61 (H) 02/26/2024    INR 1.5 02/26/2024    LABA1C 6.0 (H) 02/28/2024         +++++++++++++++++++++++++++++++++++++++++++++++++  Karma Tilley DO   [facility].  +++++++++++++++++++++++++++++++++++++++++++++++++  NOTE: This report was transcribed using voice recognition software. Every effort was made to ensure accuracy; however, inadvertent computerized transcription errors may be present.

## 2024-02-29 NOTE — PLAN OF CARE
Problem: Pain  Goal: Verbalizes/displays adequate comfort level or baseline comfort level  2/28/2024 2134 by Inna Mcdonald RN  Outcome: Progressing  2/28/2024 1008 by Francois Ramos RN  Outcome: Progressing     Problem: Skin/Tissue Integrity  Goal: Absence of new skin breakdown  Description: 1.  Monitor for areas of redness and/or skin breakdown  2.  Assess vascular access sites hourly  3.  Every 4-6 hours minimum:  Change oxygen saturation probe site  4.  Every 4-6 hours:  If on nasal continuous positive airway pressure, respiratory therapy assess nares and determine need for appliance change or resting period.  2/28/2024 2134 by Inna Mcdonald RN  Outcome: Progressing  2/28/2024 1008 by Francois Ramos RN  Outcome: Progressing     Problem: ABCDS Injury Assessment  Goal: Absence of physical injury  2/28/2024 2134 by Inna Mcdonald RN  Outcome: Progressing  2/28/2024 1008 by Francois Ramos RN  Outcome: Progressing     Problem: Safety - Medical Restraint  Goal: Remains free of injury from restraints (Restraint for Interference with Medical Device)  Description: INTERVENTIONS:  1. Determine that other, less restrictive measures have been tried or would not be effective before applying the restraint  2. Evaluate the patient's condition at the time of restraint application  3. Inform patient/family regarding the reason for restraint  4. Q2H: Monitor safety, psychosocial status, comfort, nutrition and hydration  2/28/2024 2134 by Inna Mcdonald RN  Outcome: Progressing  Flowsheets (Taken 2/28/2024 2000)  Remains free of injury from restraints (restraint for interference with medical device):   Determine that other, less restrictive measures have been tried or would not be effective before applying the restraint   Evaluate the patient's condition at the time of restraint application   Inform patient/family regarding the reason for restraint   Every 2 hours: Monitor safety, psychosocial

## 2024-02-29 NOTE — PLAN OF CARE
Problem: Safety - Medical Restraint  Goal: Remains free of injury from restraints (Restraint for Interference with Medical Device)  2/29/2024 0719 by Trish Santos RN  Outcome: Progressing  Flowsheets  Taken 2/29/2024 0600 by Inna Mcdonald RN  Remains free of injury from restraints (restraint for interference with medical device):   Determine that other, less restrictive measures have been tried or would not be effective before applying the restraint   Inform patient/family regarding the reason for restraint   Evaluate the patient's condition at the time of restraint application   Every 2 hours: Monitor safety, psychosocial status, comfort, nutrition and hydration  Taken 2/29/2024 0200 by Inna Mcdonald RN  Remains free of injury from restraints (restraint for interference with medical device):   Determine that other, less restrictive measures have been tried or would not be effective before applying the restraint   Evaluate the patient's condition at the time of restraint application   Inform patient/family regarding the reason for restraint   Every 2 hours: Monitor safety, psychosocial status, comfort, nutrition and hydration  Taken 2/28/2024 2200 by Inna Mcdonald RN  Remains free of injury from restraints (restraint for interference with medical device):   Determine that other, less restrictive measures have been tried or would not be effective before applying the restraint   Evaluate the patient's condition at the time of restraint application   Inform patient/family regarding the reason for restraint   Every 2 hours: Monitor safety, psychosocial status, comfort, nutrition and hydration     Problem: Discharge Planning  Goal: Discharge to home or other facility with appropriate resources  2/28/2024 2134 by Inna Mcdonald RN  Outcome: Not Progressing

## 2024-02-29 NOTE — PROGRESS NOTES
Comprehensive Nutrition Assessment    Type and Reason for Visit:  Initial (New TF)    Nutrition Recommendations/Plan:     Continue NPO, Modify Tube Feeding to better support metabolic demand:     Immune Enhancing (Pivot 1.5) @ 55 ml/hr + 1 protein mod daily.  Provides: 1320 ml tv, 1980 kcals, 123 gm pro (2080 kcals & 149 gm pro w/ mod), 990 ml free water  Regimen meets 100% est calorie & protein needs    *Above formula contains Arganine & Glutamine to aid in wound healing        Malnutrition Assessment:  Malnutrition Status:  Moderate malnutrition (02/29/24 1418)    Context:  Social/Environmental Circumstances     Findings of the 6 clinical characteristics of malnutrition:  Energy Intake:  Less than 75% estimated energy requirements for 3 months or longer  Weight Loss:  Unable to assess (limited wt hx/ amputation)     Body Fat Loss:   (Moderate) Orbital, Triceps   Muscle Mass Loss:   (Moderate) Temples (temporalis), Clavicles (pectoralis & deltoids)  Fluid Accumulation:  No significant fluid accumulation    Strength:  Not Performed    Nutrition Assessment:    Pt admit for wound check pink slipped by PD found to have Sepsis 2/2 BLE Necrotizing fasciitis/ Wet Gangrene (unclear wound etiology- pt states had frost bite & wife states from [place]). Off note pt was self treating wounds at home with blue iodine . Pt now s/p R BKA & L foot debridement down to bone 2/27. Pt remains intubated post-op pending BKA revision/ possible AKA TBD. PMHx ETOH abuse. Pt meets criteria for Moderate Malnutrition. EN support initiated, will provide TF recs to better aid in recovery & monitor.    Nutrition Related Findings:    Pt intubated/ sedated, hypotension on pressor x 1, +I/O's, no edema, active BS, abd distention, OGT w/ TF, hyponatremia , new R BKA     Wound Type: Multiple, Surgical Incision, Open Wounds (s/p R BKA)       Current Nutrition Intake & Therapies:    Average Meal Intake: NPO  Current Tube Feeding (TF)    Too soon to determine     Maria Kwok RD, LD  Contact:

## 2024-02-29 NOTE — PROGRESS NOTES
CONSULT:    Briefly Mr. Tran is a 68-year-old  man with history of chronic nonhealing wounds of lower extremities, and history of frostbite, hypothyroidism, who was admitted on February 27, 2024 after he was pink slipped by [employer].  In the ER he was found to have a necrotic right foot, and bilateral lower extremities necrotizing fasciitis.  Initial laboratory blood work was remarkable for hyponatremia with a sodium level of 118 mEq/L, reason for this consultation.  Patient reports not eating much, possibly once or twice a day sometimes none, but he drinks fluids.    IMPRESSION/RECOMMENDATIONS:       Hypotonic hyponatremia, urine sodium 24, repeated <20, urine osmolality 251. Probably multifactorial including poor solute intake hemodynamically mediated due to intravascular volume depletion?.  Repeated urine sodium today<20, urine osmolality 537.  Sodium levels continue to improve at adequate rate of correction, up to 131 mEq/L with 3% sodium chloride.     Hyperkalemia, probably due to extracellular shift, resolved  Low cortisol level, cortisol: 3.7, to obtain cosyntropin test  Hypotension, hypovolemic and sepsis; on vasopressors  Hypocalcemia, ionized calcium 1.09, 2/2 vitamin D deficiency  Vitamin D deficiency, vitamin D 25 level 8.1, to replace  Normal pH (pH: 7.385) with metabolic alkalosis (bicarbonate 27) and respiratory acidosis  Microcytic anemia, rule out iron deficiency, getting transfused  -------------------------------------------------------  Respiratory failure status post intubation  Bilateral foot ulcers with necrotizing fasciitis and osteomyelitis, status post right BKA, on meropenem and vancomycin  Hypothyroidism, TSH 18.61, on levothyroxine  Nutrition, TF at 50 cc/hour, free water 100 cc every 4 hours     Plan:     Decrease free water to 30 cc every 4 hour  Obtain cosyntropin test  Change IV fluids to NS at 100 cc/hour  Continue albumin 25 g IV every 8 hours   Start  ergocalciferol 50,000 units weekly  Replace phosphorus, sodium phosphate 20 mmol IV   Continue to monitor sodium levels, BMP every 6 hours           Candy Curiel MD

## 2024-02-29 NOTE — PROGRESS NOTES
Pharmacy Consultation Note  (Antibiotic Dosing and Monitoring)    Initial consult date: 2/27/24  Consulting physician/provider: Dr. Chi  Drug: Vancomycin  Indication: Nec fasc of bilateral lower extremities    Age/  Gender Height Weight IBW  Allergy Information   68 y.o./male 180.3 cm (5' 11\") 104.3 kg (230 lb)     Ideal body weight: 75.3 kg (166 lb 0.1 oz)  Adjusted ideal body weight: 87.8 kg (193 lb 9.7 oz)   Metoprolol and Pcn [penicillins]      Renal Function:  Recent Labs     02/29/24  0406 02/29/24  0626 02/29/24  1400   BUN 16 17 16   CREATININE 0.6* 0.6* 0.6*         Intake/Output Summary (Last 24 hours) at 2/29/2024 1547  Last data filed at 2/29/2024 1411  Gross per 24 hour   Intake 3720.95 ml   Output 1280 ml   Net 2440.95 ml         Vancomycin Monitoring:  Trough:    Recent Labs     02/29/24  0058   VANCOTROUGH 10.0     Random:  No results for input(s): \"VANCORANDOM\" in the last 72 hours.    Vancomycin Administration Times:  Recent vancomycin administrations                     vancomycin (VANCOCIN) 2,000 mg in sodium chloride 0.9 % 500 mL IVPB (mg) 2,000 mg New Bag 02/27/24 0158                    Assessment:  Patient is a 68 y.o. male who has been initiated on vancomycin  Estimated Creatinine Clearance: 150 mL/min (A) (based on SCr of 0.6 mg/dL (L)).  ID following    Plan:  Continue vancomycin 1500 mg IV q12h and repeat trough today at 2300; UO dropped from 1.1 to 0.6mL/kg/hr the last 24 hours    Isabela Martinez, PharmD, BCPS, BCCCP 2/29/2024 3:47 PM

## 2024-02-29 NOTE — PROGRESS NOTES
Department of Internal Medicine  Infectious Diseases  Progress  Note      C/C : Nec fasciitis , sepsis       All above noted   Pt is intubated ,awake   Afebrile         Current Facility-Administered Medications   Medication Dose Route Frequency Provider Last Rate Last Admin    0.9 % sodium chloride infusion   IntraVENous PRN Robert Goyal MD        3% sodium chloride (HYPERTONIC) IV infusion  25 mL/hr IntraVENous Continuous Candy Curiel MD 25 mL/hr at 02/29/24 0837 25 mL/hr at 02/29/24 0837    vitamin D (ERGOCALCIFEROL) capsule 50,000 Units  50,000 Units Oral Weekly Candy Curiel MD   50,000 Units at 02/29/24 0948    sodium phosphate 20 mmol in sodium chloride 0.9 % 500 mL IVPB  20 mmol IntraVENous Once Candy Curiel  mL/hr at 02/29/24 1002 20 mmol at 02/29/24 1002    hydrocortisone sodium succinate PF (SOLU-CORTEF) injection 100 mg  100 mg IntraVENous Q8H Geraldo Gonzalez MD   100 mg at 02/29/24 1106    calcium gluconate 2,000 mg in sodium chloride 0.9 % 100 mL IVPB  2,000 mg IntraVENous Once Geraldo Gonzalez MD        0.9 % sodium chloride infusion   IntraVENous PRN Robert Goyal MD        0.9 % sodium chloride infusion   IntraVENous PRN Geraldo Gonzalez MD        norepinephrine (LEVOPHED) 16 mg in sodium chloride 0.9 % 250 mL infusion  1-100 mcg/min IntraVENous Continuous Geraldo Gonzalez MD 3.8 mL/hr at 02/29/24 0730 4 mcg/min at 02/29/24 0730    dextrose 5 % and 0.9 % sodium chloride infusion   IntraVENous Continuous Candy Curiel MD 50 mL/hr at 02/29/24 0551 Rate Verify at 02/29/24 0551    albumin human 25% IV solution 25 g  25 g IntraVENous Q8H Candy Curiel MD   Stopped at 02/29/24 0205    meropenem (MERREM) 1,000 mg in sodium chloride 0.9 % 100 mL IVPB ([id number])  1,000 mg IntraVENous q8h Robert Goyal MD 25 mL/hr at 02/29/24 0834 1,000 mg at 02/29/24 0834    ondansetron (ZOFRAN-ODT) disintegrating tablet 4 mg  4 mg Oral Q8H PRN Robert Goyal MD        Or    ondansetron (ZOFRAN)  Patient A/O x4, VSS on RA, denies pain. Rt foot dressing CDI, elevated on pillow. Voiding freely via urinal, last BM 4/1. IV abx continued. NWB to Rt foot. Plan for PT/OT eval and monitor for s/s bleeding. Safety measures in place. 02/28/2024 04:29 AM    LABALBU 2.2 02/28/2024 04:29 AM    CALCIUM 8.2 02/29/2024 06:26 AM    BILITOT 0.2 02/28/2024 04:29 AM    ALKPHOS 66 02/28/2024 04:29 AM    AST 12 02/28/2024 04:29 AM    ALT 10 02/28/2024 04:29 AM         Hepatic Function Panel:    Lab Results   Component Value Date/Time    ALKPHOS 66 02/28/2024 04:29 AM    ALT 10 02/28/2024 04:29 AM    AST 12 02/28/2024 04:29 AM    PROT 5.6 02/28/2024 04:29 AM    BILITOT 0.2 02/28/2024 04:29 AM    LABALBU 2.2 02/28/2024 04:29 AM       PT/INR:    Lab Results   Component Value Date/Time    PROTIME 15.9 02/26/2024 11:15 PM    INR 1.5 02/26/2024 11:15 PM       TSH:    Lab Results   Component Value Date/Time    TSH 18.61 02/26/2024 11:15 PM       U/A:  No results found for: \"NITRITE\", \"COLORU\", \"PHUR\", \"LABCAST\", \"WBCUA\", \"RBCUA\", \"MUCUS\", \"TRICHOMONAS\", \"YEAST\", \"BACTERIA\", \"CLARITYU\", \"SPECGRAV\", \"LEUKOCYTESUR\", \"UROBILINOGEN\", \"BILIRUBINUR\", \"BLOODU\", \"GLUCOSEU\", \"AMORPHOUS\"    ABG:  No results found for: \"GWX6JGU\", \"BEART\", \"A4UYPPGW\", \"PHART\", \"THGBART\", \"NGO3ZFI\", \"PO2ART\", \"KDK9QJH\"    MICROBIOLOGY:    Blood culture -     Culture, Blood 2 [[id number]] (Abnormal) Collected: 02/26/24 5454   Order Status: Completed Specimen: Blood Updated: 02/28/24 0603    Specimen Description .BLOOD    Special Requests         [employer] test comment AEROBIC BLD BOTTLE    Direct Exam DIRECT GRAM STAIN FROM BOTTLE: GRAM NEGATIVE RODS    Comment: DIRECT GRAM STAIN FROM BOTTLE: ALEXEI KELLY RN 2/27/2024 1243       Culture GRAM NEGATIVE RODS Identification and sensitivity to follow. Abnormal     Candida auris Not Detected    Candida albicans Not Detected    Candida glabrata Not Detected    Candida krusei Not Detected    Candida parapsilosis Not Detected    Candida tropicalis Not Detected    Cryptococcus neoformans/gattii Not Detected    Acinetobacter calcoaceticus-baumannii complex Not Detected    Escherichia Coli Not Detected    Enterobacter Cloacae Complex Not Detected

## 2024-03-01 ENCOUNTER — APPOINTMENT (OUTPATIENT)
Dept: GENERAL RADIOLOGY | Age: 69
DRG: 474 | End: 2024-03-01
Payer: MEDICARE

## 2024-03-01 ENCOUNTER — ANESTHESIA EVENT (OUTPATIENT)
Dept: OPERATING ROOM | Age: 69
End: 2024-03-01
Payer: MEDICARE

## 2024-03-01 ENCOUNTER — ANESTHESIA (OUTPATIENT)
Dept: OPERATING ROOM | Age: 69
End: 2024-03-01
Payer: MEDICARE

## 2024-03-01 LAB
AADO2: 119.5 MMHG
ACB COMPLEX DNA BLD POS QL NAA+NON-PROBE: NOT DETECTED
ANION GAP SERPL CALCULATED.3IONS-SCNC: 5 MMOL/L (ref 7–16)
ANION GAP SERPL CALCULATED.3IONS-SCNC: 7 MMOL/L (ref 7–16)
ANION GAP SERPL CALCULATED.3IONS-SCNC: 7 MMOL/L (ref 7–16)
ANION GAP SERPL CALCULATED.3IONS-SCNC: 8 MMOL/L (ref 7–16)
ANION GAP SERPL CALCULATED.3IONS-SCNC: 9 MMOL/L (ref 7–16)
B FRAGILIS DNA BLD POS QL NAA+NON-PROBE: NOT DETECTED
B.E.: 2.6 MMOL/L (ref -3–3)
BASOPHILS # BLD: 0.01 K/UL (ref 0–0.2)
BASOPHILS # BLD: 0.03 K/UL (ref 0–0.2)
BASOPHILS NFR BLD: 0 % (ref 0–2)
BASOPHILS NFR BLD: 0 % (ref 0–2)
BIOFIRE TEST COMMENT: ABNORMAL
BLACTX-M ISLT/SPM QL: NOT DETECTED
BLAIMP ISLT/SPM QL: NOT DETECTED
BLAKPC ISLT/SPM QL: NOT DETECTED
BLAOXA-48-LIKE ISLT/SPM QL: NOT DETECTED
BLAVIM ISLT/SPM QL: NOT DETECTED
BUN SERPL-MCNC: 16 MG/DL (ref 6–23)
BUN SERPL-MCNC: 17 MG/DL (ref 6–23)
BUN SERPL-MCNC: 18 MG/DL (ref 6–23)
BUN SERPL-MCNC: 19 MG/DL (ref 6–23)
BUN SERPL-MCNC: 20 MG/DL (ref 6–23)
C ALBICANS DNA BLD POS QL NAA+NON-PROBE: NOT DETECTED
C AURIS DNA BLD POS QL NAA+NON-PROBE: NOT DETECTED
C GATTII+NEOFOR DNA BLD POS QL NAA+N-PRB: NOT DETECTED
C GLABRATA DNA BLD POS QL NAA+NON-PROBE: NOT DETECTED
C KRUSEI DNA BLD POS QL NAA+NON-PROBE: NOT DETECTED
C PARAP DNA BLD POS QL NAA+NON-PROBE: NOT DETECTED
C TROPICLS DNA BLD POS QL NAA+NON-PROBE: NOT DETECTED
CA-I BLD-SCNC: 1.17 MMOL/L (ref 1.15–1.33)
CALCIUM SERPL-MCNC: 8.1 MG/DL (ref 8.6–10.2)
CALCIUM SERPL-MCNC: 8.2 MG/DL (ref 8.6–10.2)
CALCIUM SERPL-MCNC: 8.4 MG/DL (ref 8.6–10.2)
CALCIUM SERPL-MCNC: 8.4 MG/DL (ref 8.6–10.2)
CALCIUM SERPL-MCNC: 8.6 MG/DL (ref 8.6–10.2)
CHLORIDE SERPL-SCNC: 101 MMOL/L (ref 98–107)
CHLORIDE SERPL-SCNC: 103 MMOL/L (ref 98–107)
CHLORIDE SERPL-SCNC: 104 MMOL/L (ref 98–107)
CO2 SERPL-SCNC: 24 MMOL/L (ref 22–29)
CO2 SERPL-SCNC: 26 MMOL/L (ref 22–29)
CO2 SERPL-SCNC: 29 MMOL/L (ref 22–29)
COHB: 1.7 % (ref 0–1.5)
CORTIS SERPL-MCNC: 31.7 UG/DL (ref 2.7–18.4)
CORTISOL COLLECTION INFO: ABNORMAL
CREAT SERPL-MCNC: 0.5 MG/DL (ref 0.7–1.2)
CREAT SERPL-MCNC: 0.6 MG/DL (ref 0.7–1.2)
CRITICAL: ABNORMAL
DATE ANALYZED: ABNORMAL
DATE OF COLLECTION: ABNORMAL
E CLOAC COMP DNA BLD POS NAA+NON-PROBE: NOT DETECTED
E COLI DNA BLD POS QL NAA+NON-PROBE: NOT DETECTED
E FAECALIS DNA BLD POS QL NAA+NON-PROBE: NOT DETECTED
E FAECIUM DNA BLD POS QL NAA+NON-PROBE: NOT DETECTED
ENTEROBACTERALES DNA BLD POS NAA+N-PRB: DETECTED
EOSINOPHIL # BLD: 0 K/UL (ref 0.05–0.5)
EOSINOPHIL # BLD: 0 K/UL (ref 0.05–0.5)
EOSINOPHILS RELATIVE PERCENT: 0 % (ref 0–6)
EOSINOPHILS RELATIVE PERCENT: 0 % (ref 0–6)
ERYTHROCYTE [DISTWIDTH] IN BLOOD BY AUTOMATED COUNT: 18.5 % (ref 11.5–15)
ERYTHROCYTE [DISTWIDTH] IN BLOOD BY AUTOMATED COUNT: 19.4 % (ref 11.5–15)
FIO2: 40 %
GFR SERPL CREATININE-BSD FRML MDRD: >60 ML/MIN/1.73M2
GLUCOSE SERPL-MCNC: 127 MG/DL (ref 74–99)
GLUCOSE SERPL-MCNC: 127 MG/DL (ref 74–99)
GLUCOSE SERPL-MCNC: 135 MG/DL (ref 74–99)
GLUCOSE SERPL-MCNC: 136 MG/DL (ref 74–99)
GLUCOSE SERPL-MCNC: 158 MG/DL (ref 74–99)
GP B STREP DNA BLD POS QL NAA+NON-PROBE: NOT DETECTED
HAEM INFLU DNA BLD POS QL NAA+NON-PROBE: NOT DETECTED
HCO3: 27.2 MMOL/L (ref 22–26)
HCT VFR BLD AUTO: 23 % (ref 37–54)
HCT VFR BLD AUTO: 29.1 % (ref 37–54)
HGB BLD-MCNC: 7.1 G/DL (ref 12.5–16.5)
HGB BLD-MCNC: 9 G/DL (ref 12.5–16.5)
HHB: 2.2 % (ref 0–5)
IMM GRANULOCYTES # BLD AUTO: 0.13 K/UL (ref 0–0.58)
IMM GRANULOCYTES # BLD AUTO: 0.3 K/UL (ref 0–0.58)
IMM GRANULOCYTES NFR BLD: 2 % (ref 0–5)
IMM GRANULOCYTES NFR BLD: 2 % (ref 0–5)
K OXYTOCA DNA BLD POS QL NAA+NON-PROBE: NOT DETECTED
KLEBSIELLA SP DNA BLD POS QL NAA+NON-PRB: NOT DETECTED
KLEBSIELLA SP DNA BLD POS QL NAA+NON-PRB: NOT DETECTED
L MONOCYTOG DNA BLD POS QL NAA+NON-PROBE: NOT DETECTED
LAB: ABNORMAL
LYMPHOCYTES NFR BLD: 1.72 K/UL (ref 1.5–4)
LYMPHOCYTES NFR BLD: 2.58 K/UL (ref 1.5–4)
LYMPHOCYTES RELATIVE PERCENT: 16 % (ref 20–42)
LYMPHOCYTES RELATIVE PERCENT: 28 % (ref 20–42)
Lab: 416
MAGNESIUM SERPL-MCNC: 2.2 MG/DL (ref 1.6–2.6)
MCH RBC QN AUTO: 24.5 PG (ref 26–35)
MCH RBC QN AUTO: 25.4 PG (ref 26–35)
MCHC RBC AUTO-ENTMCNC: 30.9 G/DL (ref 32–34.5)
MCHC RBC AUTO-ENTMCNC: 30.9 G/DL (ref 32–34.5)
MCV RBC AUTO: 79.3 FL (ref 80–99.9)
MCV RBC AUTO: 82 FL (ref 80–99.9)
METHB: 0.3 % (ref 0–1.5)
MICROORGANISM SPEC CULT: ABNORMAL
MICROORGANISM/AGENT SPEC: ABNORMAL
MODE: AC
MONOCYTES NFR BLD: 0.66 K/UL (ref 0.1–0.95)
MONOCYTES NFR BLD: 1.22 K/UL (ref 0.1–0.95)
MONOCYTES NFR BLD: 11 % (ref 2–12)
MONOCYTES NFR BLD: 8 % (ref 2–12)
N MEN DNA BLD POS QL NAA+NON-PROBE: NOT DETECTED
NEUTROPHILS NFR BLD: 60 % (ref 43–80)
NEUTROPHILS NFR BLD: 75 % (ref 43–80)
NEUTS SEG NFR BLD: 12.18 K/UL (ref 1.8–7.3)
NEUTS SEG NFR BLD: 3.72 K/UL (ref 1.8–7.3)
O2 CONTENT: 10.6 ML/DL
O2 SATURATION: 97.8 % (ref 92–98.5)
O2HB: 95.8 % (ref 94–97)
OPERATOR ID: 2962
P AERUGINOSA DNA BLD POS NAA+NON-PROBE: NOT DETECTED
PATIENT TEMP: 37 C
PCO2: 42 MMHG (ref 35–45)
PEEP/CPAP: 8 CMH2O
PFO2: 2.68 MMHG/%
PH BLOOD GAS: 7.43 (ref 7.35–7.45)
PHOSPHATE SERPL-MCNC: 2.4 MG/DL (ref 2.5–4.5)
PLATELET # BLD AUTO: 337 K/UL (ref 130–450)
PLATELET # BLD AUTO: 374 K/UL (ref 130–450)
PMV BLD AUTO: 8.3 FL (ref 7–12)
PMV BLD AUTO: 8.3 FL (ref 7–12)
PO2: 107.4 MMHG (ref 75–100)
POTASSIUM SERPL-SCNC: 4.5 MMOL/L (ref 3.5–5)
POTASSIUM SERPL-SCNC: 4.7 MMOL/L (ref 3.5–5)
POTASSIUM SERPL-SCNC: 4.8 MMOL/L (ref 3.5–5)
PROTEUS SP DNA BLD POS QL NAA+NON-PROBE: NOT DETECTED
RBC # BLD AUTO: 2.9 M/UL (ref 3.8–5.8)
RBC # BLD AUTO: 3.55 M/UL (ref 3.8–5.8)
RESISTANT GENE NDM BY PCR: NOT DETECTED
RI(T): 1.11
RR MECHANICAL: 16 B/MIN
S AUREUS DNA BLD POS QL NAA+NON-PROBE: NOT DETECTED
S AUREUS+CONS DNA BLD POS NAA+NON-PROBE: NOT DETECTED
S EPIDERMIDIS DNA BLD POS QL NAA+NON-PRB: NOT DETECTED
S LUGDUNENSIS DNA BLD POS QL NAA+NON-PRB: NOT DETECTED
S MALTOPHILIA DNA BLD POS QL NAA+NON-PRB: NOT DETECTED
S MARCESCENS DNA BLD POS NAA+NON-PROBE: NOT DETECTED
S PNEUM DNA BLD POS QL NAA+NON-PROBE: NOT DETECTED
S PYO DNA BLD POS QL NAA+NON-PROBE: NOT DETECTED
SALMONELLA DNA BLD POS QL NAA+NON-PROBE: NOT DETECTED
SERVICE CMNT-IMP: ABNORMAL
SODIUM SERPL-SCNC: 135 MMOL/L (ref 132–146)
SODIUM SERPL-SCNC: 136 MMOL/L (ref 132–146)
SODIUM SERPL-SCNC: 137 MMOL/L (ref 132–146)
SODIUM SERPL-SCNC: 137 MMOL/L (ref 132–146)
SODIUM SERPL-SCNC: 138 MMOL/L (ref 132–146)
SOURCE, BLOOD GAS: ABNORMAL
SPECIMEN DESCRIPTION: ABNORMAL
STREPTOCOCCUS DNA BLD POS NAA+NON-PROBE: NOT DETECTED
SURGICAL PATHOLOGY REPORT: NORMAL
THB: 7.7 G/DL (ref 11.5–16.5)
TIME ANALYZED: 425
VT MECHANICAL: 500 ML
WBC OTHER # BLD: 16.3 K/UL (ref 4.5–11.5)
WBC OTHER # BLD: 6.2 K/UL (ref 4.5–11.5)

## 2024-03-01 PROCEDURE — 11046 DBRDMT MUSC&/FSCA EA ADDL: CPT | Performed by: SURGERY

## 2024-03-01 PROCEDURE — 27886 AMPUTATION FOLLOW-UP SURGERY: CPT | Performed by: SURGERY

## 2024-03-01 PROCEDURE — 6370000000 HC RX 637 (ALT 250 FOR IP)

## 2024-03-01 PROCEDURE — 2709999900 HC NON-CHARGEABLE SUPPLY: Performed by: SURGERY

## 2024-03-01 PROCEDURE — 2580000003 HC RX 258

## 2024-03-01 PROCEDURE — 0KBT0ZZ EXCISION OF LEFT LOWER LEG MUSCLE, OPEN APPROACH: ICD-10-PCS | Performed by: SURGERY

## 2024-03-01 PROCEDURE — 2580000003 HC RX 258: Performed by: STUDENT IN AN ORGANIZED HEALTH CARE EDUCATION/TRAINING PROGRAM

## 2024-03-01 PROCEDURE — 3600000003 HC SURGERY LEVEL 3 BASE: Performed by: SURGERY

## 2024-03-01 PROCEDURE — 88307 TISSUE EXAM BY PATHOLOGIST: CPT

## 2024-03-01 PROCEDURE — A4216 STERILE WATER/SALINE, 10 ML: HCPCS | Performed by: STUDENT IN AN ORGANIZED HEALTH CARE EDUCATION/TRAINING PROGRAM

## 2024-03-01 PROCEDURE — 85025 COMPLETE CBC W/AUTO DIFF WBC: CPT

## 2024-03-01 PROCEDURE — 2500000003 HC RX 250 WO HCPCS: Performed by: STUDENT IN AN ORGANIZED HEALTH CARE EDUCATION/TRAINING PROGRAM

## 2024-03-01 PROCEDURE — 82533 TOTAL CORTISOL: CPT

## 2024-03-01 PROCEDURE — 84100 ASSAY OF PHOSPHORUS: CPT

## 2024-03-01 PROCEDURE — 6360000002 HC RX W HCPCS: Performed by: STUDENT IN AN ORGANIZED HEALTH CARE EDUCATION/TRAINING PROGRAM

## 2024-03-01 PROCEDURE — 11042 DBRDMT SUBQ TIS 1ST 20SQCM/<: CPT | Performed by: SURGERY

## 2024-03-01 PROCEDURE — 6370000000 HC RX 637 (ALT 250 FOR IP): Performed by: STUDENT IN AN ORGANIZED HEALTH CARE EDUCATION/TRAINING PROGRAM

## 2024-03-01 PROCEDURE — 6360000002 HC RX W HCPCS

## 2024-03-01 PROCEDURE — 2500000003 HC RX 250 WO HCPCS

## 2024-03-01 PROCEDURE — 37799 UNLISTED PX VASCULAR SURGERY: CPT

## 2024-03-01 PROCEDURE — 3700000000 HC ANESTHESIA ATTENDED CARE: Performed by: SURGERY

## 2024-03-01 PROCEDURE — 0KBS0ZZ EXCISION OF RIGHT LOWER LEG MUSCLE, OPEN APPROACH: ICD-10-PCS | Performed by: SURGERY

## 2024-03-01 PROCEDURE — 94640 AIRWAY INHALATION TREATMENT: CPT

## 2024-03-01 PROCEDURE — 83735 ASSAY OF MAGNESIUM: CPT

## 2024-03-01 PROCEDURE — 82330 ASSAY OF CALCIUM: CPT

## 2024-03-01 PROCEDURE — 99232 SBSQ HOSP IP/OBS MODERATE 35: CPT | Performed by: INTERNAL MEDICINE

## 2024-03-01 PROCEDURE — 99291 CRITICAL CARE FIRST HOUR: CPT | Performed by: INTERNAL MEDICINE

## 2024-03-01 PROCEDURE — P9016 RBC LEUKOCYTES REDUCED: HCPCS

## 2024-03-01 PROCEDURE — 82805 BLOOD GASES W/O2 SATURATION: CPT

## 2024-03-01 PROCEDURE — 11045 DBRDMT SUBQ TISS EACH ADDL: CPT | Performed by: SURGERY

## 2024-03-01 PROCEDURE — 6360000002 HC RX W HCPCS: Performed by: INTERNAL MEDICINE

## 2024-03-01 PROCEDURE — P9047 ALBUMIN (HUMAN), 25%, 50ML: HCPCS | Performed by: INTERNAL MEDICINE

## 2024-03-01 PROCEDURE — 3700000001 HC ADD 15 MINUTES (ANESTHESIA): Performed by: SURGERY

## 2024-03-01 PROCEDURE — 0Y6H0Z1 DETACHMENT AT RIGHT LOWER LEG, HIGH, OPEN APPROACH: ICD-10-PCS | Performed by: SURGERY

## 2024-03-01 PROCEDURE — 2000000000 HC ICU R&B

## 2024-03-01 PROCEDURE — 3600000013 HC SURGERY LEVEL 3 ADDTL 15MIN: Performed by: SURGERY

## 2024-03-01 PROCEDURE — 0JBP0ZZ EXCISION OF LEFT LOWER LEG SUBCUTANEOUS TISSUE AND FASCIA, OPEN APPROACH: ICD-10-PCS | Performed by: SURGERY

## 2024-03-01 PROCEDURE — 11043 DBRDMT MUSC&/FSCA 1ST 20/<: CPT | Performed by: SURGERY

## 2024-03-01 PROCEDURE — L3650 SO 8 ABD RESTRAINT PRE OTS: HCPCS | Performed by: SURGERY

## 2024-03-01 PROCEDURE — 94003 VENT MGMT INPAT SUBQ DAY: CPT

## 2024-03-01 PROCEDURE — 88311 DECALCIFY TISSUE: CPT

## 2024-03-01 PROCEDURE — 94669 MECHANICAL CHEST WALL OSCILL: CPT

## 2024-03-01 PROCEDURE — 71045 X-RAY EXAM CHEST 1 VIEW: CPT

## 2024-03-01 PROCEDURE — 94664 DEMO&/EVAL PT USE INHALER: CPT

## 2024-03-01 RX ORDER — SODIUM CHLORIDE 9 MG/ML
INJECTION, SOLUTION INTRAVENOUS CONTINUOUS PRN
Status: DISCONTINUED | OUTPATIENT
Start: 2024-03-01 | End: 2024-03-01 | Stop reason: SDUPTHER

## 2024-03-01 RX ORDER — IPRATROPIUM BROMIDE AND ALBUTEROL SULFATE 2.5; .5 MG/3ML; MG/3ML
1 SOLUTION RESPIRATORY (INHALATION)
Status: DISCONTINUED | OUTPATIENT
Start: 2024-03-01 | End: 2024-03-03

## 2024-03-01 RX ORDER — VECURONIUM BROMIDE 1 MG/ML
INJECTION, POWDER, LYOPHILIZED, FOR SOLUTION INTRAVENOUS PRN
Status: DISCONTINUED | OUTPATIENT
Start: 2024-03-01 | End: 2024-03-01 | Stop reason: SDUPTHER

## 2024-03-01 RX ORDER — PROPOFOL 10 MG/ML
INJECTION, EMULSION INTRAVENOUS PRN
Status: DISCONTINUED | OUTPATIENT
Start: 2024-03-01 | End: 2024-03-01 | Stop reason: SDUPTHER

## 2024-03-01 RX ORDER — FENTANYL CITRATE 50 UG/ML
INJECTION, SOLUTION INTRAMUSCULAR; INTRAVENOUS PRN
Status: DISCONTINUED | OUTPATIENT
Start: 2024-03-01 | End: 2024-03-01 | Stop reason: SDUPTHER

## 2024-03-01 RX ORDER — SODIUM CHLORIDE 9 MG/ML
INJECTION, SOLUTION INTRAVENOUS PRN
Status: DISCONTINUED | OUTPATIENT
Start: 2024-03-01 | End: 2024-03-04

## 2024-03-01 RX ORDER — SODIUM CHLORIDE FOR INHALATION 3 %
2 VIAL, NEBULIZER (ML) INHALATION 2 TIMES DAILY
Status: DISCONTINUED | OUTPATIENT
Start: 2024-03-01 | End: 2024-03-03

## 2024-03-01 RX ADMIN — SODIUM CHLORIDE, PRESERVATIVE FREE 20 MG: 5 INJECTION INTRAVENOUS at 20:07

## 2024-03-01 RX ADMIN — SODIUM CHLORIDE: 9 INJECTION, SOLUTION INTRAVENOUS at 15:15

## 2024-03-01 RX ADMIN — HYDROCORTISONE SODIUM SUCCINATE 100 MG: 100 INJECTION, POWDER, FOR SOLUTION INTRAMUSCULAR; INTRAVENOUS at 17:56

## 2024-03-01 RX ADMIN — IPRATROPIUM BROMIDE AND ALBUTEROL SULFATE 1 DOSE: 2.5; .5 SOLUTION RESPIRATORY (INHALATION) at 12:59

## 2024-03-01 RX ADMIN — MICONAZOLE NITRATE: 20.6 POWDER TOPICAL at 20:07

## 2024-03-01 RX ADMIN — MICONAZOLE NITRATE: 20.6 POWDER TOPICAL at 08:54

## 2024-03-01 RX ADMIN — PROPOFOL 30 MG: 10 INJECTION, EMULSION INTRAVENOUS at 15:32

## 2024-03-01 RX ADMIN — VECURONIUM BROMIDE 10 MG: 1 INJECTION, POWDER, LYOPHILIZED, FOR SOLUTION INTRAVENOUS at 15:32

## 2024-03-01 RX ADMIN — Medication 100 MCG/HR: at 06:14

## 2024-03-01 RX ADMIN — SODIUM PHOSPHATE, MONOBASIC, MONOHYDRATE AND SODIUM PHOSPHATE, DIBASIC, ANHYDROUS 15 MMOL: 142; 276 INJECTION, SOLUTION INTRAVENOUS at 06:52

## 2024-03-01 RX ADMIN — MEROPENEM 1000 MG: 1 INJECTION, POWDER, FOR SOLUTION INTRAVENOUS at 08:20

## 2024-03-01 RX ADMIN — VANCOMYCIN HYDROCHLORIDE 1500 MG: 10 INJECTION, POWDER, LYOPHILIZED, FOR SOLUTION INTRAVENOUS at 00:30

## 2024-03-01 RX ADMIN — CHLORHEXIDINE GLUCONATE 15 ML: 1.2 RINSE ORAL at 20:07

## 2024-03-01 RX ADMIN — LEVOTHYROXINE SODIUM 50 MCG: 0.03 TABLET ORAL at 06:01

## 2024-03-01 RX ADMIN — HYDROCORTISONE SODIUM SUCCINATE 100 MG: 100 INJECTION, POWDER, FOR SOLUTION INTRAMUSCULAR; INTRAVENOUS at 02:32

## 2024-03-01 RX ADMIN — SODIUM CHLORIDE, PRESERVATIVE FREE 20 MG: 5 INJECTION INTRAVENOUS at 08:20

## 2024-03-01 RX ADMIN — SODIUM CHLORIDE: 9 INJECTION, SOLUTION INTRAVENOUS at 17:11

## 2024-03-01 RX ADMIN — Medication 2 ML: at 20:24

## 2024-03-01 RX ADMIN — SODIUM CHLORIDE, PRESERVATIVE FREE 10 ML: 5 INJECTION INTRAVENOUS at 08:53

## 2024-03-01 RX ADMIN — MEROPENEM 1000 MG: 1 INJECTION, POWDER, FOR SOLUTION INTRAVENOUS at 00:33

## 2024-03-01 RX ADMIN — ALBUMIN (HUMAN) 25 G: 0.25 INJECTION, SOLUTION INTRAVENOUS at 02:32

## 2024-03-01 RX ADMIN — IPRATROPIUM BROMIDE AND ALBUTEROL SULFATE 1 DOSE: 2.5; .5 SOLUTION RESPIRATORY (INHALATION) at 20:24

## 2024-03-01 RX ADMIN — SODIUM CHLORIDE, PRESERVATIVE FREE 10 ML: 5 INJECTION INTRAVENOUS at 20:08

## 2024-03-01 RX ADMIN — CHLORHEXIDINE GLUCONATE 15 ML: 1.2 RINSE ORAL at 08:54

## 2024-03-01 RX ADMIN — HYDROCORTISONE SODIUM SUCCINATE 100 MG: 100 INJECTION, POWDER, FOR SOLUTION INTRAMUSCULAR; INTRAVENOUS at 10:51

## 2024-03-01 RX ADMIN — VANCOMYCIN HYDROCHLORIDE 1500 MG: 10 INJECTION, POWDER, LYOPHILIZED, FOR SOLUTION INTRAVENOUS at 12:30

## 2024-03-01 RX ADMIN — Medication 125 MCG/HR: at 18:30

## 2024-03-01 RX ADMIN — FENTANYL CITRATE 50 MCG: 50 INJECTION, SOLUTION INTRAMUSCULAR; INTRAVENOUS at 15:52

## 2024-03-01 RX ADMIN — Medication 2 ML: at 12:58

## 2024-03-01 RX ADMIN — Medication 4 MG/HR: at 01:02

## 2024-03-01 RX ADMIN — FENTANYL CITRATE 50 MCG: 50 INJECTION, SOLUTION INTRAMUSCULAR; INTRAVENOUS at 17:07

## 2024-03-01 RX ADMIN — MEROPENEM 1000 MG: 1 INJECTION, POWDER, FOR SOLUTION INTRAVENOUS at 17:55

## 2024-03-01 ASSESSMENT — PULMONARY FUNCTION TESTS
PIF_VALUE: 30
PIF_VALUE: 30
PIF_VALUE: 27
PIF_VALUE: 28

## 2024-03-01 ASSESSMENT — PAIN SCALES - GENERAL
PAINLEVEL_OUTOF10: 0

## 2024-03-01 NOTE — ANESTHESIA PRE PROCEDURE
Department of Anesthesiology  Preprocedure Note       Name:  Navin Tran   Age:  68 y.o.  :  1955                                          MRN:  [id number]         Date:  3/1/2024      Surgeon: Surgeon(s):  Katerina Wild MD    Procedure: Procedure(s):  BELOW KNEE AMPUTATION RIGHT LEG, POSSIBLE ABOVE KNEE    Medications prior to admission:   Prior to Admission medications    Medication Sig Start Date End Date Taking? Authorizing Provider   silver sulfADIAZINE (SILVADENE) 1 % cream Apply topically twice daily. 23   Saeed Bernabe Jr., APRN - CNP       Current medications:    Current Facility-Administered Medications   Medication Dose Route Frequency Provider Last Rate Last Admin   • ipratropium 0.5 mg-albuterol 2.5 mg (DUONEB) nebulizer solution 1 Dose  1 Dose Inhalation Q4H WA RT Geraldo Gonzalez MD   1 Dose at 24 1259   • sodium chloride (Inhalant) 3 % nebulizer solution 2 mL  2 mL Nebulization BID Geraldo Gonzalez MD   2 mL at 24 1258   • 0.9 % sodium chloride infusion   IntraVENous PRN Robert Goyal MD       • vitamin D (ERGOCALCIFEROL) capsule 50,000 Units  50,000 Units Oral Weekly Candy Curiel MD   50,000 Units at 24 0948   • hydrocortisone sodium succinate PF (SOLU-CORTEF) injection 100 mg  100 mg IntraVENous Q8H Geraldo Gonzalez MD   100 mg at 24 1051   • norepinephrine (LEVOPHED) 16 mg in sodium chloride 0.9 % 250 mL infusion  1-100 mcg/min IntraVENous Continuous Geraldo Gonzalez MD 1.9 mL/hr at 24 1230 2 mcg/min at 24 1230   • meropenem (MERREM) 1,000 mg in sodium chloride 0.9 % 100 mL IVPB ([id number])  1,000 mg IntraVENous q8h Robert Goyal MD   Stopped at 24 1220   • ondansetron (ZOFRAN-ODT) disintegrating tablet 4 mg  4 mg Oral Q8H PRN Robert Goyal MD        Or   • ondansetron (ZOFRAN) injection 4 mg  4 mg IntraVENous Q6H PRN Robert Goyal MD       • senna (SENOKOT) tablet 8.6 mg  1 tablet Oral Daily PRN Robert Goyal MD       • aluminum

## 2024-03-01 NOTE — PROGRESS NOTES
Pharmacy Consultation Note  (Antibiotic Dosing and Monitoring)    Initial consult date: 2/27/24  Consulting physician/provider: Dr. Chi  Drug: Vancomycin  Indication: Nec fasc of bilateral lower extremities    Age/  Gender Height Weight IBW  Allergy Information   68 y.o./male 180.3 cm (5' 11\") 104.3 kg (230 lb)     Ideal body weight: 75.3 kg (166 lb 0.1 oz)  Adjusted ideal body weight: 88.9 kg (196 lb 1.3 oz)   Metoprolol and Pcn [penicillins]      Renal Function:  Recent Labs     02/29/24  2301 03/01/24  0409 03/01/24  1256   BUN 16 17 19   CREATININE 0.5* 0.5* 0.5*         Intake/Output Summary (Last 24 hours) at 3/1/2024 1607  Last data filed at 3/1/2024 1535  Gross per 24 hour   Intake 4065.95 ml   Output 1235 ml   Net 2830.95 ml         Vancomycin Monitoring:  Trough:    Recent Labs     02/29/24  0058 02/29/24  2301   VANCOTROUGH 10.0 9.9       Random:  No results for input(s): \"VANCORANDOM\" in the last 72 hours.    Vancomycin Administration Times:  Recent vancomycin administrations                     vancomycin (VANCOCIN) 2,000 mg in sodium chloride 0.9 % 500 mL IVPB (mg) 2,000 mg New Bag 02/27/24 0158                    Assessment:  Patient is a 68 y.o. male who has been initiated on vancomycin  Estimated Creatinine Clearance: 183 mL/min (A) (based on SCr of 0.5 mg/dL (L)).  ID following  MSSA, pan sensitive enterococcus avium, group b strep    Plan:  Increase vancomycin to 2000 mg IV q12h    Isabela Martinez, LibertyD, BCPS, BCCCP 3/1/2024 4:07 PM

## 2024-03-01 NOTE — PLAN OF CARE
Problem: Safety - Medical Restraint  Goal: Remains free of injury from restraints (Restraint for Interference with Medical Device)  3/1/2024 0725 by Trish Santos RN  Outcome: Progressing  Flowsheets (Taken 2/29/2024 1800)  Remains free of injury from restraints (restraint for interference with medical device): Every 2 hours: Monitor safety, psychosocial status, comfort, nutrition and hydration

## 2024-03-01 NOTE — PROGRESS NOTES
Department of Podiatry  Progress Note    SUBJECTIVE:   Patient is seen at bedside for  Left lower extremity wound. Patient is currently intubated and in the ICU.  Patient underwent Guillotine BKA and left lower extremity wound debridement on 2/27/24. Vascular surgery is planning on taking patient back to the OR  3/1/24. No new pedal complaints today.     OBJECTIVE:    Scheduled Meds:   sodium phosphate IVPB (PERIPHERAL line)  15 mmol IntraVENous Once    ipratropium 0.5 mg-albuterol 2.5 mg  1 Dose Inhalation Q4H WA RT    vitamin D  50,000 Units Oral Weekly    hydrocortisone sodium succinate PF  100 mg IntraVENous Q8H    meropenem  1,000 mg IntraVENous q8h    vancomycin  1,500 mg IntraVENous Q12H    levothyroxine  50 mcg Oral Daily    sodium chloride flush  5-40 mL IntraVENous 2 times per day    miconazole   Topical BID    chlorhexidine  15 mL Mouth/Throat BID    famotidine (PEPCID) injection  20 mg IntraVENous BID     Continuous Infusions:   sodium chloride      sodium chloride 100 mL/hr at 03/01/24 0608    norepinephrine 2 mcg/min (03/01/24 0922)    fentaNYL 100 mcg/hr (03/01/24 0920)    midazolam 4 mg/hr (03/01/24 0921)     PRN Meds:.sodium chloride, ondansetron **OR** ondansetron, senna, aluminum & magnesium hydroxide-simethicone, acetaminophen **OR** acetaminophen, methyl salicylate, sodium chloride flush, [Held by provider] oxyCODONE **OR** [Held by provider] oxyCODONE, [Held by provider] HYDROmorphone, methocarbamol, hydrALAZINE    Allergies   Allergen Reactions    Metoprolol Other (See Comments)     hallucinations    Pcn [Penicillins]      Unknown reaction       /60   Pulse 55   Temp 97.2 °F (36.2 °C) (Temporal)   Resp 17   Ht 1.803 m (5' 11\")   Wt 109.4 kg (241 lb 3.2 oz)   SpO2 97%   BMI 33.64 kg/m²       EXAM:  LOWER EXTREMITY EXAMINATION     Previous examination. Dressing is CDI to the left lower extremity with foot in offloading boot.      VASCULAR:  DP and PT pulses are non-palpable.  Warm  Antibiotics as per ID: appreciate recommendations  - Vascular: underwent right guillotine BKA and left lower extremity debridement with Vascular surgery on 2/27/24. Patient to be taken back to the OR 3/1/24.   -Will continue to follow for left lower extremity wound.  - Will continue to follow patient while they are in-house.  - Patient discussed with Dr. Mi.     Noy Prabhakar Podiatry PGY2  3/1/2024   10:51 AM

## 2024-03-01 NOTE — PROGRESS NOTES
gallops.  Abdomen: Soft, non-tender, non-distended with normal bowel sounds.  Musculoskeletal: Right BKA and Left lower leg dressing.   Neurologic: Intubated and sedated.     Medications:  Reviewed    Infusion Medications    sodium chloride      norepinephrine Stopped (03/01/24 1030)    fentaNYL 100 mcg/hr (03/01/24 0920)    midazolam 4 mg/hr (03/01/24 0921)     Scheduled Medications    ipratropium 0.5 mg-albuterol 2.5 mg  1 Dose Inhalation Q4H WA RT    sodium chloride (Inhalant)  2 mL Nebulization BID    vitamin D  50,000 Units Oral Weekly    hydrocortisone sodium succinate PF  100 mg IntraVENous Q8H    meropenem  1,000 mg IntraVENous q8h    vancomycin  1,500 mg IntraVENous Q12H    levothyroxine  50 mcg Oral Daily    sodium chloride flush  5-40 mL IntraVENous 2 times per day    miconazole   Topical BID    chlorhexidine  15 mL Mouth/Throat BID    famotidine (PEPCID) injection  20 mg IntraVENous BID     PRN Meds: sodium chloride, ondansetron **OR** ondansetron, senna, aluminum & magnesium hydroxide-simethicone, acetaminophen **OR** acetaminophen, methyl salicylate, sodium chloride flush, [Held by provider] oxyCODONE **OR** [Held by provider] oxyCODONE, [Held by provider] HYDROmorphone, methocarbamol, hydrALAZINE    I/O    Intake/Output Summary (Last 24 hours) at 3/1/2024 1104  Last data filed at 3/1/2024 0800  Gross per 24 hour   Intake 4794.15 ml   Output 1135 ml   Net 3659.15 ml       Labs:   Recent Labs     02/28/24  0429 02/28/24  0751 02/29/24  0412 02/29/24  1748 03/01/24  0409   WBC 18.0*  --  8.9  --  6.2   HGB 6.9*   < > 7.2* 7.2* 7.1*   HCT 22.2*   < > 23.6* 23.4* 23.0*     --  348  --  337    < > = values in this interval not displayed.       Recent Labs     02/29/24  0406 02/29/24  0626 02/29/24  0915 02/29/24  1300 02/29/24  1400 02/29/24  2301 03/01/24  0409   *   < >  --    < > 132 135 136   K 4.6   < >  --    < > 4.6 4.7 4.5   CL 95*   < >  --    < > 100 101 103   CO2 24   < >  --    < >  25 26 26   BUN 16   < >  --    < > 16 16 17   CREATININE 0.6*   < >  --    < > 0.6* 0.5* 0.5*   CALCIUM 7.9*   < >  --    < > 7.9* 8.4* 8.1*   PHOS 1.7*  --  2.3*  --   --   --  2.4*    < > = values in this interval not displayed.       Recent Labs     02/27/24  1610 02/28/24  0429   PROT 5.7* 5.6*   ALKPHOS 77 66   ALT 11 10   AST 17 12   BILITOT 0.5 0.2       No results for input(s): \"INR\" in the last 72 hours.      Recent Labs     02/27/24  1426 02/27/24  1700   CKTOTAL 128 129       Chronic labs:  Lab Results   Component Value Date    CHOL 84 02/28/2024    TRIG 67 02/28/2024    HDL 26 (L) 02/28/2024    TSH 18.61 (H) 02/26/2024    INR 1.5 02/26/2024    LABA1C 6.0 (H) 02/28/2024         +++++++++++++++++++++++++++++++++++++++++++++++++  Karma Tilley DO   [facility].  +++++++++++++++++++++++++++++++++++++++++++++++++  NOTE: This report was transcribed using voice recognition software. Every effort was made to ensure accuracy; however, inadvertent computerized transcription errors may be present.

## 2024-03-01 NOTE — CARE COORDINATION
Care Coordination LOS 3 day; Remains intubated, Versed, Levophed, Merrem, Vanc. Plan for R BKA/AKA today per vascular with LLE wound debridement.  Pt is s/p R BK guillotine amp and LLE debridement 2/27/24.  Back door to [facility].    Electronically signed by Bonita Perez RN on 3/1/2024 at 3:37 PM

## 2024-03-01 NOTE — OP NOTE
Operative Note      Patient: Navin Tran  YOB: 1955  MRN: [id number]    Date of Procedure: 3/1/2024    Pre-Op Diagnosis Codes:     * Necrotizing fasciitis (HCC) [M72.6]    Post-Op Diagnosis: Same       Procedure  R BKA  L LE debridement, heel, calf  Left calf 15 cm x 15cm x 1 cm  Left heel 6 cm x 6cm x 2.5 cm    Surgeon(s):  Katerina Wild MD    Assistant:   Resident: Robert Goyal MD    Anesthesia: General    Estimated Blood Loss (mL): less than 100     Complications: None    Specimens:   ID Type Source Tests Collected by Time Destination   1 : RIGHT PARTIAL BKA Tissue Tissue MISCELLANEOUS SENDOUT Katerina Wild MD 3/1/2024 1649        Implants:  * No implants in log *      Drains:   NG/OG/NJ/NE Tube Center mouth (Active)   Surrounding Skin Clean, dry & intact 03/01/24 1500   Securement device Tape 03/01/24 1500   Status Clamped 03/01/24 1500   Placement Verified X-Ray (repeat) 03/01/24 1500   NG/OG/NJ/NE External Measurement (cm) 55 cm 03/01/24 1500   Drainage Appearance None 03/01/24 1500   Tube Feeding Standard with Fiber 02/29/24 2200   Tube feeding/verify rate (mL/hr) 0 mL/hr 03/01/24 0033   Tube Feeding Intake (mL) 0 ml 03/01/24 1400   Free Water/Flush (mL) 90 mL 03/01/24 0033   Residual Volume (ml) 0 ml 03/01/24 0033       Urinary Catheter 02/27/24 (Active)   Catheter Indications Need for fluid volume management of the critically ill patient in a critical care setting 03/01/24 1500   Site Assessment [place] 03/01/24 1500   Urine Color Yellow 03/01/24 1500   Urine Appearance Clear 03/01/24 1500   Collection Container Standard 03/01/24 1500   Securement Method Leg strap 03/01/24 1500   Catheter Care  Soap and water 03/01/24 1423   Catheter Best Practices  Drainage tube clipped to bed;Catheter secured to thigh;Tamper seal intact;Bag below bladder;Bag not on floor;Lack of dependent loop in tubing;Drainage bag less than half full 03/01/24 1500   Status Draining;Patent 03/01/24

## 2024-03-01 NOTE — FLOWSHEET NOTE
Patient returned from OR. Continues to pull at ETT tube when released for repositioning. 2 point soft bilateral restraints continued.

## 2024-03-01 NOTE — PROGRESS NOTES
ABDOMEN (KUB) (SINGLE AP VIEW)   Final Result   Findings for generalized colonic ileus or distal colonic obstruction.  Cannot   exclude a component fecal rectal retention.      Can consider correlation with CT scan abdomen pelvis.         XR ABDOMEN FOR NG/OG/NE TUBE PLACEMENT   Final Result   1. Enteric tube is positioned with the tip coiled in the gastric cardia.   Position is not significantly different when compared to previous exam.   2. Nonspecific bowel gas pattern.         CT FOOT LEFT WO CONTRAST   Final Result   1. Soft tissue ulceration and soft tissue gas involving the lateral aspect of   the heel pad with underlying bone destruction of the posterolateral calcaneus   compatible with osteomyelitis.   2. Soft tissue swelling medially and laterally at the ankle with soft tissue   ulceration anterolaterally. No underlying bone destruction of the tibia or   fibula.   3. No evidence of acute fracture or dislocation of the ankle or foot.         CT FOOT RIGHT WO CONTRAST   Final Result   1.  Bone destruction of the calcaneus compatible with osteomyelitis      2.  Extensive soft tissue ulceration      3.  Extensive subcutaneous and intra osseous gas within the foot compatible   with gas gangrene, no associated abscess.      4.  Extensive dorsal subcutaneous edema         CT ANKLE LEFT WO CONTRAST   Final Result   1. Soft tissue ulceration and soft tissue gas involving the lateral aspect of   the heel pad with underlying bone destruction of the posterolateral calcaneus   compatible with osteomyelitis.   2. Soft tissue swelling medially and laterally at the ankle with soft tissue   ulceration anterolaterally. No underlying bone destruction of the tibia or   fibula.   3. No evidence of acute fracture or dislocation of the ankle or foot.         CT ANKLE RIGHT WO CONTRAST   Final Result   1.  Bone destruction of the calcaneus compatible with osteomyelitis      2.  Extensive soft tissue ulceration      3.  Extensive  kept mechanically ventilated unit next surgery. To be taken back to OR on 3/1/24.   Patient off of levophed.  Continue clindamycin 900 mg IV every 8 hours, meropenem 1 g IV every 12 hours and vancomycin per infectious disease.  Monitor HR, concerns for Bradycardia.  Sodium stable this morning. Follow nephrology recommendations.  Started on stress dose steroids  25 g albumin q 8 h per nephrology for 6 doses.  Strict urine output monitoring.  Monitor Hgb, s/p 3 units rbc transfusion.  Fluid restriction, dry tray per nephrology.  Levothyroxine 50 mcg.  Miconazole powder for intertrigo of scrotum. Keep the area dry.     PT/OT evaluation: not indicated at this time   DVT prophylaxis: held  GI prophylaxis: PEPCID  Diet:   Diet NPO Exceptions are: Sips of Water with Meds  ADULT TUBE FEEDING; Orogastric; Immune Enhancing; Continuous; 20; Yes; 10; Q 4 hours; 55; 30; Q 4 hours; Protein; 1 Dose; Daily   Bowel regimen: Senakot,  Pain management: Oxycodone and dilauded  Code status: Full Code   Disposition: continue current care  Family: updated as available    Geraldo Gonzalez MD, PGY-2  Attending physician: Dr. Jerrod Bryant

## 2024-03-01 NOTE — ANESTHESIA POSTPROCEDURE EVALUATION
Department of Anesthesiology  Postprocedure Note    Patient: Navin Tran  MRN: [id number]  YOB: 1955  Date of evaluation: 3/1/2024    Procedure Summary       Date: 03/01/24 Room / Location: [address]    Anesthesia Start: 1515 Anesthesia Stop: 1741    Procedure: BELOW KNEE AMPUTATION RIGHT LEG, LEFT LEG HEEL AND CALF DEBRIDMENT (Bilateral: Leg Lower) Diagnosis:       Necrotizing fasciitis (HCC)      (Necrotizing fasciitis (HCC) [M72.6])    Surgeons: Katerina Wild MD Responsible Provider: Maximo Ye DO    Anesthesia Type: General ASA Status: 4            Anesthesia Type: General    Malika Phase I:      Malika Phase II:      Anesthesia Post Evaluation    Patient location during evaluation: ICU  Patient participation: complete - patient cannot participate  Level of consciousness: sedated and ventilated  Airway patency: patent  Nausea & Vomiting: no nausea and no vomiting  Cardiovascular status: blood pressure returned to baseline  Respiratory status: acceptable  Hydration status: euvolemic  Multimodal analgesia pain management approach    No notable events documented.

## 2024-03-01 NOTE — PROGRESS NOTES
50,000 units weekly  Replace phosphorus  Continue to monitor sodium levels          Candy Curiel MD

## 2024-03-01 NOTE — PROGRESS NOTES
Vascular Surgery Progress Note    Pt is being seen in f/u today regarding R LE necrotizing fascitis, B LE tissue loss    Subjective  Pt s/e.  Intubated and sedated.  Awakens to voice and follows commands.  Plan for OR today.  Discussed with family at the bedside.     Current Medications:    sodium chloride      sodium chloride 100 mL/hr at 03/01/24 0608    norepinephrine 2 mcg/min (03/01/24 0922)    fentaNYL 100 mcg/hr (03/01/24 0920)    midazolam 4 mg/hr (03/01/24 0921)      sodium chloride, ondansetron **OR** ondansetron, senna, aluminum & magnesium hydroxide-simethicone, acetaminophen **OR** acetaminophen, methyl salicylate, sodium chloride flush, [Held by provider] oxyCODONE **OR** [Held by provider] oxyCODONE, [Held by provider] HYDROmorphone, methocarbamol, hydrALAZINE    sodium phosphate IVPB (PERIPHERAL line)  15 mmol IntraVENous Once    vitamin D  50,000 Units Oral Weekly    hydrocortisone sodium succinate PF  100 mg IntraVENous Q8H    meropenem  1,000 mg IntraVENous q8h    vancomycin  1,500 mg IntraVENous Q12H    levothyroxine  50 mcg Oral Daily    sodium chloride flush  5-40 mL IntraVENous 2 times per day    miconazole   Topical BID    chlorhexidine  15 mL Mouth/Throat BID    famotidine (PEPCID) injection  20 mg IntraVENous BID      PHYSICAL EXAM:    /60   Pulse 55   Temp 97.2 °F (36.2 °C) (Temporal)   Resp 17   Ht 1.803 m (5' 11\")   Wt 109.4 kg (241 lb 3.2 oz)   SpO2 97%   BMI 33.64 kg/m²     Intake/Output Summary (Last 24 hours) at 3/1/2024 0931  Last data filed at 3/1/2024 0800  Gross per 24 hour   Intake 4794.15 ml   Output 1235 ml   Net 3559.15 ml        Gen sedated  CVS S1S2   Resp on mechanical ventilation  Abd Soft, non-tender, non-distended   R LE Guillotine amp with dressing in place  L LE Leg wrapped    LABS:    Lab Results   Component Value Date    WBC 6.2 03/01/2024    HGB 7.1 (L) 03/01/2024    HCT 23.0 (L) 03/01/2024     03/01/2024    PROTIME 15.9 (H) 02/26/2024    INR  1.5 02/26/2024    K 4.5 03/01/2024    BUN 17 03/01/2024    CREATININE 0.5 (L) 03/01/2024     A/P R LE necrotizing fascitits, L LE wounds  S/p R BK guillotine amp, L LE debridement 2/27  Septic shock  Medical management per critical care  Pressors restarted this morning  On mechanical ventilation  Monitor H/H: Hgb 7.1, s/p one unit pRBC 2/28  pRBC on hold for OR  Abx per ID: meropenem, vanco  Daily dressing changes  Hyponatremia- nephrology following, now resolved  Plan for right BKA/AKA today, L LE wound debridement  Pt is at high risk of left lower extremity limb loss given extent of the wound  Discussed with family at the bedside     Jesús Hackett, DORIAN - CNP

## 2024-03-02 ENCOUNTER — APPOINTMENT (OUTPATIENT)
Dept: GENERAL RADIOLOGY | Age: 69
DRG: 474 | End: 2024-03-02
Payer: MEDICARE

## 2024-03-02 LAB
AADO2: 148.8 MMHG
ABO/RH: NORMAL
ALBUMIN SERPL-MCNC: 3 G/DL (ref 3.5–5.2)
ALP SERPL-CCNC: 84 U/L (ref 40–129)
ALT SERPL-CCNC: 38 U/L (ref 0–40)
ANION GAP SERPL CALCULATED.3IONS-SCNC: 6 MMOL/L (ref 7–16)
ANION GAP SERPL CALCULATED.3IONS-SCNC: 7 MMOL/L (ref 7–16)
ANION GAP SERPL CALCULATED.3IONS-SCNC: 8 MMOL/L (ref 7–16)
ANTIBODY SCREEN: NEGATIVE
ARM BAND NUMBER: NORMAL
AST SERPL-CCNC: 55 U/L (ref 0–39)
B.E.: 3.7 MMOL/L (ref -3–3)
BASOPHILS # BLD: 0.01 K/UL (ref 0–0.2)
BASOPHILS NFR BLD: 0 % (ref 0–2)
BILIRUB DIRECT SERPL-MCNC: <0.2 MG/DL (ref 0–0.3)
BILIRUB INDIRECT SERPL-MCNC: ABNORMAL MG/DL (ref 0–1)
BILIRUB SERPL-MCNC: 0.3 MG/DL (ref 0–1.2)
BLOOD BANK BLOOD PRODUCT EXPIRATION DATE: NORMAL
BLOOD BANK DISPENSE STATUS: NORMAL
BLOOD BANK ISBT PRODUCT BLOOD TYPE: 8400
BLOOD BANK PRODUCT CODE: NORMAL
BLOOD BANK SAMPLE EXPIRATION: NORMAL
BLOOD BANK UNIT TYPE AND RH: NORMAL
BPU ID: NORMAL
BUN SERPL-MCNC: 23 MG/DL (ref 6–23)
BUN SERPL-MCNC: 25 MG/DL (ref 6–23)
BUN SERPL-MCNC: 26 MG/DL (ref 6–23)
CALCIUM SERPL-MCNC: 8.1 MG/DL (ref 8.6–10.2)
CALCIUM SERPL-MCNC: 8.2 MG/DL (ref 8.6–10.2)
CALCIUM SERPL-MCNC: 8.3 MG/DL (ref 8.6–10.2)
CHLORIDE SERPL-SCNC: 102 MMOL/L (ref 98–107)
CHLORIDE SERPL-SCNC: 103 MMOL/L (ref 98–107)
CHLORIDE SERPL-SCNC: 105 MMOL/L (ref 98–107)
CO2 SERPL-SCNC: 27 MMOL/L (ref 22–29)
CO2 SERPL-SCNC: 29 MMOL/L (ref 22–29)
CO2 SERPL-SCNC: 30 MMOL/L (ref 22–29)
COHB: 1.1 % (ref 0–1.5)
COMPONENT: NORMAL
CREAT SERPL-MCNC: 0.6 MG/DL (ref 0.7–1.2)
CRITICAL: ABNORMAL
CROSSMATCH RESULT: NORMAL
DATE ANALYZED: ABNORMAL
DATE OF COLLECTION: ABNORMAL
EOSINOPHIL # BLD: 0 K/UL (ref 0.05–0.5)
EOSINOPHILS RELATIVE PERCENT: 0 % (ref 0–6)
ERYTHROCYTE [DISTWIDTH] IN BLOOD BY AUTOMATED COUNT: 19.9 % (ref 11.5–15)
FIO2: 40 %
GFR SERPL CREATININE-BSD FRML MDRD: >60 ML/MIN/1.73M2
GLUCOSE SERPL-MCNC: 133 MG/DL (ref 74–99)
GLUCOSE SERPL-MCNC: 133 MG/DL (ref 74–99)
GLUCOSE SERPL-MCNC: 163 MG/DL (ref 74–99)
HCO3: 28.4 MMOL/L (ref 22–26)
HCT VFR BLD AUTO: 26.2 % (ref 37–54)
HGB BLD-MCNC: 8.2 G/DL (ref 12.5–16.5)
HHB: 5.4 % (ref 0–5)
IMM GRANULOCYTES # BLD AUTO: 0.14 K/UL (ref 0–0.58)
IMM GRANULOCYTES NFR BLD: 2 % (ref 0–5)
LAB: ABNORMAL
LYMPHOCYTES NFR BLD: 1.78 K/UL (ref 1.5–4)
LYMPHOCYTES RELATIVE PERCENT: 29 % (ref 20–42)
Lab: 505
MAGNESIUM SERPL-MCNC: 2.3 MG/DL (ref 1.6–2.6)
MCH RBC QN AUTO: 25.5 PG (ref 26–35)
MCHC RBC AUTO-ENTMCNC: 31.3 G/DL (ref 32–34.5)
MCV RBC AUTO: 81.6 FL (ref 80–99.9)
METHB: 0.3 % (ref 0–1.5)
MICROORGANISM SPEC CULT: ABNORMAL
MICROORGANISM/AGENT SPEC: ABNORMAL
MODE: AC
MONOCYTES NFR BLD: 0.73 K/UL (ref 0.1–0.95)
MONOCYTES NFR BLD: 12 % (ref 2–12)
NEUTROPHILS NFR BLD: 57 % (ref 43–80)
NEUTS SEG NFR BLD: 3.57 K/UL (ref 1.8–7.3)
O2 CONTENT: 11.8 ML/DL
O2 SATURATION: 94.5 % (ref 92–98.5)
O2HB: 93.2 % (ref 94–97)
OPERATOR ID: 2593
PATIENT TEMP: 37 C
PCO2: 43.4 MMHG (ref 35–45)
PEEP/CPAP: 8 CMH2O
PFO2: 1.91 MMHG/%
PH BLOOD GAS: 7.43 (ref 7.35–7.45)
PHOSPHATE SERPL-MCNC: 2.7 MG/DL (ref 2.5–4.5)
PLATELET # BLD AUTO: 320 K/UL (ref 130–450)
PMV BLD AUTO: 8.5 FL (ref 7–12)
PO2: 76.5 MMHG (ref 75–100)
POTASSIUM SERPL-SCNC: 4.1 MMOL/L (ref 3.5–5)
POTASSIUM SERPL-SCNC: 4.3 MMOL/L (ref 3.5–5)
POTASSIUM SERPL-SCNC: 4.5 MMOL/L (ref 3.5–5)
PROT SERPL-MCNC: 5.6 G/DL (ref 6.4–8.3)
RBC # BLD AUTO: 3.21 M/UL (ref 3.8–5.8)
RI(T): 1.95
RR MECHANICAL: 16 B/MIN
SERVICE CMNT-IMP: ABNORMAL
SERVICE CMNT-IMP: ABNORMAL
SODIUM SERPL-SCNC: 135 MMOL/L (ref 132–146)
SODIUM SERPL-SCNC: 140 MMOL/L (ref 132–146)
SODIUM SERPL-SCNC: 142 MMOL/L (ref 132–146)
SOURCE, BLOOD GAS: ABNORMAL
SPECIMEN DESCRIPTION: ABNORMAL
SPECIMEN DESCRIPTION: ABNORMAL
THB: 8.9 G/DL (ref 11.5–16.5)
TIME ANALYZED: 514
TRANSFUSION STATUS: NORMAL
UNIT DIVISION: 0
UNIT ISSUE DATE/TIME: NORMAL
VT MECHANICAL: 500 ML
WBC OTHER # BLD: 6.2 K/UL (ref 4.5–11.5)

## 2024-03-02 PROCEDURE — 2580000003 HC RX 258: Performed by: STUDENT IN AN ORGANIZED HEALTH CARE EDUCATION/TRAINING PROGRAM

## 2024-03-02 PROCEDURE — 6370000000 HC RX 637 (ALT 250 FOR IP): Performed by: STUDENT IN AN ORGANIZED HEALTH CARE EDUCATION/TRAINING PROGRAM

## 2024-03-02 PROCEDURE — A4216 STERILE WATER/SALINE, 10 ML: HCPCS

## 2024-03-02 PROCEDURE — 80053 COMPREHEN METABOLIC PANEL: CPT

## 2024-03-02 PROCEDURE — 71045 X-RAY EXAM CHEST 1 VIEW: CPT

## 2024-03-02 PROCEDURE — 84100 ASSAY OF PHOSPHORUS: CPT

## 2024-03-02 PROCEDURE — 2580000003 HC RX 258

## 2024-03-02 PROCEDURE — 02HV33Z INSERTION OF INFUSION DEVICE INTO SUPERIOR VENA CAVA, PERCUTANEOUS APPROACH: ICD-10-PCS | Performed by: STUDENT IN AN ORGANIZED HEALTH CARE EDUCATION/TRAINING PROGRAM

## 2024-03-02 PROCEDURE — 82248 BILIRUBIN DIRECT: CPT

## 2024-03-02 PROCEDURE — 80048 BASIC METABOLIC PNL TOTAL CA: CPT

## 2024-03-02 PROCEDURE — 2500000003 HC RX 250 WO HCPCS: Performed by: STUDENT IN AN ORGANIZED HEALTH CARE EDUCATION/TRAINING PROGRAM

## 2024-03-02 PROCEDURE — 94640 AIRWAY INHALATION TREATMENT: CPT

## 2024-03-02 PROCEDURE — C1751 CATH, INF, PER/CENT/MIDLINE: HCPCS

## 2024-03-02 PROCEDURE — 99232 SBSQ HOSP IP/OBS MODERATE 35: CPT | Performed by: INTERNAL MEDICINE

## 2024-03-02 PROCEDURE — 85025 COMPLETE CBC W/AUTO DIFF WBC: CPT

## 2024-03-02 PROCEDURE — 6360000002 HC RX W HCPCS: Performed by: STUDENT IN AN ORGANIZED HEALTH CARE EDUCATION/TRAINING PROGRAM

## 2024-03-02 PROCEDURE — 36569 INSJ PICC 5 YR+ W/O IMAGING: CPT

## 2024-03-02 PROCEDURE — 82805 BLOOD GASES W/O2 SATURATION: CPT

## 2024-03-02 PROCEDURE — 76937 US GUIDE VASCULAR ACCESS: CPT

## 2024-03-02 PROCEDURE — 83735 ASSAY OF MAGNESIUM: CPT

## 2024-03-02 PROCEDURE — A4216 STERILE WATER/SALINE, 10 ML: HCPCS | Performed by: STUDENT IN AN ORGANIZED HEALTH CARE EDUCATION/TRAINING PROGRAM

## 2024-03-02 PROCEDURE — 2000000000 HC ICU R&B

## 2024-03-02 PROCEDURE — 99291 CRITICAL CARE FIRST HOUR: CPT | Performed by: INTERNAL MEDICINE

## 2024-03-02 PROCEDURE — 94003 VENT MGMT INPAT SUBQ DAY: CPT

## 2024-03-02 PROCEDURE — 6360000002 HC RX W HCPCS: Performed by: SURGERY

## 2024-03-02 PROCEDURE — 6360000002 HC RX W HCPCS

## 2024-03-02 RX ORDER — FUROSEMIDE 10 MG/ML
40 INJECTION INTRAMUSCULAR; INTRAVENOUS ONCE
Status: COMPLETED | OUTPATIENT
Start: 2024-03-02 | End: 2024-03-02

## 2024-03-02 RX ORDER — HEPARIN 100 UNIT/ML
3 SYRINGE INTRAVENOUS PRN
Status: DISCONTINUED | OUTPATIENT
Start: 2024-03-02 | End: 2024-03-04 | Stop reason: HOSPADM

## 2024-03-02 RX ORDER — SODIUM CHLORIDE 0.9 % (FLUSH) 0.9 %
5-40 SYRINGE (ML) INJECTION PRN
Status: DISCONTINUED | OUTPATIENT
Start: 2024-03-02 | End: 2024-03-04

## 2024-03-02 RX ORDER — HEPARIN 100 UNIT/ML
3 SYRINGE INTRAVENOUS EVERY 12 HOURS SCHEDULED
Status: DISCONTINUED | OUTPATIENT
Start: 2024-03-02 | End: 2024-03-04 | Stop reason: HOSPADM

## 2024-03-02 RX ORDER — HEPARIN SODIUM 10000 [USP'U]/ML
5000 INJECTION, SOLUTION INTRAVENOUS; SUBCUTANEOUS EVERY 8 HOURS
Status: DISCONTINUED | OUTPATIENT
Start: 2024-03-02 | End: 2024-03-04 | Stop reason: HOSPADM

## 2024-03-02 RX ORDER — LIDOCAINE HYDROCHLORIDE 10 MG/ML
5 INJECTION, SOLUTION EPIDURAL; INFILTRATION; INTRACAUDAL; PERINEURAL ONCE
Status: DISCONTINUED | OUTPATIENT
Start: 2024-03-02 | End: 2024-03-04

## 2024-03-02 RX ORDER — SODIUM CHLORIDE 0.9 % (FLUSH) 0.9 %
5-40 SYRINGE (ML) INJECTION EVERY 12 HOURS SCHEDULED
Status: DISCONTINUED | OUTPATIENT
Start: 2024-03-02 | End: 2024-03-04 | Stop reason: HOSPADM

## 2024-03-02 RX ORDER — SODIUM CHLORIDE 9 MG/ML
INJECTION, SOLUTION INTRAVENOUS PRN
Status: DISCONTINUED | OUTPATIENT
Start: 2024-03-02 | End: 2024-03-04 | Stop reason: HOSPADM

## 2024-03-02 RX ADMIN — IPRATROPIUM BROMIDE AND ALBUTEROL SULFATE 1 DOSE: 2.5; .5 SOLUTION RESPIRATORY (INHALATION) at 20:33

## 2024-03-02 RX ADMIN — VANCOMYCIN HYDROCHLORIDE 2000 MG: 10 INJECTION, POWDER, LYOPHILIZED, FOR SOLUTION INTRAVENOUS at 12:06

## 2024-03-02 RX ADMIN — HEPARIN SODIUM 5000 UNITS: 10000 INJECTION INTRAVENOUS; SUBCUTANEOUS at 20:53

## 2024-03-02 RX ADMIN — FUROSEMIDE 40 MG: 10 INJECTION, SOLUTION INTRAMUSCULAR; INTRAVENOUS at 10:38

## 2024-03-02 RX ADMIN — MEROPENEM 1000 MG: 1 INJECTION, POWDER, FOR SOLUTION INTRAVENOUS at 07:43

## 2024-03-02 RX ADMIN — CHLORHEXIDINE GLUCONATE 15 ML: 1.2 RINSE ORAL at 07:40

## 2024-03-02 RX ADMIN — SODIUM CHLORIDE 10 ML/HR: 9 INJECTION, SOLUTION INTRAVENOUS at 12:36

## 2024-03-02 RX ADMIN — MICONAZOLE NITRATE: 20.6 POWDER TOPICAL at 20:52

## 2024-03-02 RX ADMIN — MEROPENEM 1000 MG: 1 INJECTION, POWDER, FOR SOLUTION INTRAVENOUS at 15:31

## 2024-03-02 RX ADMIN — HYDROCORTISONE SODIUM SUCCINATE 100 MG: 100 INJECTION, POWDER, FOR SOLUTION INTRAMUSCULAR; INTRAVENOUS at 10:38

## 2024-03-02 RX ADMIN — MEROPENEM 1000 MG: 1 INJECTION, POWDER, FOR SOLUTION INTRAVENOUS at 00:04

## 2024-03-02 RX ADMIN — VANCOMYCIN HYDROCHLORIDE 2000 MG: 10 INJECTION, POWDER, LYOPHILIZED, FOR SOLUTION INTRAVENOUS at 00:15

## 2024-03-02 RX ADMIN — SODIUM CHLORIDE, PRESERVATIVE FREE 10 ML: 5 INJECTION INTRAVENOUS at 20:53

## 2024-03-02 RX ADMIN — SODIUM CHLORIDE, PRESERVATIVE FREE 20 MG: 5 INJECTION INTRAVENOUS at 20:52

## 2024-03-02 RX ADMIN — LEVOTHYROXINE SODIUM 50 MCG: 0.03 TABLET ORAL at 06:05

## 2024-03-02 RX ADMIN — IPRATROPIUM BROMIDE AND ALBUTEROL SULFATE 1 DOSE: 2.5; .5 SOLUTION RESPIRATORY (INHALATION) at 08:08

## 2024-03-02 RX ADMIN — IPRATROPIUM BROMIDE AND ALBUTEROL SULFATE 1 DOSE: 2.5; .5 SOLUTION RESPIRATORY (INHALATION) at 15:52

## 2024-03-02 RX ADMIN — MEROPENEM 1000 MG: 1 INJECTION, POWDER, FOR SOLUTION INTRAVENOUS at 23:53

## 2024-03-02 RX ADMIN — HEPARIN SODIUM 5000 UNITS: 10000 INJECTION INTRAVENOUS; SUBCUTANEOUS at 13:27

## 2024-03-02 RX ADMIN — Medication 4 MG/HR: at 05:10

## 2024-03-02 RX ADMIN — Medication 2 ML: at 20:33

## 2024-03-02 RX ADMIN — IPRATROPIUM BROMIDE AND ALBUTEROL SULFATE 1 DOSE: 2.5; .5 SOLUTION RESPIRATORY (INHALATION) at 11:06

## 2024-03-02 RX ADMIN — HYDROMORPHONE HYDROCHLORIDE 0.5 MG: 1 INJECTION, SOLUTION INTRAMUSCULAR; INTRAVENOUS; SUBCUTANEOUS at 08:57

## 2024-03-02 RX ADMIN — Medication 100 MCG/HR: at 15:34

## 2024-03-02 RX ADMIN — HYDROCORTISONE SODIUM SUCCINATE 100 MG: 100 INJECTION, POWDER, FOR SOLUTION INTRAMUSCULAR; INTRAVENOUS at 02:17

## 2024-03-02 RX ADMIN — HEPARIN 300 UNITS: 100 SYRINGE at 20:53

## 2024-03-02 RX ADMIN — HYDROCORTISONE SODIUM SUCCINATE 100 MG: 100 INJECTION, POWDER, FOR SOLUTION INTRAMUSCULAR; INTRAVENOUS at 18:58

## 2024-03-02 RX ADMIN — SODIUM CHLORIDE, PRESERVATIVE FREE 10 ML: 5 INJECTION INTRAVENOUS at 20:52

## 2024-03-02 RX ADMIN — SODIUM CHLORIDE, PRESERVATIVE FREE 20 MG: 5 INJECTION INTRAVENOUS at 07:40

## 2024-03-02 RX ADMIN — HYDROMORPHONE HYDROCHLORIDE 0.5 MG: 1 INJECTION, SOLUTION INTRAMUSCULAR; INTRAVENOUS; SUBCUTANEOUS at 11:31

## 2024-03-02 RX ADMIN — Medication 5 MCG/MIN: at 06:33

## 2024-03-02 RX ADMIN — CHLORHEXIDINE GLUCONATE 15 ML: 1.2 RINSE ORAL at 20:52

## 2024-03-02 RX ADMIN — VANCOMYCIN HYDROCHLORIDE 2000 MG: 10 INJECTION, POWDER, LYOPHILIZED, FOR SOLUTION INTRAVENOUS at 23:59

## 2024-03-02 RX ADMIN — MICONAZOLE NITRATE: 20.6 POWDER TOPICAL at 07:41

## 2024-03-02 RX ADMIN — Medication 125 MCG/HR: at 03:57

## 2024-03-02 RX ADMIN — Medication 2 ML: at 08:08

## 2024-03-02 ASSESSMENT — PAIN DESCRIPTION - ORIENTATION
ORIENTATION: RIGHT
ORIENTATION: RIGHT;LEFT
ORIENTATION_2: LEFT
ORIENTATION: RIGHT;LEFT

## 2024-03-02 ASSESSMENT — PAIN DESCRIPTION - PAIN TYPE
TYPE: ACUTE PAIN
TYPE: ACUTE PAIN

## 2024-03-02 ASSESSMENT — PULMONARY FUNCTION TESTS
PIF_VALUE: 28
PIF_VALUE: 28
PIF_VALUE: 15

## 2024-03-02 ASSESSMENT — PAIN DESCRIPTION - DESCRIPTORS
DESCRIPTORS_2: DISCOMFORT
DESCRIPTORS: ACHING;DISCOMFORT

## 2024-03-02 ASSESSMENT — PAIN DESCRIPTION - LOCATION
LOCATION: LEG
LOCATION_2: LEG

## 2024-03-02 ASSESSMENT — PAIN DESCRIPTION - INTENSITY: RATING_2: 6

## 2024-03-02 ASSESSMENT — PAIN DESCRIPTION - ONSET
ONSET: GRADUAL
ONSET: GRADUAL

## 2024-03-02 ASSESSMENT — PAIN - FUNCTIONAL ASSESSMENT
PAIN_FUNCTIONAL_ASSESSMENT: PREVENTS OR INTERFERES WITH MANY ACTIVE NOT PASSIVE ACTIVITIES
PAIN_FUNCTIONAL_ASSESSMENT_SITE2: PREVENTS OR INTERFERES SOME ACTIVE ACTIVITIES AND ADLS
PAIN_FUNCTIONAL_ASSESSMENT: PREVENTS OR INTERFERES WITH MANY ACTIVE NOT PASSIVE ACTIVITIES

## 2024-03-02 ASSESSMENT — PAIN SCALES - GENERAL
PAINLEVEL_OUTOF10: 2
PAINLEVEL_OUTOF10: 0
PAINLEVEL_OUTOF10: 1
PAINLEVEL_OUTOF10: 0
PAINLEVEL_OUTOF10: 0
PAINLEVEL_OUTOF10: 10
PAINLEVEL_OUTOF10: 5

## 2024-03-02 ASSESSMENT — PAIN DESCRIPTION - FREQUENCY
FREQUENCY: INTERMITTENT
FREQUENCY: INTERMITTENT

## 2024-03-02 NOTE — PROGRESS NOTES
[facility]  Department of Internal Medicine   Internal Medicine Residency   MICU Progress Note    Patient:  Navin Tran 68 y.o. male  MRN: [id number]     Date of Service: 3/2/2024    Allergy: Metoprolol and Pcn [penicillins]    Subjective     Patient seen and examined at bedside. Sedated and intubated. Discussed plan with family at bedside.    24h change: Returned from OR, no acute events overnight     Objective     VS: /79   Pulse 63   Temp 98.9 °F (37.2 °C) (Temporal)   Resp 16   Ht 1.803 m (5' 11\")   Wt 109.4 kg (241 lb 3.2 oz)   SpO2 92%   BMI 33.64 kg/m²   ABP (Arterial line BP): 144/60  ABP Mean (Arterial Line Mean): 88 mmHg    I & O - 24hr:   Intake/Output Summary (Last 24 hours) at 3/2/2024 0730  Last data filed at 3/2/2024 0653  Gross per 24 hour   Intake 2827.16 ml   Output 1395 ml   Net 1432.16 ml       Physical Exam:  General Appearance: Sedated, intubated   Neck: no carotid bruit  Lung: clear to auscultation bilaterally  Heart: hema cardia, regular rhythm; no added heart sounds   Abdomen: soft, non-tender; bowel sounds normal; no masses,  no organomegaly  Extremities:  Right LE BKA, Left LE heal wound s/p debridement; RLE wound vac   Neurologic: Mental status: Sedated, follows verbal commands    Lines     site day    Art line   R Radial    TLC None    PICC None    Hemoaccess None      Mechanical Ventilation:  Mode: A/C   TV:500 ml RR: 16    PEEP 8cmH2O FiO2 100    ABG:     Lab Results   Component Value Date/Time    PH 7.433 03/02/2024 05:05 AM    PCO2 43.4 03/02/2024 05:05 AM    PO2 76.5 03/02/2024 05:05 AM    HCO3 28.4 03/02/2024 05:05 AM    BE 3.7 03/02/2024 05:05 AM    THB 8.9 03/02/2024 05:05 AM    O2SAT 94.5 03/02/2024 05:05 AM        Medications     Infusions: (Fluid, Sedation, Vasopressors)    Vasopressors  Levophed  Sedation  Fentanyl  Versed    Nutrition:   TF  ATB:   Antibiotics  Days   Vancomycin    Merrem           Labs   CBC:   Lab Results   Component Value  tolerated; daily ABG and CXR  Continue steroids, nephrology following   Obtain LFT   Continue Albumin   Monitor Hgb, s/p 4 units rbc transfusion.  Continue tube feeds   Levothyroxine 50 mcg.  Miconazole powder for intertrigo of scrotum. Keep the area dry.     PT/OT evaluation: not indicated at this time   DVT prophylaxis: held, reassess today   GI prophylaxis: PEPCID  Diet:   Diet NPO Exceptions are: Sips of Water with Meds  ADULT TUBE FEEDING; Orogastric; Immune Enhancing; Continuous; 20; Yes; 10; Q 4 hours; 55; 30; Q 4 hours; Protein; 1 Dose; Daily   Bowel regimen: Senakot,  Pain management: Currently on fentanyl drip   Code status: Full Code   Disposition: continue current care  Family: updated as available    Rachel Allison MD, PGY-2  Attending physician: Dr. Bryant

## 2024-03-02 NOTE — PROGRESS NOTES
DAILY VENTILATOR WEANING ASSESSMENT PERFORMED    P/FIO2 Ratio = 268         (<100= do not Wean)                  Cs =42                       (<32= Instability)  Plat. Pressure =19   MV =7.98  RSBI =    Instabilities:       Cardiovascular =       CNS =       Respiratory =       Metabolic =    Parameters    no    Wean per protocol  no    Ask Physician for a weaning plan yes    Additional Comments: going back to surgery to day no weaning    Performed by Marianne Yuen RCP RRT      Reference Table:    Cardiovascular     CNS      1. Mean BP less than or equal to 75   1. Neuromuscular blockade  2. Heart Rate greater than 130   2. RASS of -3, -4, -5  3. Myocardial Ischemia    3. RASS of +3, +4  4. Mechanical Assist Device    4. ICP greater than 15 or             Intracranial Hypertension         Respiratory      Metabolic  1. PEEP equal to or greater than 10cm/H20  1.Temp. (8hrs) less than 95 or > 103  2. Respiratory Rate greater than 35   2. WBC < 5000 or > 23563  3. Minute Volume greater than 15L  4. pH less than 7.30  5. Deteriorating chest X-ray         03/01/24 0827   Patient Observation   Pulse 55   Respirations 17   SpO2 97 %   Vent Information   Ventilator Day(s) 3   Ventilator -25   Vent Mode AC/VC   Ventilator Settings   FiO2  40 %   Vt (Set, mL) 500 mL   Resp Rate (Set) 16 bpm   PEEP/CPAP (cmH2O) 8   Peak Inspiratory Flow (Set) 60 L/sec   Vent Patient Data (Readings)   Vt (Measured) 494 mL   Peak Inspiratory Pressure (cmH2O) 27 cmH2O   Rate Measured 16 br/min   Minute Volume (L/min) 7.98 Liters   Peak Inspiratory Flow (lpm) 60 L/sec   Mean Airway Pressure (cmH2O) 13 cmH20   Plateau Pressure (cm H2O) 19 cm H2O   Driving Pressure 11   I:E Ratio 1:3.2   Flow Sensitivity 3 L/min   PEEP Intrinsic (cm H2O) 0.3 cm H2O   Static Compliance (L/cm H2O) 42   Backup Apnea On   Backup Rate 16 Breaths Per Minute   Backup Vt 50   Vent Alarm Settings   High Pressure (cmH2O) 50 cmH2O   Low Minute Volume (lpm) 6.5 L/min

## 2024-03-02 NOTE — PROGRESS NOTES
Pharmacy Consultation Note  (Antibiotic Dosing and Monitoring)    Initial consult date: 2/27/24  Consulting physician/provider: Dr. Chi  Drug: Vancomycin  Indication: Nec fasc of bilateral lower extremities    Age/  Gender Height Weight IBW  Allergy Information   68 y.o./male 180.3 cm (5' 11\") 104.3 kg (230 lb)     Ideal body weight: 75.3 kg (166 lb 0.1 oz)  Adjusted ideal body weight: 88.9 kg (196 lb 1.3 oz)   Metoprolol and Pcn [penicillins]      Renal Function:  Recent Labs     03/01/24  1729 03/01/24  2142 03/02/24  0458   BUN 18 20 23   CREATININE 0.6* 0.5* 0.6*         Intake/Output Summary (Last 24 hours) at 3/2/2024 0844  Last data filed at 3/2/2024 0653  Gross per 24 hour   Intake 3177.16 ml   Output 1345 ml   Net 1832.16 ml         Vancomycin Monitoring:  Trough:    Recent Labs     02/29/24  0058 02/29/24  2301   VANCOTROUGH 10.0 9.9       Random:  No results for input(s): \"VANCORANDOM\" in the last 72 hours.    Vancomycin Administration Times:  Recent vancomycin administrations                     vancomycin (VANCOCIN) 2,000 mg in sodium chloride 0.9 % 500 mL IVPB (mg) 2,000 mg New Bag 02/27/24 0158                    Assessment:  Patient is a 68 y.o. male who has been initiated on vancomycin  Estimated Creatinine Clearance: 152 mL/min (A) (based on SCr of 0.6 mg/dL (L)).  ID following  MSSA, pan sensitive enterococcus avium, group b strep    Plan:  Vancomycin to 2000 mg IV q12h    Isabela Martinez, LibertyD, BCPS, BCCCP 3/2/2024 8:44 AM
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
## 2024-03-02 NOTE — PROGRESS NOTES
Chg double lumen picc Placement 3/2/2024    Product number: [id number]   Lot Number: [id number]      Ultrasound: yes   Right Basilic vein:                Upper Arm Circumference: (CM) 36cm    Size:(FR)/GUAGE 5.5fr/38cm    Exposed Length: (CM) 3cm    Internal Length: (CM) 35cm   Cut: (CM) 17cm   Vein Measurement: 0.58cm              Lidocaine Given: yes  Mary Lou Patton RN  3/2/2024  12:18 PM      jama

## 2024-03-02 NOTE — PLAN OF CARE
Problem: Respiratory - Adult  Goal: Achieves optimal ventilation and oxygenation  Outcome: Not Progressing   No weaning today pt going back to surgery

## 2024-03-02 NOTE — PROGRESS NOTES
Hospitalist Progress Note      Synopsis: Patient admitted on 2/26/2024 with bilateral leg wounds.   Upon presentation to the ED, patient has a necrotic right foot. Na is low at 118. Previous labs in 10/12 show a Na of 128. CRP is elevated at 210 and Troponin 59-->68. TSH elevated at 18.61 and Free T4 low at 0.8. Leukocytosis of 18.5. Xray of the foot shows extensive air and gas throughout the soft tissues of the right foot to suggest necrotizing fascitis. Ulceration of left heel with evidence of osteomyelitis. Gas indentified along the right ankle mortise both medially and laterally. CT of left foot shows  soft tissue ulceration and soft tissue gas Involving the lateral aspect of the heel pad with underlying bone destruction of the posterolateral calcaneus with osteomyelitis. CT right foot and ankle show bone destruction of calcaneus compatible with OM, extensive tissue ulceration,  Extensive subcutaneous and intra osseous gas within the foot compatible with gas gangrene, no associated abscess. Patient had an arterial bleed and podiatry and vascular ere consulted urgently. Patient initiated on Clindamycin, Vancomycin, and Merrem. Nephrology consulted for hyponatremia. Repeat labs had not be obtained at time of my assumption of care at 7 am. Received IVF. Hemoglobin 8.9. ID, Cardiology, Nephrology, Vascular, Podiatry, and Psychiatry and admitted.  Psychiatry has signed off as patient was pink slipped as he needed medical care. He is agreeing to medical care. Pink slip removed. Started on hypertonic saline with improvement in Na levels. Patient went to the OR urgently for guilbekaine BKA of RLE. He underwent left lower leg debridement down to the bone I&D. He was transferred back to MICU and remains intubated and sedated. Cardiology is recommending limited echo and ASA + Statin when medically stable. Patient to return to the OR with vascular 3/1/2024. He underwent BKA right leg and left leg heel and calf debridement.  33.64 kg/m²   General appearance: Intubated and sedated.   Respiratory:  Normal respiratory effort. Clear to auscultation, bilaterally without Rales/Wheezes/Rhonchi.  Cardiovascular: Regular rate and rhythm with normal S1/S2 without murmurs, rubs or gallops.  Abdomen: Soft, non-tender, non-distended with normal bowel sounds.  Musculoskeletal: Right BKA and Left lower leg dressing.   Neurologic: Intubated and sedated.     Medications:  Reviewed    Infusion Medications    sodium chloride      sodium chloride      sodium chloride      norepinephrine 7 mcg/min (03/02/24 0653)    fentaNYL 100 mcg/hr (03/02/24 0653)    midazolam 3 mg/hr (03/02/24 0653)     Scheduled Medications    lidocaine PF  5 mL IntraDERmal Once    sodium chloride flush  5-40 mL IntraVENous 2 times per day    heparin flush  3 mL IntraVENous 2 times per day    ipratropium 0.5 mg-albuterol 2.5 mg  1 Dose Inhalation Q4H WA RT    sodium chloride (Inhalant)  2 mL Nebulization BID    vancomycin  2,000 mg IntraVENous Q12H    vitamin D  50,000 Units Oral Weekly    hydrocortisone sodium succinate PF  100 mg IntraVENous Q8H    meropenem  1,000 mg IntraVENous q8h    levothyroxine  50 mcg Oral Daily    sodium chloride flush  5-40 mL IntraVENous 2 times per day    miconazole   Topical BID    chlorhexidine  15 mL Mouth/Throat BID    famotidine (PEPCID) injection  20 mg IntraVENous BID     PRN Meds: sodium chloride flush, sodium chloride, heparin flush, sodium chloride, sodium chloride, ondansetron **OR** ondansetron, senna, aluminum & magnesium hydroxide-simethicone, acetaminophen **OR** acetaminophen, methyl salicylate, sodium chloride flush, [Held by provider] oxyCODONE **OR** [Held by provider] oxyCODONE, [Held by provider] HYDROmorphone, methocarbamol, [Held by provider] hydrALAZINE    I/O    Intake/Output Summary (Last 24 hours) at 3/2/2024 0822  Last data filed at 3/2/2024 0653  Gross per 24 hour   Intake 3177.16 ml   Output 1345 ml   Net 1832.16 ml

## 2024-03-02 NOTE — PROGRESS NOTES
Department of Podiatry  Progress Note    SUBJECTIVE:   Patient is seen at bedside for  Left lower extremity wound. Patient is currently intubated and in the ICU.  Patient underwent Guillotine BKA and left lower extremity wound debridement on 2/27/24. Vascular surgery took patient back to the OR  3/1/24. Patient's wife reports that he is doing well and doesn't have any problems or concerns at this time. No new pedal complaints today.     OBJECTIVE:    Scheduled Meds:   lidocaine PF  5 mL IntraDERmal Once    sodium chloride flush  5-40 mL IntraVENous 2 times per day    heparin flush  3 mL IntraVENous 2 times per day    heparin (porcine)  5,000 Units SubCUTAneous Q8H    ipratropium 0.5 mg-albuterol 2.5 mg  1 Dose Inhalation Q4H WA RT    sodium chloride (Inhalant)  2 mL Nebulization BID    vancomycin  2,000 mg IntraVENous Q12H    vitamin D  50,000 Units Oral Weekly    hydrocortisone sodium succinate PF  100 mg IntraVENous Q8H    meropenem  1,000 mg IntraVENous q8h    levothyroxine  50 mcg Oral Daily    sodium chloride flush  5-40 mL IntraVENous 2 times per day    miconazole   Topical BID    chlorhexidine  15 mL Mouth/Throat BID    famotidine (PEPCID) injection  20 mg IntraVENous BID     Continuous Infusions:   sodium chloride 10 mL/hr (03/02/24 1236)    sodium chloride      sodium chloride      norepinephrine 2 mcg/min (03/02/24 1037)    [Held by provider] fentaNYL Stopped (03/02/24 0841)    [Held by provider] midazolam 3 mg/hr (03/02/24 0653)     PRN Meds:.sodium chloride flush, sodium chloride, heparin flush, sodium chloride, sodium chloride, ondansetron **OR** ondansetron, senna, aluminum & magnesium hydroxide-simethicone, acetaminophen **OR** acetaminophen, methyl salicylate, sodium chloride flush, [Held by provider] oxyCODONE **OR** [Held by provider] oxyCODONE, HYDROmorphone, methocarbamol, [Held by provider] hydrALAZINE    Allergies   Allergen Reactions    Metoprolol Other (See Comments)     hallucinations    Pcn

## 2024-03-02 NOTE — PLAN OF CARE
Respiratory - Adult  Goal: Achieves optimal ventilation and oxygenation  3/2/2024 0814 by Juanjose Gaona RN  Outcome: Progressing  3/1/2024 2050 by Erwin Rosa RN  Outcome: Progressing  3/1/2024 1912 by Marianne Rush RCP  Outcome: Not Progressing     Problem: Respiratory - Adult  Goal: Achieves optimal ventilation and oxygenation  3/2/2024 0814 by Juanjose Gaona RN  Outcome: Progressing  3/1/2024 2050 by Erwin Rosa RN  Outcome: Progressing  3/1/2024 1912 by Marianne Rush RCP  Outcome: Not Progressing

## 2024-03-02 NOTE — PROGRESS NOTES
Department of Internal Medicine  Infectious Diseases  Progress  Note      C/C :Morganella bacteremia  Nec fasciitis , sepsis       All above noted   Discussed with the family   Pt is intubated, awake   Reports pain in the stump   Afebrile         Current Facility-Administered Medications   Medication Dose Route Frequency Provider Last Rate Last Admin    lidocaine PF 1 % injection 5 mL  5 mL IntraDERmal Once Rachel Allison MD        sodium chloride flush 0.9 % injection 5-40 mL  5-40 mL IntraVENous 2 times per day Rachel Allison MD        sodium chloride flush 0.9 % injection 5-40 mL  5-40 mL IntraVENous PRN Rachel Allison MD        0.9 % sodium chloride infusion   IntraVENous PRN Rachel Allison MD        heparin (PF) 100 UNIT/ML injection 300 Units  3 mL IntraVENous 2 times per day Rachel Allison MD        heparin (PF) 100 UNIT/ML injection 300 Units  3 mL IntraVENous PRN Rachel Allison MD        ipratropium 0.5 mg-albuterol 2.5 mg (DUONEB) nebulizer solution 1 Dose  1 Dose Inhalation Q4H WA RT Robert Goyal MD   1 Dose at 03/02/24 0808    sodium chloride (Inhalant) 3 % nebulizer solution 2 mL  2 mL Nebulization BID Robert Goyal MD   2 mL at 03/02/24 0808    vancomycin (VANCOCIN) 2,000 mg in sodium chloride 0.9 % 500 mL IVPB  2,000 mg IntraVENous Q12H Robert Goyal MD   Stopped at 03/02/24 0215    0.9 % sodium chloride infusion   IntraVENous PRN Robert Goyal MD        0.9 % sodium chloride infusion   IntraVENous PRN Robert Goyal MD        vitamin D (ERGOCALCIFEROL) capsule 50,000 Units  50,000 Units Oral Weekly Robert Goyal MD   50,000 Units at 02/29/24 0948    hydrocortisone sodium succinate PF (SOLU-CORTEF) injection 100 mg  100 mg IntraVENous Q8H Robert Goyal MD   100 mg at 03/02/24 0217    norepinephrine (LEVOPHED) 16 mg in sodium chloride 0.9 % 250 mL infusion  1-100 mcg/min

## 2024-03-02 NOTE — FLOWSHEET NOTE
Continues to grasp at ett tube when released for repositioning 2 point bilateral soft wrist restraints continued.

## 2024-03-02 NOTE — PROGRESS NOTES
Department of Internal Medicine  Nephrology Attending progress Note    Events reviewed.    SUBJECTIVE: We are following Mr. Tran for hyponatremia.  Remains intubated.    PHYSICAL EXAM:      Vitals:    VITALS:  /65   Pulse 55   Temp 97.2 °F (36.2 °C) (Temporal)   Resp 16   Ht 1.803 m (5' 11\")   Wt 109.4 kg (241 lb 3.2 oz)   SpO2 95%   BMI 33.64 kg/m²   24HR INTAKE/OUTPUT:    Intake/Output Summary (Last 24 hours) at 3/2/2024 1420  Last data filed at 3/2/2024 1300  Gross per 24 hour   Intake 2607.16 ml   Output 3845 ml   Net -1237.84 ml         Constitutional: Patient is awake, alert, in no distress  HEENT: Pupils are equal reactive  Respiratory: Lungs are clear  Cardiovascular/Edema: Heart sounds are regular  Gastrointestinal: Abdomen soft  Neurologic: Nonfocal  Skin: Necrotic right foot, left foot with wounds and blue discoloration  Other: No edema    Scheduled Meds:   lidocaine PF  5 mL IntraDERmal Once    sodium chloride flush  5-40 mL IntraVENous 2 times per day    heparin flush  3 mL IntraVENous 2 times per day    heparin (porcine)  5,000 Units SubCUTAneous Q8H    ipratropium 0.5 mg-albuterol 2.5 mg  1 Dose Inhalation Q4H WA RT    sodium chloride (Inhalant)  2 mL Nebulization BID    vancomycin  2,000 mg IntraVENous Q12H    vitamin D  50,000 Units Oral Weekly    hydrocortisone sodium succinate PF  100 mg IntraVENous Q8H    meropenem  1,000 mg IntraVENous q8h    levothyroxine  50 mcg Oral Daily    sodium chloride flush  5-40 mL IntraVENous 2 times per day    miconazole   Topical BID    chlorhexidine  15 mL Mouth/Throat BID    famotidine (PEPCID) injection  20 mg IntraVENous BID     Continuous Infusions:   sodium chloride 10 mL/hr (03/02/24 1236)    sodium chloride      sodium chloride      norepinephrine 2 mcg/min (03/02/24 1037)    fentaNYL 100 mcg/hr (03/02/24 1310)    midazolam 4 mg/hr (03/02/24 1312)     PRN Meds:.sodium chloride flush, sodium chloride, heparin flush, sodium chloride, sodium  foot ulcers with necrotizing fasciitis and osteomyelitis, status post right BKA, on meropenem and vancomycin  Hypothyroidism, TSH 18.61, on levothyroxine  Nutrition, TF at 50 cc/hour, free water 30 cc every 4 hours     Plan:     Discontinue IV fluid  Continue free water to 30 cc every 4 hour  Continue hydrocortisone 100 mg IV every 8 hours  Continue ergocalciferol 50,000 units weekly  Replace phosphorus  Continue to monitor sodium levels  Renal sign off

## 2024-03-03 ENCOUNTER — APPOINTMENT (OUTPATIENT)
Dept: GENERAL RADIOLOGY | Age: 69
DRG: 474 | End: 2024-03-03
Payer: MEDICARE

## 2024-03-03 LAB
AADO2: 153.6 MMHG
ANION GAP SERPL CALCULATED.3IONS-SCNC: 6 MMOL/L (ref 7–16)
B.E.: 5.9 MMOL/L (ref -3–3)
BASOPHILS # BLD: 0.02 K/UL (ref 0–0.2)
BASOPHILS NFR BLD: 0 % (ref 0–2)
BUN SERPL-MCNC: 30 MG/DL (ref 6–23)
CALCIUM SERPL-MCNC: 8.2 MG/DL (ref 8.6–10.2)
CHLORIDE SERPL-SCNC: 106 MMOL/L (ref 98–107)
CO2 SERPL-SCNC: 31 MMOL/L (ref 22–29)
COHB: 1.5 % (ref 0–1.5)
CREAT SERPL-MCNC: 0.6 MG/DL (ref 0.7–1.2)
CRITICAL: ABNORMAL
DATE ANALYZED: ABNORMAL
DATE LAST DOSE: NORMAL
DATE OF COLLECTION: ABNORMAL
EOSINOPHIL # BLD: 0 K/UL (ref 0.05–0.5)
EOSINOPHILS RELATIVE PERCENT: 0 % (ref 0–6)
ERYTHROCYTE [DISTWIDTH] IN BLOOD BY AUTOMATED COUNT: 20.6 % (ref 11.5–15)
FIO2: 40 %
GFR SERPL CREATININE-BSD FRML MDRD: >60 ML/MIN/1.73M2
GLUCOSE SERPL-MCNC: 164 MG/DL (ref 74–99)
HCO3: 30.6 MMOL/L (ref 22–26)
HCT VFR BLD AUTO: 27.8 % (ref 37–54)
HGB BLD-MCNC: 8.3 G/DL (ref 12.5–16.5)
HHB: 6.4 % (ref 0–5)
IMM GRANULOCYTES # BLD AUTO: 0.09 K/UL (ref 0–0.58)
IMM GRANULOCYTES NFR BLD: 2 % (ref 0–5)
LAB: ABNORMAL
LYMPHOCYTES NFR BLD: 2.08 K/UL (ref 1.5–4)
LYMPHOCYTES RELATIVE PERCENT: 35 % (ref 20–42)
Lab: 505
MAGNESIUM SERPL-MCNC: 2.5 MG/DL (ref 1.6–2.6)
MCH RBC QN AUTO: 25.2 PG (ref 26–35)
MCHC RBC AUTO-ENTMCNC: 29.9 G/DL (ref 32–34.5)
MCV RBC AUTO: 84.2 FL (ref 80–99.9)
METHB: 0.3 % (ref 0–1.5)
MODE: AC
MONOCYTES NFR BLD: 0.56 K/UL (ref 0.1–0.95)
MONOCYTES NFR BLD: 9 % (ref 2–12)
NEUTROPHILS NFR BLD: 54 % (ref 43–80)
NEUTS SEG NFR BLD: 3.18 K/UL (ref 1.8–7.3)
O2 CONTENT: 12.3 ML/DL
O2 SATURATION: 93.5 % (ref 92–98.5)
O2HB: 91.8 % (ref 94–97)
OPERATOR ID: ABNORMAL
PATIENT TEMP: 37 C
PCO2: 44.9 MMHG (ref 35–45)
PEEP/CPAP: 8 CMH2O
PFO2: 1.75 MMHG/%
PH BLOOD GAS: 7.45 (ref 7.35–7.45)
PHOSPHATE SERPL-MCNC: 2.2 MG/DL (ref 2.5–4.5)
PLATELET # BLD AUTO: 313 K/UL (ref 130–450)
PMV BLD AUTO: 8.6 FL (ref 7–12)
PO2: 70 MMHG (ref 75–100)
POTASSIUM SERPL-SCNC: 4.3 MMOL/L (ref 3.5–5)
RBC # BLD AUTO: 3.3 M/UL (ref 3.8–5.8)
RI(T): 2.19
RR MECHANICAL: 16 B/MIN
SODIUM SERPL-SCNC: 143 MMOL/L (ref 132–146)
SOURCE, BLOOD GAS: ABNORMAL
THB: 9.5 G/DL (ref 11.5–16.5)
TIME ANALYZED: 510
TME LAST DOSE: NORMAL H
VANCOMYCIN DOSE: NORMAL MG
VANCOMYCIN TROUGH SERPL-MCNC: 15 UG/ML (ref 5–16)
VT MECHANICAL: 500 ML
WBC OTHER # BLD: 5.9 K/UL (ref 4.5–11.5)

## 2024-03-03 PROCEDURE — 2700000000 HC OXYGEN THERAPY PER DAY

## 2024-03-03 PROCEDURE — 2500000003 HC RX 250 WO HCPCS

## 2024-03-03 PROCEDURE — 6360000002 HC RX W HCPCS: Performed by: STUDENT IN AN ORGANIZED HEALTH CARE EDUCATION/TRAINING PROGRAM

## 2024-03-03 PROCEDURE — 85025 COMPLETE CBC W/AUTO DIFF WBC: CPT

## 2024-03-03 PROCEDURE — 6370000000 HC RX 637 (ALT 250 FOR IP): Performed by: INTERNAL MEDICINE

## 2024-03-03 PROCEDURE — 6360000002 HC RX W HCPCS

## 2024-03-03 PROCEDURE — 2580000003 HC RX 258: Performed by: STUDENT IN AN ORGANIZED HEALTH CARE EDUCATION/TRAINING PROGRAM

## 2024-03-03 PROCEDURE — 83735 ASSAY OF MAGNESIUM: CPT

## 2024-03-03 PROCEDURE — 6370000000 HC RX 637 (ALT 250 FOR IP): Performed by: STUDENT IN AN ORGANIZED HEALTH CARE EDUCATION/TRAINING PROGRAM

## 2024-03-03 PROCEDURE — 2000000000 HC ICU R&B

## 2024-03-03 PROCEDURE — A4216 STERILE WATER/SALINE, 10 ML: HCPCS | Performed by: STUDENT IN AN ORGANIZED HEALTH CARE EDUCATION/TRAINING PROGRAM

## 2024-03-03 PROCEDURE — 6360000002 HC RX W HCPCS: Performed by: SURGERY

## 2024-03-03 PROCEDURE — 84100 ASSAY OF PHOSPHORUS: CPT

## 2024-03-03 PROCEDURE — 94640 AIRWAY INHALATION TREATMENT: CPT

## 2024-03-03 PROCEDURE — 94003 VENT MGMT INPAT SUBQ DAY: CPT

## 2024-03-03 PROCEDURE — 2580000003 HC RX 258

## 2024-03-03 PROCEDURE — 2500000003 HC RX 250 WO HCPCS: Performed by: STUDENT IN AN ORGANIZED HEALTH CARE EDUCATION/TRAINING PROGRAM

## 2024-03-03 PROCEDURE — 99291 CRITICAL CARE FIRST HOUR: CPT | Performed by: INTERNAL MEDICINE

## 2024-03-03 PROCEDURE — 80202 ASSAY OF VANCOMYCIN: CPT

## 2024-03-03 PROCEDURE — 82805 BLOOD GASES W/O2 SATURATION: CPT

## 2024-03-03 PROCEDURE — 80048 BASIC METABOLIC PNL TOTAL CA: CPT

## 2024-03-03 PROCEDURE — 71045 X-RAY EXAM CHEST 1 VIEW: CPT

## 2024-03-03 RX ORDER — FUROSEMIDE 10 MG/ML
40 INJECTION INTRAMUSCULAR; INTRAVENOUS ONCE
Status: COMPLETED | OUTPATIENT
Start: 2024-03-03 | End: 2024-03-03

## 2024-03-03 RX ORDER — IPRATROPIUM BROMIDE AND ALBUTEROL SULFATE 2.5; .5 MG/3ML; MG/3ML
1 SOLUTION RESPIRATORY (INHALATION) EVERY 4 HOURS PRN
Status: DISCONTINUED | OUTPATIENT
Start: 2024-03-03 | End: 2024-03-04 | Stop reason: HOSPADM

## 2024-03-03 RX ORDER — IBUPROFEN 200 MG
TABLET ORAL 2 TIMES DAILY
Status: DISCONTINUED | OUTPATIENT
Start: 2024-03-03 | End: 2024-03-04 | Stop reason: HOSPADM

## 2024-03-03 RX ADMIN — MEROPENEM 1000 MG: 1 INJECTION, POWDER, FOR SOLUTION INTRAVENOUS at 08:18

## 2024-03-03 RX ADMIN — HEPARIN SODIUM 5000 UNITS: 10000 INJECTION INTRAVENOUS; SUBCUTANEOUS at 04:49

## 2024-03-03 RX ADMIN — HEPARIN SODIUM 5000 UNITS: 10000 INJECTION INTRAVENOUS; SUBCUTANEOUS at 14:10

## 2024-03-03 RX ADMIN — HYDRALAZINE HYDROCHLORIDE 10 MG: 20 INJECTION INTRAMUSCULAR; INTRAVENOUS at 08:10

## 2024-03-03 RX ADMIN — LEVOTHYROXINE SODIUM 50 MCG: 0.03 TABLET ORAL at 05:01

## 2024-03-03 RX ADMIN — HEPARIN 300 UNITS: 100 SYRINGE at 19:57

## 2024-03-03 RX ADMIN — SODIUM CHLORIDE, PRESERVATIVE FREE 10 ML: 5 INJECTION INTRAVENOUS at 19:58

## 2024-03-03 RX ADMIN — MICONAZOLE NITRATE: 20.6 POWDER TOPICAL at 19:57

## 2024-03-03 RX ADMIN — MEROPENEM 1000 MG: 1 INJECTION, POWDER, FOR SOLUTION INTRAVENOUS at 23:52

## 2024-03-03 RX ADMIN — MICONAZOLE NITRATE: 20.6 POWDER TOPICAL at 08:18

## 2024-03-03 RX ADMIN — VANCOMYCIN HYDROCHLORIDE 2000 MG: 10 INJECTION, POWDER, LYOPHILIZED, FOR SOLUTION INTRAVENOUS at 13:36

## 2024-03-03 RX ADMIN — HYDROCORTISONE SODIUM SUCCINATE 100 MG: 100 INJECTION, POWDER, FOR SOLUTION INTRAMUSCULAR; INTRAVENOUS at 03:23

## 2024-03-03 RX ADMIN — SODIUM CHLORIDE, PRESERVATIVE FREE 20 MG: 5 INJECTION INTRAVENOUS at 08:04

## 2024-03-03 RX ADMIN — HYDROCORTISONE SODIUM SUCCINATE 50 MG: 100 INJECTION, POWDER, FOR SOLUTION INTRAMUSCULAR; INTRAVENOUS at 17:03

## 2024-03-03 RX ADMIN — HYDROMORPHONE HYDROCHLORIDE 0.5 MG: 1 INJECTION, SOLUTION INTRAMUSCULAR; INTRAVENOUS; SUBCUTANEOUS at 14:08

## 2024-03-03 RX ADMIN — POLYMYXIN B SULFATE, BACITRACIN ZINC, NEOMYCIN SULFATE: 5000; 3.5; 4 OINTMENT TOPICAL at 20:59

## 2024-03-03 RX ADMIN — ALUMINUM HYDROXIDE, MAGNESIUM HYDROXIDE, AND SIMETHICONE 30 ML: 1200; 120; 1200 SUSPENSION ORAL at 19:55

## 2024-03-03 RX ADMIN — Medication 100 MCG/HR: at 01:35

## 2024-03-03 RX ADMIN — IPRATROPIUM BROMIDE AND ALBUTEROL SULFATE 1 DOSE: 2.5; .5 SOLUTION RESPIRATORY (INHALATION) at 11:30

## 2024-03-03 RX ADMIN — DEXMEDETOMIDINE 0.2 MCG/KG/HR: 100 INJECTION, SOLUTION INTRAVENOUS at 08:45

## 2024-03-03 RX ADMIN — CHLORHEXIDINE GLUCONATE 15 ML: 1.2 RINSE ORAL at 08:16

## 2024-03-03 RX ADMIN — FUROSEMIDE 40 MG: 10 INJECTION, SOLUTION INTRAMUSCULAR; INTRAVENOUS at 10:02

## 2024-03-03 RX ADMIN — OXYCODONE HYDROCHLORIDE 10 MG: 10 TABLET ORAL at 16:58

## 2024-03-03 RX ADMIN — SODIUM CHLORIDE, PRESERVATIVE FREE 10 ML: 5 INJECTION INTRAVENOUS at 19:57

## 2024-03-03 RX ADMIN — HEPARIN SODIUM 5000 UNITS: 10000 INJECTION INTRAVENOUS; SUBCUTANEOUS at 19:58

## 2024-03-03 RX ADMIN — SODIUM CHLORIDE, PRESERVATIVE FREE 20 MG: 5 INJECTION INTRAVENOUS at 19:57

## 2024-03-03 RX ADMIN — HYDROMORPHONE HYDROCHLORIDE 0.5 MG: 1 INJECTION, SOLUTION INTRAMUSCULAR; INTRAVENOUS; SUBCUTANEOUS at 20:59

## 2024-03-03 RX ADMIN — IPRATROPIUM BROMIDE AND ALBUTEROL SULFATE 1 DOSE: 2.5; .5 SOLUTION RESPIRATORY (INHALATION) at 07:35

## 2024-03-03 RX ADMIN — HYDRALAZINE HYDROCHLORIDE 10 MG: 20 INJECTION INTRAMUSCULAR; INTRAVENOUS at 16:04

## 2024-03-03 RX ADMIN — OXYCODONE HYDROCHLORIDE 10 MG: 10 TABLET ORAL at 11:01

## 2024-03-03 RX ADMIN — MEROPENEM 1000 MG: 1 INJECTION, POWDER, FOR SOLUTION INTRAVENOUS at 15:55

## 2024-03-03 RX ADMIN — VANCOMYCIN HYDROCHLORIDE 2000 MG: 10 INJECTION, POWDER, LYOPHILIZED, FOR SOLUTION INTRAVENOUS at 23:54

## 2024-03-03 RX ADMIN — ONDANSETRON 4 MG: 4 TABLET, ORALLY DISINTEGRATING ORAL at 21:12

## 2024-03-03 RX ADMIN — Medication 2 ML: at 07:35

## 2024-03-03 RX ADMIN — HEPARIN 300 UNITS: 100 SYRINGE at 08:16

## 2024-03-03 RX ADMIN — HYDROCORTISONE SODIUM SUCCINATE 100 MG: 100 INJECTION, POWDER, FOR SOLUTION INTRAMUSCULAR; INTRAVENOUS at 10:02

## 2024-03-03 ASSESSMENT — PAIN DESCRIPTION - FREQUENCY
FREQUENCY: INTERMITTENT

## 2024-03-03 ASSESSMENT — PAIN DESCRIPTION - ORIENTATION
ORIENTATION_2: LEFT
ORIENTATION: RIGHT;LEFT
ORIENTATION_2: LEFT
ORIENTATION: LEFT;RIGHT
ORIENTATION: RIGHT;LEFT

## 2024-03-03 ASSESSMENT — PAIN DESCRIPTION - LOCATION
LOCATION_2: LEG
LOCATION: LEG
LOCATION: LEG
LOCATION_2: LEG
LOCATION: LEG

## 2024-03-03 ASSESSMENT — PAIN DESCRIPTION - INTENSITY
RATING_2: 4
RATING_2: 4

## 2024-03-03 ASSESSMENT — PAIN SCALES - WONG BAKER
WONGBAKER_NUMERICALRESPONSE: 0

## 2024-03-03 ASSESSMENT — PAIN DESCRIPTION - ONSET
ONSET: ON-GOING
ONSET: GRADUAL
ONSET: ON-GOING
ONSET: ON-GOING

## 2024-03-03 ASSESSMENT — PAIN DESCRIPTION - DESCRIPTORS
DESCRIPTORS_2: DISCOMFORT
DESCRIPTORS: ACHING;DISCOMFORT
DESCRIPTORS: ACHING;DISCOMFORT
DESCRIPTORS: ACHING
DESCRIPTORS: ACHING;DISCOMFORT
DESCRIPTORS: DISCOMFORT;ACHING
DESCRIPTORS_2: DISCOMFORT

## 2024-03-03 ASSESSMENT — PAIN - FUNCTIONAL ASSESSMENT
PAIN_FUNCTIONAL_ASSESSMENT_SITE2: PREVENTS OR INTERFERES SOME ACTIVE ACTIVITIES AND ADLS
PAIN_FUNCTIONAL_ASSESSMENT_SITE2: PREVENTS OR INTERFERES SOME ACTIVE ACTIVITIES AND ADLS
PAIN_FUNCTIONAL_ASSESSMENT: PREVENTS OR INTERFERES SOME ACTIVE ACTIVITIES AND ADLS

## 2024-03-03 ASSESSMENT — PULMONARY FUNCTION TESTS
PIF_VALUE: 16
PIF_VALUE: 24
PIF_VALUE: 22

## 2024-03-03 ASSESSMENT — PAIN SCALES - GENERAL
PAINLEVEL_OUTOF10: 2
PAINLEVEL_OUTOF10: 6
PAINLEVEL_OUTOF10: 8
PAINLEVEL_OUTOF10: 0
PAINLEVEL_OUTOF10: 7
PAINLEVEL_OUTOF10: 4
PAINLEVEL_OUTOF10: 9
PAINLEVEL_OUTOF10: 8
PAINLEVEL_OUTOF10: 8
PAINLEVEL_OUTOF10: 2

## 2024-03-03 ASSESSMENT — PAIN DESCRIPTION - PAIN TYPE
TYPE: ACUTE PAIN

## 2024-03-03 NOTE — PROGRESS NOTES
Spontaneous Parameters performed on PS 5    VT = 291 ml  f = 21  B/M  Ve = 6.12 L/M  NIF = -37  cmH2O  VC = 1.3 L  RSBI = 72    Pt has audible cuff leak.      Performed by Wandy Lowry RCP

## 2024-03-03 NOTE — PROGRESS NOTES
[facility]  Department of Internal Medicine   Internal Medicine Residency   MICU Progress Note    Patient:  Navin Tran 68 y.o. male  MRN: [id number]     Date of Service: 3/3/2024    Allergy: Metoprolol and Pcn [penicillins]    Overnight Events:   Hgb stable overnight.  Patient has been off of Versed since 3 in the morning.  Patient has not required any pressors since 2 days.  Patient got a PICC line placed yesterday.    Subjective     Patient was examined by the bedside in the AM.  Patient was alert and following commands.  He just showed that he wanted the tube out.  He also just said that there is pain in his right lower extremity.  Output from the wound VAC noticed, minimal output. SBT started    ROS: unable to be achieved due to intubation.    Objective     VS:   Patient Vitals for the past 12 hrs:   BP Temp Temp src Pulse Resp SpO2   03/03/24 0736 -- -- -- 85 18 98 %   03/03/24 0600 132/70 -- -- 67 19 96 %   03/03/24 0500 138/77 -- -- 80 20 95 %   03/03/24 0415 -- -- -- 60 16 96 %   03/03/24 0400 125/61 -- -- 60 16 98 %   03/03/24 0300 129/63 -- -- 57 16 96 %   03/03/24 0200 (!) 126/59 -- -- 64 19 97 %   03/03/24 0100 (!) 97/50 -- -- 53 16 97 %   03/03/24 0013 -- -- -- 53 16 97 %   03/03/24 0000 (!) 104/55 -- -- 54 16 98 %   03/02/24 2200 (!) 100/49 -- -- 50 16 97 %   03/02/24 2100 (!) 100/53 -- -- 52 17 97 %   03/02/24 2000 (!) 100/51 98 °F (36.7 °C) Temporal 53 16 97 %     ]    I & O - 24hr:   Intake/Output Summary (Last 24 hours) at 3/3/2024 0749  Last data filed at 3/3/2024 0638  Gross per 24 hour   Intake 2085.76 ml   Output 4550 ml   Net -2464.24 ml         Net IO Since Admission: 4,136.43 mL [03/03/24 0749]    Sedatives: None    Pressors: Levophed held for now.    Oxygen: Extubated  to 8 L of nasal cannula.    ABG:       Recent Labs     03/01/24  0416 03/02/24  0505 03/03/24  0505   PH 7.429 7.433 7.451*   PCO2 42.0 43.4 44.9   PO2 107.4* 76.5 70.0*   HCO3 27.2* 28.4* 30.6*   BE 2.6  tissues of the right foot   to suggest necrotizing fasciitis.   2. Ulceration identified along the left heel with radiopaque foreign bodies   identified in the soft tissues. There is some cortical destruction seen of   the left calcaneus to suggest osteomyelitis.   3. Gas identified along the right ankle mortise both medially and laterally.   No cortical regularity to suggest osteomyelitis.   4. Left tibia and fibula is unremarkable with edema seen diffusely throughout   the left lower extremity.         XR TIBIA FIBULA RIGHT (2 VIEWS)   Final Result   1. Extensive air and gas seen throughout the soft tissues of the right foot   to suggest necrotizing fasciitis.   2. Ulceration identified along the left heel with radiopaque foreign bodies   identified in the soft tissues. There is some cortical destruction seen of   the left calcaneus to suggest osteomyelitis.   3. Gas identified along the right ankle mortise both medially and laterally.   No cortical regularity to suggest osteomyelitis.   4. Left tibia and fibula is unremarkable with edema seen diffusely throughout   the left lower extremity.         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 resolved  S/p 2 unit of blood transfusion already.  Another unit of blood transfusion ordered this morning.  Bradycardia 2/2 in the setting of shock vs sedatives > stable  EKG on 2/20/2024 showed sinus bradycardia with AV block.  Hypotonic hyponatremia 2/2

## 2024-03-03 NOTE — PROGRESS NOTES
VASCULAR SURGERY  DAILY PROGRESS NOTE  3/3/2024    CHIEF COMPLAINT:  Chief Complaint   Patient presents with    Wound Check     Pt is pink slipped by [place] [employer]. Per pt got frost bite 3 years ago to bilateral feet. Right foot is black and necrotic. Has been spraying \"blue animal wound  to feet for years\".  Has arterial bleed from one wound when dressing removed.       SUBJECTIVE:  Patient remains intubated and in restraints. No overnight events. He is now off pressors    OBJECTIVE:  /70   Pulse 67   Temp 98 °F (36.7 °C) (Temporal)   Resp 19   Ht 1.803 m (5' 11\")   Wt 109.4 kg (241 lb 3.2 oz)   SpO2 96%   BMI 33.64 kg/m²     GENERAL:  no acute distress, intubated   HEENT: normocephalic, ET tube present  LUNGS:  mechanically ventilated. Equal chest rise  CARDIOVASCULAR:regular rate, normotensive.  SKIN: wounds to LLE hemostatic, healthier appearing s/p debridement     ABDOMEN:  Soft, non-distended, nontender. No guarding, rigidity, rebound.    EXT: RLE BKA guillotine amp with wound vac in place with minimal output.     ASSESSMENT/PLAN:  68 y.o. male with necrotizing fasciitis and soft tissue infection  s/p RLE guillotine amp 2/27 s/p revision 3/1, debridement LLE     Plan  -monitor HgB  -pain control  -monitor lower extremity exam, LLE dressing changed at bedside today. Will plan to take wound vac dressing down 3/4  -no further plans for acute return to OR at this time, still remains at risk for L LE limb loss  -antibiotics per infectious disease team- currently on meropenem and vancomycin    Electronically signed by Maximo Roman DO on 3/3/2024 at 7:25 AM     Pt seen and examined  Remains intubated    R LE prevana in place  Dressing change L LE - overall appearance improved    Appreciate critical care input - weaning vent - hopeful for extubation today  Abx per id    Katerina Wild MD

## 2024-03-03 NOTE — PLAN OF CARE
Problem: Discharge Planning  Goal: Discharge to home or other facility with appropriate resources  3/3/2024 0836 by Juanjose Gaona RN  Outcome: Progressing  3/2/2024 2123 by Erwin Rosa RN  Outcome: Progressing     Problem: Pain  Goal: Verbalizes/displays adequate comfort level or baseline comfort level  3/3/2024 0836 by Juanjose Gaona RN  Outcome: Progressing  3/2/2024 2123 by Erwin Rosa RN  Outcome: Progressing  Flowsheets (Taken 3/2/2024 1400 by Juanjose Gaona RN)  Verbalizes/displays adequate comfort level or baseline comfort level: Encourage patient to monitor pain and request assistance     Problem: Skin/Tissue Integrity  Goal: Absence of new skin breakdown  Description: 1.  Monitor for areas of redness and/or skin breakdown  2.  Assess vascular access sites hourly  3.  Every 4-6 hours minimum:  Change oxygen saturation probe site  4.  Every 4-6 hours:  If on nasal continuous positive airway pressure, respiratory therapy assess nares and determine need for appliance change or resting period.  3/3/2024 0836 by Juanjose Gaona RN  Outcome: Progressing  3/2/2024 2123 by Erwin Rosa RN  Outcome: Progressing     Problem: ABCDS Injury Assessment  Goal: Absence of physical injury  3/3/2024 0836 by Juanjose aGona RN  Outcome: Progressing  3/2/2024 2123 by Erwin Rosa RN  Outcome: Progressing     Problem: Safety - Medical Restraint  Goal: Remains free of injury from restraints (Restraint for Interference with Medical Device)  Description: INTERVENTIONS:  1. Determine that other, less restrictive measures have been tried or would not be effective before applying the restraint  2. Evaluate the patient's condition at the time of restraint application  3. Inform patient/family regarding the reason for restraint  4. Q2H: Monitor safety, psychosocial status, comfort, nutrition and hydration  3/3/2024 0836 by Juanjose Gaona RN  Outcome: Progressing  Flowsheets  Taken 3/3/2024  Erwin Rosa, RN  Outcome: Progressing     Problem: Respiratory - Adult  Goal: Achieves optimal ventilation and oxygenation  3/3/2024 0836 by Juanjose Gaona, RN  Outcome: Progressing  3/2/2024 2123 by Erwin Rosa, RN  Outcome: Progressing  Flowsheets (Taken 3/2/2024 1630 by Juanjose Gaona, RN)  Achieves optimal ventilation and oxygenation: Assess for changes in respiratory status

## 2024-03-03 NOTE — PROGRESS NOTES
methocarbamol, [Held by provider] hydrALAZINE    Allergies   Allergen Reactions    Metoprolol Other (See Comments)     hallucinations    Pcn [Penicillins]      Unknown reaction       /70   Pulse 67   Temp 98 °F (36.7 °C) (Temporal)   Resp 19   Ht 1.803 m (5' 11\")   Wt 109.4 kg (241 lb 3.2 oz)   SpO2 96%   BMI 33.64 kg/m²       EXAM:  LOWER EXTREMITY EXAMINATION     Previous examination. Dressing is CDI to the left lower extremity with foot in offloading boot.      VASCULAR:  DP and PT pulses are non-palpable.  Warm to warm from the tibial tuberosity to the distal aspect of the digits dorsally.      NEUROLOGIC:  Protective sensation is grossly intact MILE.     DERM:  Skin is well hydrated to the bilateral lower extremities. There are wounds to the proximal 1/3 of the tibia. The right foot has extensive wounds with black eschars covering 75% of the foot. Malodor, fluctuance, crepitance, edema, and erythema are all present.  Left foot has eschar-covered wound to the heel. No fluctuance, crepitance, or drainage present. Edema, and erythema are present. Toenails 1-5 b/l are abnormal in thickness, color and length.                                        Scheduled Meds:   lidocaine PF  5 mL IntraDERmal Once    sodium chloride flush  5-40 mL IntraVENous 2 times per day    heparin flush  3 mL IntraVENous 2 times per day    heparin (porcine)  5,000 Units SubCUTAneous Q8H    ipratropium 0.5 mg-albuterol 2.5 mg  1 Dose Inhalation Q4H WA RT    sodium chloride (Inhalant)  2 mL Nebulization BID    vancomycin  2,000 mg IntraVENous Q12H    vitamin D  50,000 Units Oral Weekly    hydrocortisone sodium succinate PF  100 mg IntraVENous Q8H    meropenem  1,000 mg IntraVENous q8h    levothyroxine  50 mcg Oral Daily    sodium chloride flush  5-40 mL IntraVENous 2 times per day    miconazole   Topical BID    chlorhexidine  15 mL Mouth/Throat BID    famotidine (PEPCID) injection  20 mg IntraVENous BID     Continuous Infusions:    throughout the soft tissues of the right foot   to suggest necrotizing fasciitis.   2. Ulceration identified along the left heel with radiopaque foreign bodies   identified in the soft tissues. There is some cortical destruction seen of   the left calcaneus to suggest osteomyelitis.   3. Gas identified along the right ankle mortise both medially and laterally.   No cortical regularity to suggest osteomyelitis.   4. Left tibia and fibula is unremarkable with edema seen diffusely throughout   the left lower extremity.         XR CHEST PORTABLE    (Results Pending)   XR CHEST PORTABLE    (Results Pending)     /70   Pulse 67   Temp 98 °F (36.7 °C) (Temporal)   Resp 19   Ht 1.803 m (5' 11\")   Wt 109.4 kg (241 lb 3.2 oz)   SpO2 96%   BMI 33.64 kg/m²     LABS:    Recent Labs     03/02/24  0458 03/03/24  0415   WBC 6.2 5.9   HGB 8.2* 8.3*   HCT 26.2* 27.8*    313        Recent Labs     03/03/24  0415      K 4.3      CO2 31*   PHOS 2.2*   BUN 30*   CREATININE 0.6*        Recent Labs     03/02/24  0838   PROT 5.6*     ASSESSMENTS:   - necrotizing fasciitis - right foot  - open wounds - left foot     PLAN:  - Patient was examined and evaluated.Reviewed patient's recent lab results, charts and pertinent diagnostic imaging.Reviewed ancillary service notes.  - Antibiotics as per ID: meropenem, vancomycin appreciate recommendations  - Vascular: underwent right guillotine BKA and left lower extremity debridement with Vascular surgery on 2/27/24. Patient to be taken back to the OR 3/1/24.   -Will continue to follow for left lower extremity wound.  - Will continue to follow patient while they are in-house.  - Patient discussed with Dr. Mi.

## 2024-03-03 NOTE — FLOWSHEET NOTE
Patient continues to demonstrate behavior such as grabbing/pulling at tubes/lines/drains that are essential for medical treatment.

## 2024-03-03 NOTE — PROGRESS NOTES
Pharmacy Consultation Note  (Antibiotic Dosing and Monitoring)    Initial consult date: 2/27/24  Consulting physician/provider: Dr. Chi  Drug: Vancomycin  Indication: Nec fasc of bilateral lower extremities    Age/  Gender Height Weight IBW  Allergy Information   68 y.o./male 180.3 cm (5' 11\") 104.3 kg (230 lb)     Ideal body weight: 75.3 kg (166 lb 0.1 oz)  Adjusted ideal body weight: 88.9 kg (196 lb 1.3 oz)   Metoprolol and Pcn [penicillins]      Renal Function:  Recent Labs     03/02/24  0458 03/02/24  1504 03/03/24  0415   BUN 23 26*  25* 30*   CREATININE 0.6* 0.6*  0.6* 0.6*         Intake/Output Summary (Last 24 hours) at 3/3/2024 0837  Last data filed at 3/3/2024 0638  Gross per 24 hour   Intake 2055.76 ml   Output 4430 ml   Net -2374.24 ml         Vancomycin Monitoring:  Trough:    Recent Labs     02/29/24  2301   VANCOTROUGH 9.9       Random:  No results for input(s): \"VANCORANDOM\" in the last 72 hours.    Vancomycin Administration Times:  Recent vancomycin administrations                     vancomycin (VANCOCIN) 2,000 mg in sodium chloride 0.9 % 500 mL IVPB (mg) 2,000 mg New Bag 02/27/24 0158                    Assessment:  Patient is a 68 y.o. male who has been initiated on vancomycin  Estimated Creatinine Clearance: 152 mL/min (A) (based on SCr of 0.6 mg/dL (L)).  ID following  MSSA, pan sensitive enterococcus avium, group b strep    Plan:  Vancomycin to 2000 mg IV q12h  Trough 15. Continue regimen    Isabela Martinez, PharmD, BCPS, BCCCP 3/3/2024 8:37 AM

## 2024-03-03 NOTE — PROGRESS NOTES
(03/03/24 0328)     PRN Meds:.sodium chloride flush, sodium chloride, heparin flush, sodium chloride, sodium chloride, ondansetron **OR** ondansetron, senna, aluminum & magnesium hydroxide-simethicone, acetaminophen **OR** acetaminophen, methyl salicylate, sodium chloride flush, oxyCODONE **OR** oxyCODONE, HYDROmorphone, methocarbamol, hydrALAZINE      DATA:    CBC:   Lab Results   Component Value Date/Time    WBC 5.9 03/03/2024 04:15 AM    RBC 3.30 03/03/2024 04:15 AM    HGB 8.3 03/03/2024 04:15 AM    HCT 27.8 03/03/2024 04:15 AM    MCV 84.2 03/03/2024 04:15 AM    MCH 25.2 03/03/2024 04:15 AM    MCHC 29.9 03/03/2024 04:15 AM    RDW 20.6 03/03/2024 04:15 AM     03/03/2024 04:15 AM    MPV 8.6 03/03/2024 04:15 AM     CMP:    Lab Results   Component Value Date/Time     03/03/2024 04:15 AM    K 4.3 03/03/2024 04:15 AM     03/03/2024 04:15 AM    CO2 31 03/03/2024 04:15 AM    BUN 30 03/03/2024 04:15 AM    CREATININE 0.6 03/03/2024 04:15 AM    LABGLOM >60 03/03/2024 04:15 AM    GLUCOSE 164 03/03/2024 04:15 AM    PROT 5.6 03/02/2024 08:38 AM    LABALBU 3.0 03/02/2024 08:38 AM    CALCIUM 8.2 03/03/2024 04:15 AM    BILITOT 0.3 03/02/2024 08:38 AM    ALKPHOS 84 03/02/2024 08:38 AM    AST 55 03/02/2024 08:38 AM    ALT 38 03/02/2024 08:38 AM     Magnesium:    Lab Results   Component Value Date/Time    MG 2.5 03/03/2024 04:15 AM     Phosphorus:    Lab Results   Component Value Date/Time    PHOS 2.2 03/03/2024 04:15 AM       Radiology Review:      CT ANKLE LEFT WO CONTRAST 2/27/24      IMPRESSION:  1. Soft tissue ulceration and soft tissue gas involving the lateral aspect of  the heel pad with underlying bone destruction of the posterolateral calcaneus  compatible with osteomyelitis.  2. Soft tissue swelling medially and laterally at the ankle with soft tissue  ulceration anterolaterally. No underlying bone destruction of the tibia or  fibula.  3. No evidence of acute fracture or dislocation of the ankle or  transfused  -------------------------------------------------------  Respiratory failure status post intubation  Bilateral foot ulcers with necrotizing fasciitis and osteomyelitis, status post right BKA, on meropenem and vancomycin  Hypothyroidism, TSH 18.61, on levothyroxine  Nutrition, TF at 50 cc/hour, free water 30 cc every 4 hours     Plan:       Continue free water to 30 cc every 4 hour  Continue hydrocortisone 100 mg IV every 8 hours  Continue ergocalciferol 50,000 units weekly  Replace phosphorus  Continue to monitor sodium levels  We will follow peripherally

## 2024-03-03 NOTE — PROGRESS NOTES
1.  Bone destruction of the calcaneus compatible with osteomyelitis    2.  Extensive soft tissue ulceration    3.  Extensive subcutaneous and intra osseous gas within the foot compatible  with gas gangrene, no associated abscess.    4.  Extensive dorsal subcutaneous edema       IMPRESSION:       Morganella  bacteremia, sepsis   Necrotizing fasciitis - bilateral feet,osteomyelitis s/p right BKA, left foot I & D ( 2/27)   Leukocytosis - resolved     RECOMMENDATIONS:      Meropenem 1 gram IV q 8 hrs ( PCN allergy ) , Vancomycin IV 1500 mg  q 12 hrs   Supportive care, wound Care

## 2024-03-03 NOTE — PROGRESS NOTES
Patient was extubated to 4 liters/min via nasal cannula. Breath Sounds post extubation were clear. Stridor was not present post extubation. SPO2 was 92%.      Performed by  Wandy Lowry RCP

## 2024-03-03 NOTE — PROGRESS NOTES
Hospitalist Progress Note      Synopsis: Patient admitted on 2/26/2024 with bilateral leg wounds.   Upon presentation to the ED, patient has a necrotic right foot. Na is low at 118. Previous labs in 10/12 show a Na of 128. CRP is elevated at 210 and Troponin 59-->68. TSH elevated at 18.61 and Free T4 low at 0.8. Leukocytosis of 18.5. Xray of the foot shows extensive air and gas throughout the soft tissues of the right foot to suggest necrotizing fascitis. Ulceration of left heel with evidence of osteomyelitis. Gas indentified along the right ankle mortise both medially and laterally. CT of left foot shows  soft tissue ulceration and soft tissue gas Involving the lateral aspect of the heel pad with underlying bone destruction of the posterolateral calcaneus with osteomyelitis. CT right foot and ankle show bone destruction of calcaneus compatible with OM, extensive tissue ulceration,  Extensive subcutaneous and intra osseous gas within the foot compatible with gas gangrene, no associated abscess. Patient had an arterial bleed and podiatry and vascular ere consulted urgently. Patient initiated on Clindamycin, Vancomycin, and Merrem. Nephrology consulted for hyponatremia. Repeat labs had not be obtained at time of my assumption of care at 7 am. Received IVF. Hemoglobin 8.9. ID, Cardiology, Nephrology, Vascular, Podiatry, and Psychiatry and admitted.  Psychiatry has signed off as patient was pink slipped as he needed medical care. He is agreeing to medical care. Pink slip removed. Started on hypertonic saline with improvement in Na levels. Patient went to the OR urgently for guilbekaine BKA of RLE. He underwent left lower leg debridement down to the bone I&D. He was transferred back to MICU and remains intubated and sedated. Cardiology is recommending limited echo and ASA + Statin when medically stable. Patient to return to the OR with vascular 3/1/2024. He underwent BKA right leg and left leg heel and calf debridement.

## 2024-03-04 ENCOUNTER — APPOINTMENT (OUTPATIENT)
Dept: GENERAL RADIOLOGY | Age: 69
DRG: 474 | End: 2024-03-04
Payer: MEDICARE

## 2024-03-04 VITALS
OXYGEN SATURATION: 95 % | HEART RATE: 50 BPM | RESPIRATION RATE: 18 BRPM | HEIGHT: 71 IN | DIASTOLIC BLOOD PRESSURE: 61 MMHG | TEMPERATURE: 97.6 F | SYSTOLIC BLOOD PRESSURE: 129 MMHG | BODY MASS INDEX: 33.77 KG/M2 | WEIGHT: 241.2 LBS

## 2024-03-04 PROBLEM — R79.89 LOW SERUM CORTISOL LEVEL: Status: ACTIVE | Noted: 2024-03-04

## 2024-03-04 PROBLEM — E03.9 PRIMARY HYPOTHYROIDISM: Status: ACTIVE | Noted: 2024-03-04

## 2024-03-04 LAB
ANION GAP SERPL CALCULATED.3IONS-SCNC: 5 MMOL/L (ref 7–16)
BASOPHILS # BLD: 0 K/UL (ref 0–0.2)
BASOPHILS NFR BLD: 0 % (ref 0–2)
BUN SERPL-MCNC: 29 MG/DL (ref 6–23)
CALCIUM SERPL-MCNC: 8.3 MG/DL (ref 8.6–10.2)
CHLORIDE SERPL-SCNC: 103 MMOL/L (ref 98–107)
CO2 SERPL-SCNC: 35 MMOL/L (ref 22–29)
CREAT SERPL-MCNC: 0.6 MG/DL (ref 0.7–1.2)
EOSINOPHIL # BLD: 0 K/UL (ref 0.05–0.5)
EOSINOPHILS RELATIVE PERCENT: 0 % (ref 0–6)
ERYTHROCYTE [DISTWIDTH] IN BLOOD BY AUTOMATED COUNT: 20.8 % (ref 11.5–15)
GFR SERPL CREATININE-BSD FRML MDRD: >60 ML/MIN/1.73M2
GLUCOSE SERPL-MCNC: 101 MG/DL (ref 74–99)
HCT VFR BLD AUTO: 26.9 % (ref 37–54)
HGB BLD-MCNC: 8.1 G/DL (ref 12.5–16.5)
LYMPHOCYTES NFR BLD: 1.95 K/UL (ref 1.5–4)
LYMPHOCYTES RELATIVE PERCENT: 30 % (ref 20–42)
MAGNESIUM SERPL-MCNC: 2.3 MG/DL (ref 1.6–2.6)
MCH RBC QN AUTO: 25.4 PG (ref 26–35)
MCHC RBC AUTO-ENTMCNC: 30.1 G/DL (ref 32–34.5)
MCV RBC AUTO: 84.3 FL (ref 80–99.9)
MONOCYTES NFR BLD: 0.63 K/UL (ref 0.1–0.95)
MONOCYTES NFR BLD: 10 % (ref 2–12)
NEUTROPHILS NFR BLD: 61 % (ref 43–80)
NEUTS SEG NFR BLD: 4.02 K/UL (ref 1.8–7.3)
PHOSPHATE SERPL-MCNC: 2.1 MG/DL (ref 2.5–4.5)
PLATELET # BLD AUTO: 270 K/UL (ref 130–450)
PMV BLD AUTO: 8.7 FL (ref 7–12)
POTASSIUM SERPL-SCNC: 3.3 MMOL/L (ref 3.5–5)
RBC # BLD AUTO: 3.19 M/UL (ref 3.8–5.8)
RBC # BLD: ABNORMAL 10*6/UL
SODIUM SERPL-SCNC: 143 MMOL/L (ref 132–146)
WBC OTHER # BLD: 6.6 K/UL (ref 4.5–11.5)

## 2024-03-04 PROCEDURE — 85025 COMPLETE CBC W/AUTO DIFF WBC: CPT

## 2024-03-04 PROCEDURE — 2700000000 HC OXYGEN THERAPY PER DAY

## 2024-03-04 PROCEDURE — 2500000003 HC RX 250 WO HCPCS

## 2024-03-04 PROCEDURE — 6360000002 HC RX W HCPCS: Performed by: STUDENT IN AN ORGANIZED HEALTH CARE EDUCATION/TRAINING PROGRAM

## 2024-03-04 PROCEDURE — 2500000003 HC RX 250 WO HCPCS: Performed by: STUDENT IN AN ORGANIZED HEALTH CARE EDUCATION/TRAINING PROGRAM

## 2024-03-04 PROCEDURE — 99232 SBSQ HOSP IP/OBS MODERATE 35: CPT | Performed by: STUDENT IN AN ORGANIZED HEALTH CARE EDUCATION/TRAINING PROGRAM

## 2024-03-04 PROCEDURE — 2580000003 HC RX 258: Performed by: STUDENT IN AN ORGANIZED HEALTH CARE EDUCATION/TRAINING PROGRAM

## 2024-03-04 PROCEDURE — 6370000000 HC RX 637 (ALT 250 FOR IP): Performed by: STUDENT IN AN ORGANIZED HEALTH CARE EDUCATION/TRAINING PROGRAM

## 2024-03-04 PROCEDURE — 99233 SBSQ HOSP IP/OBS HIGH 50: CPT | Performed by: INTERNAL MEDICINE

## 2024-03-04 PROCEDURE — 71045 X-RAY EXAM CHEST 1 VIEW: CPT

## 2024-03-04 PROCEDURE — 6360000002 HC RX W HCPCS

## 2024-03-04 PROCEDURE — 6360000002 HC RX W HCPCS: Performed by: SURGERY

## 2024-03-04 PROCEDURE — 84100 ASSAY OF PHOSPHORUS: CPT

## 2024-03-04 PROCEDURE — A4216 STERILE WATER/SALINE, 10 ML: HCPCS | Performed by: STUDENT IN AN ORGANIZED HEALTH CARE EDUCATION/TRAINING PROGRAM

## 2024-03-04 PROCEDURE — 2580000003 HC RX 258

## 2024-03-04 PROCEDURE — 99222 1ST HOSP IP/OBS MODERATE 55: CPT | Performed by: INTERNAL MEDICINE

## 2024-03-04 PROCEDURE — 80048 BASIC METABOLIC PNL TOTAL CA: CPT

## 2024-03-04 PROCEDURE — 6370000000 HC RX 637 (ALT 250 FOR IP)

## 2024-03-04 PROCEDURE — 83735 ASSAY OF MAGNESIUM: CPT

## 2024-03-04 RX ORDER — HYDROCORTISONE 5 MG/1
15 TABLET ORAL EVERY MORNING
Status: DISCONTINUED | OUTPATIENT
Start: 2024-03-05 | End: 2024-03-04

## 2024-03-04 RX ORDER — HYDROCORTISONE 5 MG/1
10 TABLET ORAL EVERY MORNING
Status: DISCONTINUED | OUTPATIENT
Start: 2024-03-05 | End: 2024-03-04 | Stop reason: HOSPADM

## 2024-03-04 RX ORDER — HYDROCORTISONE 5 MG/1
5 TABLET ORAL EVERY EVENING
Status: DISCONTINUED | OUTPATIENT
Start: 2024-03-05 | End: 2024-03-04 | Stop reason: HOSPADM

## 2024-03-04 RX ORDER — POTASSIUM CHLORIDE 20 MEQ/1
20 TABLET, EXTENDED RELEASE ORAL ONCE
Status: COMPLETED | OUTPATIENT
Start: 2024-03-04 | End: 2024-03-04

## 2024-03-04 RX ADMIN — MICONAZOLE NITRATE: 20.6 POWDER TOPICAL at 20:34

## 2024-03-04 RX ADMIN — HYDROMORPHONE HYDROCHLORIDE 0.5 MG: 1 INJECTION, SOLUTION INTRAMUSCULAR; INTRAVENOUS; SUBCUTANEOUS at 04:22

## 2024-03-04 RX ADMIN — POLYMYXIN B SULFATE, BACITRACIN ZINC, NEOMYCIN SULFATE: 5000; 3.5; 4 OINTMENT TOPICAL at 20:31

## 2024-03-04 RX ADMIN — HYDROMORPHONE HYDROCHLORIDE 0.5 MG: 1 INJECTION, SOLUTION INTRAMUSCULAR; INTRAVENOUS; SUBCUTANEOUS at 08:16

## 2024-03-04 RX ADMIN — VANCOMYCIN HYDROCHLORIDE 2000 MG: 10 INJECTION, POWDER, LYOPHILIZED, FOR SOLUTION INTRAVENOUS at 12:12

## 2024-03-04 RX ADMIN — HEPARIN SODIUM 5000 UNITS: 10000 INJECTION INTRAVENOUS; SUBCUTANEOUS at 13:12

## 2024-03-04 RX ADMIN — MEROPENEM 1000 MG: 1 INJECTION, POWDER, FOR SOLUTION INTRAVENOUS at 08:47

## 2024-03-04 RX ADMIN — POTASSIUM CHLORIDE 20 MEQ: 1500 TABLET, EXTENDED RELEASE ORAL at 11:09

## 2024-03-04 RX ADMIN — HEPARIN SODIUM 5000 UNITS: 10000 INJECTION INTRAVENOUS; SUBCUTANEOUS at 04:13

## 2024-03-04 RX ADMIN — HEPARIN 300 UNITS: 100 SYRINGE at 08:45

## 2024-03-04 RX ADMIN — POLYMYXIN B SULFATE, BACITRACIN ZINC, NEOMYCIN SULFATE: 5000; 3.5; 4 OINTMENT TOPICAL at 08:47

## 2024-03-04 RX ADMIN — HEPARIN 300 UNITS: 100 SYRINGE at 20:31

## 2024-03-04 RX ADMIN — HEPARIN SODIUM 5000 UNITS: 10000 INJECTION INTRAVENOUS; SUBCUTANEOUS at 20:31

## 2024-03-04 RX ADMIN — SODIUM CHLORIDE, PRESERVATIVE FREE 10 ML: 5 INJECTION INTRAVENOUS at 08:48

## 2024-03-04 RX ADMIN — POTASSIUM PHOSPHATE, MONOBASIC AND POTASSIUM PHOSPHATE, DIBASIC 20 MMOL: 224; 236 INJECTION, SOLUTION, CONCENTRATE INTRAVENOUS at 08:53

## 2024-03-04 RX ADMIN — HYDROMORPHONE HYDROCHLORIDE 0.5 MG: 1 INJECTION, SOLUTION INTRAMUSCULAR; INTRAVENOUS; SUBCUTANEOUS at 01:24

## 2024-03-04 RX ADMIN — OXYCODONE HYDROCHLORIDE 10 MG: 10 TABLET ORAL at 21:42

## 2024-03-04 RX ADMIN — MEROPENEM 1000 MG: 1 INJECTION, POWDER, FOR SOLUTION INTRAVENOUS at 16:04

## 2024-03-04 RX ADMIN — LEVOTHYROXINE SODIUM 50 MCG: 0.03 TABLET ORAL at 06:10

## 2024-03-04 RX ADMIN — MICONAZOLE NITRATE: 20.6 POWDER TOPICAL at 08:47

## 2024-03-04 RX ADMIN — SODIUM CHLORIDE, PRESERVATIVE FREE 10 ML: 5 INJECTION INTRAVENOUS at 08:49

## 2024-03-04 RX ADMIN — HYDROCORTISONE SODIUM SUCCINATE 50 MG: 100 INJECTION, POWDER, FOR SOLUTION INTRAMUSCULAR; INTRAVENOUS at 04:13

## 2024-03-04 RX ADMIN — HYDROCORTISONE SODIUM SUCCINATE 50 MG: 100 INJECTION, POWDER, FOR SOLUTION INTRAMUSCULAR; INTRAVENOUS at 17:55

## 2024-03-04 RX ADMIN — HYDROCORTISONE SODIUM SUCCINATE 50 MG: 100 INJECTION, POWDER, FOR SOLUTION INTRAMUSCULAR; INTRAVENOUS at 11:09

## 2024-03-04 RX ADMIN — SODIUM CHLORIDE, PRESERVATIVE FREE 20 MG: 5 INJECTION INTRAVENOUS at 08:44

## 2024-03-04 RX ADMIN — SODIUM CHLORIDE, PRESERVATIVE FREE 10 ML: 5 INJECTION INTRAVENOUS at 20:32

## 2024-03-04 ASSESSMENT — PAIN SCALES - GENERAL
PAINLEVEL_OUTOF10: 0
PAINLEVEL_OUTOF10: 4
PAINLEVEL_OUTOF10: 7

## 2024-03-04 ASSESSMENT — PAIN DESCRIPTION - PAIN TYPE: TYPE: SURGICAL PAIN

## 2024-03-04 ASSESSMENT — PAIN DESCRIPTION - LOCATION
LOCATION: ABDOMEN;LEG
LOCATION: GENERALIZED

## 2024-03-04 NOTE — RT PROTOCOL NOTE
RT Nebulizer Bronchodilator Protocol Note    There is a bronchodilator order in the chart from a provider indicating to follow the RT Bronchodilator Protocol and there is an “Initiate RT Bronchodilator Protocol” order as well (see protocol at bottom of note).    CXR Findings:  XR CHEST PORTABLE    Result Date: 3/3/2024  Some improvement seen in the aeration of the lung bases.  Decrease seen in size of the right pleural effusion.  PICC line catheter on the right tip in the SVC.     XR CHEST PORTABLE    Result Date: 3/2/2024  Slight improved areas of increased density with loss of volume in the right lower lung base. Persistent increased parenchymal past towards the left lung base since the recent study March 1st.       The findings from the last RT Protocol Assessment were as follows:  Smoking: Smoker 15 pack years or more  Respiratory Pattern: Regular pattern and RR 12-20 bpm  Breath Sounds: Clear breath sounds  Cough: Strong, spontaneous, non-productive  Indication for Bronchodilator Therapy: None  Bronchodilator Assessment Score: 1    Aerosolized bronchodilator medication orders have been revised according to the RT Nebulizer Bronchodilator Protocol below.    Respiratory Therapist to perform RT Therapy Protocol Assessment initially then follow the protocol.  Repeat RT Therapy Protocol Assessment PRN for score 0-3 or on second treatment, BID, and PRN for scores above 3.    No Indications - adjust the frequency to every 6 hours PRN wheezing or bronchospasm, if no treatments needed after 48 hours then discontinue using Per Protocol order mode.     If indication present, adjust the RT bronchodilator orders based on the Bronchodilator Assessment Score as indicated below.  If a patient is on this medication at home then do not decrease Frequency below that used at home.    0-3 - enter or revise RT bronchodilator order(s) to equivalent RT Bronchodilator order with Frequency of every 4 hours PRN for wheezing or increased  work of breathing using Per Protocol order mode.       4-6 - enter or revise RT Bronchodilator order(s) to two equivalent RT bronchodilator orders with one order with BID Frequency and one order with Frequency of every 4 hours PRN wheezing or increased work of breathing using Per Protocol order mode.         7-10 - enter or revise RT Bronchodilator order(s) to two equivalent RT bronchodilator orders with one order with TID Frequency and one order with Frequency of every 4 hours PRN wheezing or increased work of breathing using Per Protocol order mode.       11-13 - enter or revise RT Bronchodilator order(s) to one equivalent RT bronchodilator order with QID Frequency and an Albuterol order with Frequency of every 4 hours PRN wheezing or increased work of breathing using Per Protocol order mode.      Greater than 13 - enter or revise RT Bronchodilator order(s) to one equivalent RT bronchodilator order with every 4 hours Frequency and an Albuterol order with Frequency of every 2 hours PRN wheezing or increased work of breathing using Per Protocol order mode.         Electronically signed by Wandy Lowry RCP on 3/3/2024 at 9:54 PM

## 2024-03-04 NOTE — PROGRESS NOTES
Pharmacy Consultation Note  (Antibiotic Dosing and Monitoring)    Initial consult date: 2/27/24  Consulting physician/provider: Dr. Chi  Drug: Vancomycin  Indication: Nec fasc of bilateral lower extremities    Age/  Gender Height Weight IBW  Allergy Information   68 y.o./male 180.3 cm (5' 11\") 104.3 kg (230 lb)     Ideal body weight: 75.3 kg (166 lb 0.1 oz)  Adjusted ideal body weight: 88.9 kg (196 lb 1.3 oz)   Metoprolol and Pcn [penicillins]      Renal Function:  Recent Labs     03/02/24  1504 03/03/24  0415 03/04/24  0409   BUN 26*  25* 30* 29*   CREATININE 0.6*  0.6* 0.6* 0.6*         Intake/Output Summary (Last 24 hours) at 3/4/2024 1244  Last data filed at 3/4/2024 1200  Gross per 24 hour   Intake 1540.09 ml   Output 2225 ml   Net -684.91 ml         Vancomycin Monitoring:  Trough:    Recent Labs     03/03/24  1020   VANCOTROUGH 15.0       Random:  No results for input(s): \"VANCORANDOM\" in the last 72 hours.    Vancomycin Administration Times:  Recent vancomycin administrations                     vancomycin (VANCOCIN) 2,000 mg in sodium chloride 0.9 % 500 mL IVPB (mg) 2,000 mg New Bag 02/27/24 0158                    Assessment:  Patient is a 68 y.o. male who has been initiated on vancomycin  Estimated Creatinine Clearance: 152 mL/min (A) (based on SCr of 0.6 mg/dL (L)).  ID following  MSSA, pan sensitive enterococcus avium, group b strep    Plan:  Continue vancomycin 2000 mg IV q12h      Saba Marcum, PharmD Candidate 2024 3/4/2024 12:44 PM

## 2024-03-04 NOTE — PROGRESS NOTES
osteomyelitis.   3. Gas identified along the right ankle mortise both medially and laterally.   No cortical regularity to suggest osteomyelitis.   4. Left tibia and fibula is unremarkable with edema seen diffusely throughout   the left lower extremity.         XR CHEST PORTABLE    (Results Pending)     BP (!) 146/97   Pulse 66   Temp 97.8 °F (36.6 °C) (Temporal)   Resp 14   Ht 1.803 m (5' 11\")   Wt 109.4 kg (241 lb 3.2 oz)   SpO2 98%   BMI 33.64 kg/m²     LABS:    Recent Labs     03/03/24  0415 03/04/24  0409   WBC 5.9 6.6   HGB 8.3* 8.1*   HCT 27.8* 26.9*    270          Recent Labs     03/04/24  0409      K 3.3*      CO2 35*   PHOS 2.1*   BUN 29*   CREATININE 0.6*          Recent Labs     03/02/24  0838   PROT 5.6*       ASSESSMENTS:   - necrotizing fasciitis - right foot  - open wounds - left foot     PLAN:  - Patient was examined and evaluated.Reviewed patient's recent lab results, charts and pertinent diagnostic imaging.Reviewed ancillary service notes.  - Antibiotics as per ID: meropenem, vancomycin appreciate recommendations  - Vascular: underwent right guillotine BKA and left lower extremity debridement with Vascular surgery on 2/27/24. Patient taken back to the OR on 3/1/24. Vascular surgery managing dressings.   -Patient is to be discharged to [facility] today at 8pm   -Will continue to follow for left lower extremity wounds.   - Will continue to follow patient while they are in-house.  - Patient discussed and seen with Dr. Mi.     Noy Prabhakar Podiatry PGY2  3/4/2024   4:19 PM

## 2024-03-04 NOTE — PROGRESS NOTES
Department of Internal Medicine  Nephrology Attending Consult Note    Events reviewed.    SUBJECTIVE: We are following Mr. Tran for hyponatremia.  Remains intubated.    PHYSICAL EXAM:      Vitals:    VITALS:  BP (!) 184/90   Pulse 87   Temp 98 °F (36.7 °C) (Temporal)   Resp 24   Ht 1.803 m (5' 11\")   Wt 109.4 kg (241 lb 3.2 oz)   SpO2 (!) 84%   BMI 33.64 kg/m²   24HR INTAKE/OUTPUT:    Intake/Output Summary (Last 24 hours) at 3/4/2024 0845  Last data filed at 3/4/2024 0600  Gross per 24 hour   Intake 1300.09 ml   Output 2850 ml   Net -1549.91 ml         Constitutional: Patient is awake, alert, in no distress  HEENT: Pupils are equal reactive  Respiratory: Lungs are clear  Cardiovascular/Edema: Heart sounds are regular  Gastrointestinal: Abdomen soft  Neurologic: Nonfocal  Skin: Necrotic right foot, left foot with wounds and blue discoloration  Other: No edema    Scheduled Meds:   potassium phosphate IVPB (CENTRAL LINE)  20 mmol IntraVENous Once    hydrocortisone sodium succinate PF  50 mg IntraVENous Q8H    neomycin-bacitracin-polymyxin   Topical BID    lidocaine PF  5 mL IntraDERmal Once    sodium chloride flush  5-40 mL IntraVENous 2 times per day    heparin flush  3 mL IntraVENous 2 times per day    heparin (porcine)  5,000 Units SubCUTAneous Q8H    vancomycin  2,000 mg IntraVENous Q12H    vitamin D  50,000 Units Oral Weekly    meropenem  1,000 mg IntraVENous q8h    levothyroxine  50 mcg Oral Daily    sodium chloride flush  5-40 mL IntraVENous 2 times per day    miconazole   Topical BID    chlorhexidine  15 mL Mouth/Throat BID    famotidine (PEPCID) injection  20 mg IntraVENous BID     Continuous Infusions:   sodium chloride Stopped (03/02/24 1531)    sodium chloride      sodium chloride       PRN Meds:.ipratropium 0.5 mg-albuterol 2.5 mg, sodium chloride flush, sodium chloride, heparin flush, sodium chloride, sodium chloride, ondansetron **OR** ondansetron, senna, aluminum & magnesium  hydroxide-simethicone, acetaminophen **OR** acetaminophen, methyl salicylate, sodium chloride flush, oxyCODONE **OR** oxyCODONE, HYDROmorphone, methocarbamol, hydrALAZINE      DATA:    CBC:   Lab Results   Component Value Date/Time    WBC 6.6 03/04/2024 04:09 AM    RBC 3.19 03/04/2024 04:09 AM    HGB 8.1 03/04/2024 04:09 AM    HCT 26.9 03/04/2024 04:09 AM    MCV 84.3 03/04/2024 04:09 AM    MCH 25.4 03/04/2024 04:09 AM    MCHC 30.1 03/04/2024 04:09 AM    RDW 20.8 03/04/2024 04:09 AM     03/04/2024 04:09 AM    MPV 8.7 03/04/2024 04:09 AM     CMP:    Lab Results   Component Value Date/Time     03/04/2024 04:09 AM    K 3.3 03/04/2024 04:09 AM     03/04/2024 04:09 AM    CO2 35 03/04/2024 04:09 AM    BUN 29 03/04/2024 04:09 AM    CREATININE 0.6 03/04/2024 04:09 AM    LABGLOM >60 03/04/2024 04:09 AM    GLUCOSE 101 03/04/2024 04:09 AM    PROT 5.6 03/02/2024 08:38 AM    LABALBU 3.0 03/02/2024 08:38 AM    CALCIUM 8.3 03/04/2024 04:09 AM    BILITOT 0.3 03/02/2024 08:38 AM    ALKPHOS 84 03/02/2024 08:38 AM    AST 55 03/02/2024 08:38 AM    ALT 38 03/02/2024 08:38 AM     Magnesium:    Lab Results   Component Value Date/Time    MG 2.3 03/04/2024 04:09 AM     Phosphorus:    Lab Results   Component Value Date/Time    PHOS 2.1 03/04/2024 04:09 AM       Radiology Review:      CT ANKLE LEFT WO CONTRAST 2/27/24      IMPRESSION:  1. Soft tissue ulceration and soft tissue gas involving the lateral aspect of  the heel pad with underlying bone destruction of the posterolateral calcaneus  compatible with osteomyelitis.  2. Soft tissue swelling medially and laterally at the ankle with soft tissue  ulceration anterolaterally. No underlying bone destruction of the tibia or  fibula.  3. No evidence of acute fracture or dislocation of the ankle or foot.    CT ANKLE RIGHT WO CONTRAST 2/27/24      IMPRESSION:  1.  Bone destruction of the calcaneus compatible with osteomyelitis     2.  Extensive soft tissue ulceration     3.

## 2024-03-04 NOTE — PROGRESS NOTES
Vascular Surgery Progress Note    Pt is being seen in f/u today regarding R LE necrotizing fascitis, B LE tissue loss    Subjective  Pt s/e.  Extubated yesterday.  Sitting up in bed in no distress.  Awake, alert and oriented.  Having some mild pain at the right BKA site and left heel.  Otherwise feels good.    Current Medications:    sodium chloride Stopped (03/02/24 1531)    sodium chloride      sodium chloride        ipratropium 0.5 mg-albuterol 2.5 mg, sodium chloride flush, sodium chloride, heparin flush, sodium chloride, sodium chloride, ondansetron **OR** ondansetron, senna, aluminum & magnesium hydroxide-simethicone, acetaminophen **OR** acetaminophen, methyl salicylate, sodium chloride flush, oxyCODONE **OR** oxyCODONE, HYDROmorphone, methocarbamol, hydrALAZINE    potassium phosphate IVPB (CENTRAL LINE)  20 mmol IntraVENous Once    potassium chloride  20 mEq Oral Once    hydrocortisone sodium succinate PF  50 mg IntraVENous Q8H    neomycin-bacitracin-polymyxin   Topical BID    lidocaine PF  5 mL IntraDERmal Once    sodium chloride flush  5-40 mL IntraVENous 2 times per day    heparin flush  3 mL IntraVENous 2 times per day    heparin (porcine)  5,000 Units SubCUTAneous Q8H    vancomycin  2,000 mg IntraVENous Q12H    vitamin D  50,000 Units Oral Weekly    meropenem  1,000 mg IntraVENous q8h    levothyroxine  50 mcg Oral Daily    sodium chloride flush  5-40 mL IntraVENous 2 times per day    miconazole   Topical BID    chlorhexidine  15 mL Mouth/Throat BID      PHYSICAL EXAM:    BP (!) 184/90   Pulse 87   Temp 98 °F (36.7 °C) (Temporal)   Resp 24   Ht 1.803 m (5' 11\")   Wt 109.4 kg (241 lb 3.2 oz)   SpO2 (!) 84%   BMI 33.64 kg/m²     Intake/Output Summary (Last 24 hours) at 3/4/2024 1001  Last data filed at 3/4/2024 0600  Gross per 24 hour   Intake 1300.09 ml   Output 2275 ml   Net -974.91 ml        Gen Awake, alert, oriented x3, in no apparent distress   CVS S1S2   Resp No increased work of breathing -  4L nc  Abd Soft, non-tender, non-distended   R LE BKA wrapped - media reviewed - well approximated with staples in place  L LE Leg wrapped    LABS:    Lab Results   Component Value Date    WBC 6.6 03/04/2024    HGB 8.1 (L) 03/04/2024    HCT 26.9 (L) 03/04/2024     03/04/2024    PROTIME 15.9 (H) 02/26/2024    INR 1.5 02/26/2024    K 3.3 (L) 03/04/2024    BUN 29 (H) 03/04/2024    CREATININE 0.6 (L) 03/04/2024     A/P R LE necrotizing fascitits, L LE wounds  S/p R BK guillotine amp, L LE debridement 2/27  Septic shock  Medical management per critical care  Extubated and off pressors now  Monitor H/H: Hgb 8.1 -  stable  Abx per ID: meropenem, vanco  Diet as tolerated  Hyponatremia-  now resolved  Daily dressing changes to R BKA site and L LE - done earlier today  Pt remains at high risk of left lower extremity limb loss given extent of the wound    Jesús Hackett, DORIAN - CNP

## 2024-03-04 NOTE — PROGRESS NOTES
chloride 0.9 % 500 mL IVPB  2,000 mg IntraVENous Q12H Robert Goyal MD   Stopped at 03/04/24 0205    0.9 % sodium chloride infusion   IntraVENous PRN Robert Goyal MD        0.9 % sodium chloride infusion   IntraVENous PRN Robert Goyal MD        vitamin D (ERGOCALCIFEROL) capsule 50,000 Units  50,000 Units Oral Weekly Robert Goyal MD   50,000 Units at 02/29/24 0948    meropenem (MERREM) 1,000 mg in sodium chloride 0.9 % 100 mL IVPB ([id number])  1,000 mg IntraVENous q8h Robert Goyal MD 25 mL/hr at 03/04/24 0847 1,000 mg at 03/04/24 0847    ondansetron (ZOFRAN-ODT) disintegrating tablet 4 mg  4 mg Oral Q8H PRN Robert Goyal MD   4 mg at 03/03/24 2112    Or    ondansetron (ZOFRAN) injection 4 mg  4 mg IntraVENous Q6H PRN Robert Goyal MD        senna (SENOKOT) tablet 8.6 mg  1 tablet Oral Daily PRN Robert Goyal MD        aluminum & magnesium hydroxide-simethicone (MAALOX) 200-200-20 MG/5ML suspension 30 mL  30 mL Oral Q6H PRN Robert Goyal MD   30 mL at 03/03/24 1955    acetaminophen (TYLENOL) tablet 650 mg  650 mg Oral Q6H PRN Robert Goyal MD        Or    acetaminophen (TYLENOL) suppository 650 mg  650 mg Rectal Q6H PRN Robert Goyal MD        levothyroxine (SYNTHROID) tablet 50 mcg  50 mcg Oral Daily Robert Goyal MD   50 mcg at 03/04/24 0610    methyl salicylate liquid   Topical PRN Robert Goyal MD        sodium chloride flush 0.9 % injection 5-40 mL  5-40 mL IntraVENous 2 times per day Robert Goyal MD   10 mL at 03/04/24 0849    sodium chloride flush 0.9 % injection 5-40 mL  5-40 mL IntraVENous PRN Robert Goyal MD   2 mL at 02/29/24 1107    miconazole (MICOTIN) 2 % powder   Topical BID Robert Goyal MD   Given at 03/04/24 0847    oxyCODONE (ROXICODONE) immediate release tablet 5 mg  5 mg Oral Q4H PRN Robert Goyal MD        Or    oxyCODONE HCl (OXY-IR) immediate release tablet 10 mg  10 mg Oral Q4H PRN Robert Goyal MD   10 mg at 03/03/24 1658    HYDROmorphone (DILAUDID) injection 0.5 mg   tissue   ulceration anterolaterally. No underlying bone destruction of the tibia or   fibula.   3. No evidence of acute fracture or dislocation of the ankle or foot.     Right foot CT scan -    mpression:        1.  Bone destruction of the calcaneus compatible with osteomyelitis    2.  Extensive soft tissue ulceration    3.  Extensive subcutaneous and intra osseous gas within the foot compatible  with gas gangrene, no associated abscess.    4.  Extensive dorsal subcutaneous edema       IMPRESSION:       Morganella  bacteremia, sepsis   Necrotizing fasciitis - bilateral feet,osteomyelitis s/p right BKA, left foot I & D ( 2/27)   Leukocytosis - resolved     RECOMMENDATIONS:      Meropenem 1 gram IV q 8 hrs ( PCN allergy ) , Vancomycin IV 1500 mg  q 12 hrs   Vascular following

## 2024-03-04 NOTE — PLAN OF CARE
Problem: Pain  Goal: Verbalizes/displays adequate comfort level or baseline comfort level  Outcome: Progressing     Problem: Skin/Tissue Integrity  Goal: Absence of new skin breakdown  Outcome: Progressing     Problem: ABCDS Injury Assessment  Goal: Absence of physical injury  Outcome: Progressing

## 2024-03-04 NOTE — PROGRESS NOTES
Hospitalist Progress Note      Synopsis:   This is a 68-year-old male with no known past medical history who presented to the hospital on 2/26/2024 with bilateral leg wounds.   Upon presentation to the ED, patient has a necrotic right foot.     While in the hospital patient found to have necrotizing fasciitis and soft tissue infection and is s/p below-knee right lower extremity amputation on 2/27 and revision on 3/1.  Patient also had debridement of left lower extremity on 3/1.   Patient is also being treated for hyponatremia, hypothyroidism, was initially intubated and is now s/p extubation on 3/3/2024    Patient is being followed by ID, vascular surgery, podiatry.  He is currently on IV antibiotics per ID.    Assessment and Plan      Gas Gangrene/Osteomyelitis/Early Sepsis/Morganella Bacteremia s/p right BKA on 2/27 and revision on 3/1  Patient is s/p left lower extremity debridement on 3/1  Currently on meropenem and vancomycin  Vascular surgery, podiatry and ID following  Patient remains at risk of left lower extremity limb loss.  Surgical cultures are growing Proteus, Morganella, Enterococcus, Streptococcus agalactiae and Staphylococcus.      Hyponatremia  Improved     Elevated Troponin   -59-->68  -EKG with no STEMI   -Proceed with surgery with no testing  ECHO pending and showed EF of 50-55% Paradoxical septal wall motion. Normal diastolic function. No significant valvular disease or pericardial effusion   -ASA and Statin when okay with surgery.      Elevated TSH   Adrenal insufficiency  -Free T4 low at 0.8   -Start low dose synthroid   Endocrinology has been consulted     Anemia/Acute Blood loss anemia  -Type and screen  -Transfuse for hemoglobin <7 or with further active bleeding   -Arterial bleeding stopped in the ED   -Trend   -Avoid anti-coagulation   -Received pRBCs during stay  -Has remained stable.      Pink Slipped by PD  -Psychiatry consult --S/O-->patient agreeing to life saving care   -Appears  sodium chloride flush, sodium chloride, heparin flush, sodium chloride, sodium chloride, ondansetron **OR** ondansetron, senna, aluminum & magnesium hydroxide-simethicone, acetaminophen **OR** acetaminophen, methyl salicylate, sodium chloride flush, oxyCODONE **OR** oxyCODONE, HYDROmorphone, methocarbamol, hydrALAZINE    I/O    Intake/Output Summary (Last 24 hours) at 3/4/2024 0807  Last data filed at 3/4/2024 0600  Gross per 24 hour   Intake 1300.09 ml   Output 2850 ml   Net -1549.91 ml       Labs:   Recent Labs     03/02/24  0458 03/03/24  0415 03/04/24  0409   WBC 6.2 5.9 6.6   HGB 8.2* 8.3* 8.1*   HCT 26.2* 27.8* 26.9*    313 270       Recent Labs     03/02/24  0458 03/02/24  1504 03/03/24  0415 03/04/24  0409    140  142 143 143   K 4.5 4.3  4.1 4.3 3.3*    103  105 106 103   CO2 27 30*  29 31* 35*   BUN 23 26*  25* 30* 29*   CREATININE 0.6* 0.6*  0.6* 0.6* 0.6*   CALCIUM 8.1* 8.3*  8.2* 8.2* 8.3*   PHOS 2.7  --  2.2* 2.1*           Chronic labs:  Lab Results   Component Value Date    CHOL 84 02/28/2024    TRIG 67 02/28/2024    HDL 26 (L) 02/28/2024    TSH 18.61 (H) 02/26/2024    INR 1.5 02/26/2024    LABA1C 6.0 (H) 02/28/2024         +++++++++++++++++++++++++++++++++++++++++++++++++  Parveen Jenkins MD   [facility].  +++++++++++++++++++++++++++++++++++++++++++++++++  NOTE: This report was transcribed using voice recognition software. Every effort was made to ensure accuracy; however, inadvertent computerized transcription errors may be present.
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
## 2024-03-04 NOTE — CARE COORDINATION
Care Coordination: LOS 6 day.  Met with pt and agreeable to [facility]. Call to wife and she is agreeable.  Spoke with ICU team and okay to transfer.  [facility] is [facility] bed. Also per wife, pt does not have part B, is only Medicare part A but she is working on obtaining again. She would like notified once bed [facility] becomes available  addendum  Room 4215 B.      Electronically signed by Bonita Perez RN on 3/4/2024 at 1:48 PM     ADDENDUM: Messaged attending regarding [facility] and discharge order     Electronically signed by Bonita Perez RN on 3/4/2024 at 1:51 PM     ADDENDUM: Bed for [facility] will be ready 8 pm. Wife notified room number and time.    Electronically signed by Bonita Perez RN on 3/4/2024 at 2:03 PM

## 2024-03-04 NOTE — PROGRESS NOTES
[facility]  Department of Internal Medicine   Internal Medicine Residency   MICU Progress Note    Patient:  Navin Tran 68 y.o. male  MRN: [id number]     Date of Service: 3/4/2024    Allergy: Metoprolol and Pcn [penicillins]    Overnight Events:   Hgb stable overnight.  Patient is on 4 L nasal cannula.  Wound Vac removed.    Subjective     Patient was examined by the bedside in the AM.  Patient was alert and following commands.     ROS: Negative except above.    Objective     VS:   Patient Vitals for the past 12 hrs:   BP Temp Temp src Pulse Resp SpO2   03/04/24 1600 138/71 97.1 °F (36.2 °C) Temporal 62 19 94 %   03/04/24 1425 -- -- -- 62 -- --   03/04/24 1400 (!) 145/71 -- -- 62 12 95 %   03/04/24 1300 (!) 144/72 -- -- 62 20 96 %   03/04/24 1200 136/68 97.6 °F (36.4 °C) Temporal 61 21 95 %   03/04/24 1100 125/70 -- -- 61 17 96 %   03/04/24 1000 -- -- -- 66 14 98 %   03/04/24 0900 (!) 146/97 -- -- 71 14 91 %   03/04/24 0846 -- -- -- -- 24 --   03/04/24 0800 (!) 144/74 97.8 °F (36.6 °C) Temporal 61 16 94 %   03/04/24 0700 (!) 152/79 -- -- 64 16 93 %   03/04/24 0600 (!) 184/90 -- -- 87 24 (!) 84 %   03/04/24 0500 131/69 -- -- 57 19 96 %   03/04/24 0452 -- -- -- -- 16 --   ]    I & O - 24hr:   Intake/Output Summary (Last 24 hours) at 3/4/2024 1606  Last data filed at 3/4/2024 1412  Gross per 24 hour   Intake 1090 ml   Output 1225 ml   Net -135 ml       Net IO Since Admission: 3,076.52 mL [03/04/24 1606]    Sedatives: None    Pressors: Levophed held for now.    Oxygen: Extubated  to 8 L of nasal cannula.    ABG:       Recent Labs     03/02/24  0505 03/03/24  0505   PH 7.433 7.451*   PCO2 43.4 44.9   PO2 76.5 70.0*   HCO3 28.4* 30.6*   BE 3.7* 5.9*   O2SAT 94.5 93.5       Physical Exam:  General Appearance: Patient alert and oriented.  Neuro: Round symmetric pupil that react to light bilaterally.   Cardiovascular: regular rate and rhythm, S1 and S2 heard, no murmurs appreciated   Respiratory: Clear to  Meningitidis, NMNI Not Detected     Staphylococcus spp Not Detected     Staphylococcus Aureus Not Detected     Staphylococcus epidermidis Not Detected     Staphylococcus lugdunensis Not Detected     Streptococcus spp Not Detected     Enterococcus faecalis Not Detected     Enterococcus faecium Not Detected     Streptococcus agalactiae (Group B) Not Detected     Streptococcus pneumoniae Not Detected     Streptococcus pyogenes Not Detected     Resistant gene imp by PCR Not Detected     Resistant gene ctx-m by PCR Not Detected     Resistant gene kpc by PCR Not Detected     Resistant gene ndm by PCR Not Detected     Resistant gene oxa-48-like by pcr Not Detected     Resistant gene vim by PCR Not Detected    Susceptibility        Morganella morganii ssp morganii      BACTERIAL SUSCEPTIBILITY PANEL CATHY      ceFAZolin >=64  Resistant      cefepime 0.016  Sensitive      cefotaxime <=0.25  Sensitive      cefOXitin 16  Intermediate      cefTAZidime <=1  Sensitive      ciprofloxacin <=0.06  Sensitive      levofloxacin <=0.12  Sensitive      meropenem <=0.25  Sensitive      piperacillin-tazobactam <=4  Sensitive      trimethoprim-sulfamethoxazole <=20  Sensitive                           COVID-19 & Influenza Combo [[id number]] Collected: 02/26/24 3568    Order Status: Completed Specimen: Nasopharyngeal Swab Updated: 02/27/24 0019     Specimen Description .NASOPHARYNGEAL SWAB     Source Unknown     SARS-CoV-2 RNA, RT PCR Not Detected     Comment:       Testing was performed using BHAKTI Jayda SARS-CoV-2 and Influenza A/B nucleic acid assay.  This test is a multiplex Real-Time Reverse Transcriptase Polymerase Chain Reaction   (RT-PCR)-based in vitro diagnostic test intended for the qualitative detection of nucleic   acids from SARS-CoV-2,  influenza A, and influenza B in nasopharyngeal and nasal swab specimens for use under the   FDA's Emergency Use Authorization (EUA) only.        Not Detected results do not preclude SARS-CoV-2

## 2024-03-05 NOTE — DISCHARGE SUMMARY
[facility] Hospitalist Discharge Summary         Navin Tran  MRN: [id number]  Account Number: [id number]  Admitted: 2/26/2024  Discharge Date: 03/04/24    PCP Handoff    Recommended Outpatient Testing      Results Pending At Discharge          Clinical Summary  This is a 68-year-old male with no known past medical history who presented to the hospital on 2/26/2024 with bilateral leg wounds.   Upon presentation to the ED, patient has a necrotic right foot.      While in the hospital patient found to have necrotizing fasciitis and soft tissue infection and is s/p below-knee right lower extremity amputation on 2/27 and revision on 3/1.  Patient also had debridement of left lower extremity on 3/1.   Patient is also being treated for hyponatremia, hypothyroidism, was initially intubated and is now s/p extubation on 3/3/2024     Patient is being followed by ID, vascular surgery, podiatry.  He is currently on IV antibiotics per ID.     Assessment and Plan      Gas Gangrene/Osteomyelitis/Early Sepsis/Morganella Bacteremia s/p right BKA on 2/27 and revision on 3/1  Patient is s/p left lower extremity debridement on 3/1  Currently on meropenem and vancomycin  Vascular surgery, podiatry and ID following  Patient remains at risk of left lower extremity limb loss.  Surgical cultures are growing Proteus, Morganella, Enterococcus, Streptococcus agalactiae and Staphylococcus.      Hyponatremia  Improved     Elevated Troponin   -59-->68  -EKG with no STEMI   -Proceed with surgery with no testing  ECHO pending and showed EF of 50-55% Paradoxical septal wall motion. Normal diastolic function. No significant valvular disease or pericardial effusion   -ASA and Statin when okay with surgery.      Elevated TSH   Adrenal insufficiency  -Free T4 low at 0.8   -Start low dose synthroid   Endocrinology has been consulted     Anemia/Acute Blood loss anemia  -Type and screen  -Transfuse for hemoglobin <7 or with further active  bleeding   -Arterial bleeding stopped in the ED   -Trend   -Avoid anti-coagulation   -Received pRBCs during stay  -Has remained stable.      Pink Slipped by PD  -Psychiatry consult --S/O-->patient agreeing to life saving care   -Appears to have poor insight   -Not homicidal or suicidal -Pink slip was removed     Acute Hypoxic Respiratory failure  -Extubate to 4L NC   -Wean 02 as tolerated.      Disposition: Patient was discharged to select long-term care.      Discharge Medications     Medication List        CONTINUE taking these medications      silver sulfADIAZINE 1 % cream  Commonly known as: Silvadene  Apply topically twice daily.                  Physician(s) Follow Up:  No follow-up provider specified.    Condition at Discharge: Fair    Disposition:  Long-Term Acute Care      I reviewed discharge recommendations with the patient in person.    Patient instructions, including activity, were given to the patient/family at discharge.  On day of discharge I saw Navin Tran and Total time spent on day of encounter: 35 minutes on discharge.    Completed by: Parveen Jenkins MD on 03/05/24, 7:49 AM
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
## 2024-03-07 LAB — SURGICAL PATHOLOGY REPORT: NORMAL

## 2024-03-10 LAB
MICROORGANISM SPEC CULT: NORMAL
MICROORGANISM/AGENT SPEC: NORMAL
SERVICE CMNT-IMP: NORMAL
SPECIMEN DESCRIPTION: NORMAL

## 2024-03-12 NOTE — PROGRESS NOTES
Physician Progress Note      PATIENT:               JEB PATEL  [facility] #:                  [id number]  :                       1955  ADMIT DATE:       2024 10:44 PM  DISCH DATE:        3/4/2024 11:06 PM  RESPONDING  PROVIDER #:        Karma Tilley DO          QUERY TEXT:    Patient admitted with necrotic foot wound with gas gangrene . Noted   documentation of early sepsis in H&P . In order to support the diagnosis   of sepsis, please include additional clinical indicators in your   documentation.  Or please document if the diagnosis of sepsis has been ruled   out after further study    The medical record reflects the following:  Risk Factors: Necrotic Foot  Clinical Indicators: Right foot is black and necrotic wound. In ED lab workup   reveals patient's initial wbc level was 18 k/uL, Initial lactic acid level was   1.6 mmol/L. In ED workup shows patient have necrotizing fascitis.  In    MD progress note states patient have Gram negative ( Enterobacterales )   bacteremia and Shock 2/2 most likely hemorrhagic in the setting of procedure   versus bleeding from the ulcers vs sepsis. Surgical cultures are growing   Proteus, Morganella, Enterococcus, Streptococcus agalactiae and   Staphylococcus.  Treatment:  TKA, IV antibiotics, ID consult,    Thank You  BERNADETTE Sanchez, RN  Clinical Documentation Integrity  Options provided:  -- Sepsis present, POA,  as evidenced by, Please document evidence.  -- Sepsis was ruled out after study  -- Other - I will add my own diagnosis  -- Disagree - Not applicable / Not valid  -- Disagree - Clinically unable to determine / Unknown  -- Refer to Clinical Documentation Reviewer    PROVIDER RESPONSE TEXT:    Sepsis was ruled out after study.    Query created by: Alfredo Benitez on 3/12/2024 12:55 PM      Electronically signed by:  Karma Tilley DO 3/12/2024 1:11 PM           [NL] : warm, clear

## 2024-03-25 ENCOUNTER — ANESTHESIA EVENT (OUTPATIENT)
Dept: OPERATING ROOM | Age: 69
End: 2024-03-25
Payer: MEDICARE

## 2024-03-25 ENCOUNTER — PREP FOR PROCEDURE (OUTPATIENT)
Dept: VASCULAR SURGERY | Age: 69
End: 2024-03-25

## 2024-03-25 RX ORDER — PANTOPRAZOLE SODIUM 40 MG/1
40 TABLET, DELAYED RELEASE ORAL DAILY
Status: ON HOLD | COMMUNITY

## 2024-03-25 RX ORDER — MEROPENEM 1 G/1
1000 INJECTION, POWDER, FOR SOLUTION INTRAVENOUS EVERY 8 HOURS
Status: ON HOLD | COMMUNITY

## 2024-03-25 RX ORDER — DOCUSATE SODIUM 100 MG/1
100 CAPSULE, LIQUID FILLED ORAL 2 TIMES DAILY
Status: ON HOLD | COMMUNITY

## 2024-03-25 RX ORDER — LEVOTHYROXINE SODIUM 0.05 MG/1
50 TABLET ORAL DAILY
Status: ON HOLD | COMMUNITY

## 2024-03-25 RX ORDER — HEPARIN SODIUM 5000 [USP'U]/ML
5000 INJECTION, SOLUTION INTRAVENOUS; SUBCUTANEOUS EVERY 8 HOURS SCHEDULED
Status: ON HOLD | COMMUNITY

## 2024-03-25 RX ORDER — CHOLECALCIFEROL (VITAMIN D3) 1250 MCG
50000 CAPSULE ORAL WEEKLY
Status: ON HOLD | COMMUNITY

## 2024-03-25 NOTE — PROGRESS NOTES
[facility]   PRE-ADMISSION TESTING GENERAL INSTRUCTIONS  PAT Phone Number: [phone]      GENERAL INSTRUCTIONS:    [x] Antibacterial Soap Shower Night before or AM of surgery.  [x] Do not wear makeup, lotions, powders, deodorant the morning of surgery.  [x] Nothing to eat or drink after midnight. This includes no gum, candy, mints or water.  [x] You may brush your teeth, gargle, but do not swallow water.   [x] No tobacco products, illegal drugs, or alcohol within 24 hours of your surgery.  [x] Jewelry or valuables should not be brought to the hospital. All body and/or tongue piercing's must be removed prior to arriving to hospital. No contact lens or hair pins.      PARKING INSTRUCTIONS:     [x] ARRIVAL DATE & TIME: 3/26/24 at 0900  [x] Times are subject to change. We will contact you the business day before surgery if that were to occur.    MEDICATION INSTRUCTIONS:    [x] Bring a complete list of your medications, please write the last time you took the medicine, give this list to the nurse in Pre-Op.  [x] Take ONLY the following medications the morning of surgery: protonix  [x] Stop all herbal supplements and vitamins 5 days before surgery. Stop NSAIDS 7 days before surgery.  [x] Follow physician instructions regarding any blood thinners you may be taking. No heparin day of surgery.

## 2024-03-26 ENCOUNTER — ANESTHESIA (OUTPATIENT)
Dept: OPERATING ROOM | Age: 69
End: 2024-03-26
Payer: MEDICARE

## 2024-03-26 ENCOUNTER — HOSPITAL ENCOUNTER (OUTPATIENT)
Age: 69
Setting detail: OUTPATIENT SURGERY
Discharge: OTHER INSTITUTION | End: 2024-03-26
Attending: SURGERY | Admitting: SURGERY
Payer: MEDICARE

## 2024-03-26 VITALS
HEIGHT: 71 IN | HEART RATE: 76 BPM | BODY MASS INDEX: 27.86 KG/M2 | OXYGEN SATURATION: 94 % | DIASTOLIC BLOOD PRESSURE: 82 MMHG | TEMPERATURE: 97.4 F | WEIGHT: 199 LBS | SYSTOLIC BLOOD PRESSURE: 151 MMHG | RESPIRATION RATE: 20 BRPM

## 2024-03-26 PROCEDURE — 3700000000 HC ANESTHESIA ATTENDED CARE: Performed by: SURGERY

## 2024-03-26 PROCEDURE — 3700000001 HC ADD 15 MINUTES (ANESTHESIA): Performed by: SURGERY

## 2024-03-26 PROCEDURE — 6370000000 HC RX 637 (ALT 250 FOR IP): Performed by: SURGERY

## 2024-03-26 PROCEDURE — 3600000002 HC SURGERY LEVEL 2 BASE: Performed by: SURGERY

## 2024-03-26 PROCEDURE — 3600000012 HC SURGERY LEVEL 2 ADDTL 15MIN: Performed by: SURGERY

## 2024-03-26 PROCEDURE — 2580000003 HC RX 258

## 2024-03-26 PROCEDURE — C1713 ANCHOR/SCREW BN/BN,TIS/BN: HCPCS | Performed by: SURGERY

## 2024-03-26 PROCEDURE — 2709999900 HC NON-CHARGEABLE SUPPLY: Performed by: SURGERY

## 2024-03-26 PROCEDURE — 2500000003 HC RX 250 WO HCPCS: Performed by: ANESTHESIOLOGY

## 2024-03-26 PROCEDURE — 6360000002 HC RX W HCPCS

## 2024-03-26 PROCEDURE — 7100000001 HC PACU RECOVERY - ADDTL 15 MIN: Performed by: SURGERY

## 2024-03-26 PROCEDURE — 7100000000 HC PACU RECOVERY - FIRST 15 MIN: Performed by: SURGERY

## 2024-03-26 DEVICE — FISH-SKIN GRAFT FOR SURGICAL USE. KERECIS® SURGICLOSE® IS INDICATED FOR THE MANAGEMENT OF WOUNDS INCLUDING: PARTIAL AND FULL THICKNESS WOUNDS, PRESSURE ULCERS, CHRONIC VASCULAR ULCERS, DIABETIC ULCERS, TRAUMA WOUNDS (ABRASIONS, LACERATIONS, SECOND-DEGREE BURNS, SKIN TEARS), SURGICAL WOUNDS (DONOR SITE/GRAFTS, POST-MOHS SURGERY, POST-LASER SURGERY, PODIATRIC, WOUND DEHISCENCE) AND DRAINING WOUNDS.IFU: HTTPS://WWW.KERECIS.COM/IFUS/IFU-KERECIS-SURGICLOSE/
Type: IMPLANTABLE DEVICE | Site: LEG | Status: FUNCTIONAL
Brand: KERECIS® SURGICLOSE®

## 2024-03-26 RX ORDER — HYDROMORPHONE HYDROCHLORIDE 1 MG/ML
0.5 INJECTION, SOLUTION INTRAMUSCULAR; INTRAVENOUS; SUBCUTANEOUS EVERY 5 MIN PRN
Status: DISCONTINUED | OUTPATIENT
Start: 2024-03-26 | End: 2024-03-26 | Stop reason: HOSPADM

## 2024-03-26 RX ORDER — NALOXONE HYDROCHLORIDE 0.4 MG/ML
INJECTION, SOLUTION INTRAMUSCULAR; INTRAVENOUS; SUBCUTANEOUS PRN
Status: DISCONTINUED | OUTPATIENT
Start: 2024-03-26 | End: 2024-03-26 | Stop reason: HOSPADM

## 2024-03-26 RX ORDER — PROPOFOL 10 MG/ML
INJECTION, EMULSION INTRAVENOUS CONTINUOUS PRN
Status: DISCONTINUED | OUTPATIENT
Start: 2024-03-26 | End: 2024-03-26 | Stop reason: SDUPTHER

## 2024-03-26 RX ORDER — SODIUM CHLORIDE 9 MG/ML
INJECTION, SOLUTION INTRAVENOUS PRN
Status: DISCONTINUED | OUTPATIENT
Start: 2024-03-26 | End: 2024-03-26 | Stop reason: HOSPADM

## 2024-03-26 RX ORDER — LIDOCAINE HYDROCHLORIDE 20 MG/ML
JELLY TOPICAL PRN
Status: DISCONTINUED | OUTPATIENT
Start: 2024-03-26 | End: 2024-03-26 | Stop reason: ALTCHOICE

## 2024-03-26 RX ORDER — MEPERIDINE HYDROCHLORIDE 25 MG/ML
12.5 INJECTION INTRAMUSCULAR; INTRAVENOUS; SUBCUTANEOUS
Status: DISCONTINUED | OUTPATIENT
Start: 2024-03-26 | End: 2024-03-26 | Stop reason: HOSPADM

## 2024-03-26 RX ORDER — HYDROMORPHONE HYDROCHLORIDE 1 MG/ML
0.25 INJECTION, SOLUTION INTRAMUSCULAR; INTRAVENOUS; SUBCUTANEOUS EVERY 5 MIN PRN
Status: DISCONTINUED | OUTPATIENT
Start: 2024-03-26 | End: 2024-03-26 | Stop reason: HOSPADM

## 2024-03-26 RX ORDER — SODIUM CHLORIDE 0.9 % (FLUSH) 0.9 %
5-40 SYRINGE (ML) INJECTION EVERY 12 HOURS SCHEDULED
Status: DISCONTINUED | OUTPATIENT
Start: 2024-03-26 | End: 2024-03-26 | Stop reason: HOSPADM

## 2024-03-26 RX ORDER — SODIUM CHLORIDE 0.9 % (FLUSH) 0.9 %
5-40 SYRINGE (ML) INJECTION PRN
Status: DISCONTINUED | OUTPATIENT
Start: 2024-03-26 | End: 2024-03-26 | Stop reason: HOSPADM

## 2024-03-26 RX ORDER — ONDANSETRON 2 MG/ML
4 INJECTION INTRAMUSCULAR; INTRAVENOUS
Status: DISCONTINUED | OUTPATIENT
Start: 2024-03-26 | End: 2024-03-26 | Stop reason: HOSPADM

## 2024-03-26 RX ORDER — MIDAZOLAM HYDROCHLORIDE 1 MG/ML
INJECTION INTRAMUSCULAR; INTRAVENOUS PRN
Status: DISCONTINUED | OUTPATIENT
Start: 2024-03-26 | End: 2024-03-26 | Stop reason: SDUPTHER

## 2024-03-26 RX ORDER — SODIUM CHLORIDE 9 MG/ML
INJECTION, SOLUTION INTRAVENOUS CONTINUOUS
Status: DISCONTINUED | OUTPATIENT
Start: 2024-03-26 | End: 2024-03-26 | Stop reason: HOSPADM

## 2024-03-26 RX ORDER — FENTANYL CITRATE 50 UG/ML
INJECTION, SOLUTION INTRAMUSCULAR; INTRAVENOUS PRN
Status: DISCONTINUED | OUTPATIENT
Start: 2024-03-26 | End: 2024-03-26 | Stop reason: SDUPTHER

## 2024-03-26 RX ORDER — CEFAZOLIN SODIUM 1 G/3ML
INJECTION, POWDER, FOR SOLUTION INTRAMUSCULAR; INTRAVENOUS PRN
Status: DISCONTINUED | OUTPATIENT
Start: 2024-03-26 | End: 2024-03-26 | Stop reason: SDUPTHER

## 2024-03-26 RX ADMIN — FENTANYL CITRATE 25 MCG: 50 INJECTION, SOLUTION INTRAMUSCULAR; INTRAVENOUS at 11:43

## 2024-03-26 RX ADMIN — MIDAZOLAM 0.5 MG: 1 INJECTION INTRAMUSCULAR; INTRAVENOUS at 11:18

## 2024-03-26 RX ADMIN — PROPOFOL 150 MCG/KG/MIN: 10 INJECTION, EMULSION INTRAVENOUS at 10:59

## 2024-03-26 RX ADMIN — SODIUM CHLORIDE: 9 INJECTION, SOLUTION INTRAVENOUS at 10:55

## 2024-03-26 RX ADMIN — FENTANYL CITRATE 25 MCG: 50 INJECTION, SOLUTION INTRAMUSCULAR; INTRAVENOUS at 11:19

## 2024-03-26 RX ADMIN — FENTANYL CITRATE 25 MCG: 50 INJECTION, SOLUTION INTRAMUSCULAR; INTRAVENOUS at 11:00

## 2024-03-26 RX ADMIN — HYDROMORPHONE HYDROCHLORIDE 0.5 MG: 1 INJECTION, SOLUTION INTRAMUSCULAR; INTRAVENOUS; SUBCUTANEOUS at 12:29

## 2024-03-26 RX ADMIN — CEFAZOLIN 2 G: 1 INJECTION, POWDER, FOR SOLUTION INTRAMUSCULAR; INTRAVENOUS at 11:00

## 2024-03-26 RX ADMIN — MIDAZOLAM 1 MG: 1 INJECTION INTRAMUSCULAR; INTRAVENOUS at 10:56

## 2024-03-26 RX ADMIN — SODIUM CHLORIDE: 9 INJECTION, SOLUTION INTRAVENOUS at 10:27

## 2024-03-26 ASSESSMENT — PAIN - FUNCTIONAL ASSESSMENT: PAIN_FUNCTIONAL_ASSESSMENT: 0-10

## 2024-03-26 ASSESSMENT — PAIN DESCRIPTION - LOCATION: LOCATION: LEG

## 2024-03-26 ASSESSMENT — PAIN SCALES - GENERAL
PAINLEVEL_OUTOF10: 3
PAINLEVEL_OUTOF10: 4
PAINLEVEL_OUTOF10: 10

## 2024-03-26 ASSESSMENT — PAIN DESCRIPTION - ORIENTATION: ORIENTATION: LEFT;LOWER

## 2024-03-26 ASSESSMENT — LIFESTYLE VARIABLES: SMOKING_STATUS: 1

## 2024-03-26 NOTE — OP NOTE
Operative Note      Patient: Navin Tran  YOB: 1955  MRN: [id number]    Date of Procedure: 3/26/2024    Preop Dx  L LE wounds    Post-Op Diagnosis: Same       Procedure  L LE heel and calf debridement  Application of 10x7 kerecis meshed    L heel 5 cm width x 5 cm length x 0.5 cm depth  L lateral calf 10 cm width x 17 cm length x 0.3 cm depth    Surgeon(s):  Katerina Wild MD    Assistant:   Resident: Micheal Rea DO    Anesthesia: Monitor Anesthesia Care    Estimated Blood Loss (mL): Minimal    Complications: None    Specimens:   * No specimens in log *    Implants:  Implant Name Type Inv. Item Serial No.  Lot No. LRB No. Used Action   KERECIS SURGICLOSE MESHED 2:1     [id number] Left 1 Implanted         Drains:   Negative Pressure Wound Therapy Leg Lower;Proximal;Right;Anterior (Active)   Wound Type Surgical 03/04/24 0800   Target Pressure (mmHg) 125 03/03/24 1700   Dressing Status Clean, dry & intact 03/03/24 1700   Drainage Amount None 03/03/24 1700   Output (ml) 0 ml 03/03/24 1400       [REMOVED] NG/OG/NJ/NE Tube Center mouth (Removed)   Surrounding Skin Clean, dry & intact 03/03/24 1524   Securement device Tape 03/03/24 1524   Status Clamped 03/03/24 1524   Placement Verified X-Ray (repeat) 03/03/24 1524   NG/OG/NJ/NE External Measurement (cm) 55 cm 03/03/24 1524   Drainage Appearance None 03/03/24 1524   Tube Feeding Immune Enhancing 03/01/24 1845   Tube feeding/verify rate (mL/hr) 0 mL/hr 03/03/24 0446   Tube Feeding Intake (mL) 210 ml 03/03/24 0446   Free Water/Flush (mL) 60 mL 03/03/24 0446   Residual Volume (ml) 0 ml 03/02/24 1400       [REMOVED] Urinary Catheter 02/27/24 (Removed)   Catheter Indications Need for fluid volume management of the critically ill patient in a critical care setting 03/04/24 2000   Site Assessment Pink 03/04/24 2000   Urine Color Yellow 03/04/24 2000   Urine Appearance Clear 03/04/24 2000   Collection

## 2024-03-26 NOTE — H&P
VASCULAR SURGERY  HISTORY AND PHYSICAL  3/26/2024  Chief complaint: Lower extremity tissue loss      HPI  Navin Tran is a 68 y.o. male who presents for evaluation of left lower extremity wounds.  Patient was recently hospitalized for right lower extremity necrotizing fasciitis and underwent right below the knee amputation.  Patient states his only past medical history is pertinent for hypothyroidism and he was inconsistent with taking his medications.  Denies any blood thinners.      Past Medical History:   Diagnosis Date    Bacteremia     Hypertension        Past Surgical History:   Procedure Laterality Date    LEG AMPUTATION BELOW KNEE Right 2/27/2024    RIGHT LOWER EXTREMITY GUILLOTIN AMPUTATION performed by Katerina Wild MD at [facility] [place]    LEG AMPUTATION BELOW KNEE Bilateral 3/1/2024    BELOW KNEE AMPUTATION RIGHT LEG, LEFT LEG HEEL AND CALF DEBRIDMENT performed by Katerina Wild MD at [facility] [place]    LEG SURGERY Left 2/27/2024    LEFT LOWER LEG DEBRIDEMENT DOWN TO BONE INCISION AND DRAINAGE performed by Kaetrina Wild MD at [facility] [place]       Prior to Admission medications    Medication Sig Start Date End Date Taking? Authorizing Provider   vitamin D (VITAMIN D3) 88127 UNIT CAPS Take 1 capsule by mouth once a week   Yes Denise Daniel MD   docusate sodium (COLACE) 100 MG capsule Take 1 capsule by mouth 2 times daily   Yes Denise Daniel MD   heparin, porcine, 5000 UNIT/ML injection Inject 1 mL into the skin every 8 hours   Yes Denise Daniel MD   levothyroxine (SYNTHROID) 50 MCG tablet Take 1 tablet by mouth Daily   Yes Denise Daniel MD   meropenem (MERREM) 1 g injection Infuse 1,000 mg intravenously in the morning and 1,000 mg at noon and 1,000 mg in the evening.   Yes Denise Daniel MD   pantoprazole (PROTONIX) 40 MG tablet Take 1 tablet by mouth daily   Yes Denise Daniel MD   Vancomycin HCl in Dextrose (VANCOCIN HCL IV) Infuse intravenously

## 2024-03-26 NOTE — ANESTHESIA POSTPROCEDURE EVALUATION
Department of Anesthesiology  Postprocedure Note    Patient: Navin Tran  MRN: [id number]  YOB: 1955  Date of evaluation: 3/26/2024    Procedure Summary       Date: 03/26/24 Room / Location: [address]    Anesthesia Start: 1056 Anesthesia Stop: 1154    Procedure: LEFT LOWER EXTREMITY WOUND DEBRIDEMENT WITH ADVANCED SKIN THERAPY (Left) Diagnosis:       Necrotizing fasciitis (HCC)      (Necrotizing fasciitis (HCC) [M72.6])    Surgeons: Katerina Wild MD Responsible Provider: Maximo Ye DO    Anesthesia Type: MAC ASA Status: 3            Anesthesia Type: MAC    Malika Phase I: Malika Score: 10    Malika Phase II:      Anesthesia Post Evaluation    Patient location during evaluation: bedside  Patient participation: complete - patient cannot participate  Level of consciousness: awake and alert  Airway patency: patent  Nausea & Vomiting: no nausea and no vomiting  Cardiovascular status: blood pressure returned to baseline  Respiratory status: acceptable  Hydration status: euvolemic  Multimodal analgesia pain management approach    No notable events documented.

## 2024-03-26 NOTE — ANESTHESIA PRE PROCEDURE
Department of Anesthesiology  Preprocedure Note       Name:  Navin Tran   Age:  68 y.o.  :  1955                                          MRN:  [id number]         Date:  3/26/2024      Surgeon: Surgeon(s):  Katerina Wild MD    Procedure: Procedure(s):  DEBRIDEMENT LEFT LEG, POSSIBLE ADVANCED SKIN THERAPY    Medications prior to admission:   Prior to Admission medications    Medication Sig Start Date End Date Taking? Authorizing Provider   vitamin D (VITAMIN D3) 96244 UNIT CAPS Take 1 capsule by mouth once a week   Yes Denise Daniel MD   docusate sodium (COLACE) 100 MG capsule Take 1 capsule by mouth 2 times daily   Yes Denise Daniel MD   heparin, porcine, 5000 UNIT/ML injection Inject 1 mL into the skin every 8 hours   Yes Denise Daniel MD   levothyroxine (SYNTHROID) 50 MCG tablet Take 1 tablet by mouth Daily   Yes Denise Daniel MD   meropenem (MERREM) 1 g injection Infuse 1,000 mg intravenously in the morning and 1,000 mg at noon and 1,000 mg in the evening.   Yes Denise Daniel MD   pantoprazole (PROTONIX) 40 MG tablet Take 1 tablet by mouth daily   Yes Denise Daniel MD   Vancomycin HCl in Dextrose (VANCOCIN HCL IV) Infuse intravenously daily   Yes ProviderDenise MD       Current medications:    Current Facility-Administered Medications   Medication Dose Route Frequency Provider Last Rate Last Admin    0.9 % sodium chloride infusion   IntraVENous Continuous Jesús Hackett APRN - CNP        sodium chloride flush 0.9 % injection 5-40 mL  5-40 mL IntraVENous 2 times per day Jesús Hackett APRN - CNP        sodium chloride flush 0.9 % injection 5-40 mL  5-40 mL IntraVENous PRN Jesús Hackett APRN - CNP        0.9 % sodium chloride infusion   IntraVENous PRN Jesús Hackett APRN - CNP        ceFAZolin (ANCEF) 2,000 mg in sterile water 20 mL IV syringe  2,000 mg IntraVENous On Call to OR Jesús Hackett APRN - CNP           Allergies:

## 2024-04-15 ENCOUNTER — TELEPHONE (OUTPATIENT)
Dept: VASCULAR SURGERY | Age: 69
End: 2024-04-15

## 2024-04-15 DIAGNOSIS — Z89.511 HX OF BKA, RIGHT (HCC): Primary | ICD-10-CM

## 2024-04-19 NOTE — DISCHARGE INSTRUCTIONS
Visit Discharge/Physician Orders    Discharge condition: Stable    Assessment of pain at discharge:yes     Anesthetic used: 4% Lido Soln    Discharge to: Home    Left via:Private automobile    Accompanied by: accompanied by family    ECF/HHA: [facility]     Dressing Orders:LEFT HEEL: Cleanse with normal saline, calcium alginate, apply Coban 2. Change M-W(@[facility])/F  LEFT LATERAL LEG: Cleanse with normal saline, calcium alginate, apply Coban 2. Change M-W(@[facility])/F    *Do not wet calcium alginate when removing*     Treatment Orders: FOLLOW NUTRITIOUS DIET. CHOOSE FOODS HIGH IN PROTEIN -CHICKEN- FISH-AND EGGS,  CHOOSE FOODS HIGH IN VITAMIN C.   MULTIVITAMIN DAILY.      STOP SMOKING    Non-weight bearing left heel.     [facility] followup visit ____1 wk Dr. IZAGUIRRE_________________________  (Please note your next appointment above and if you are unable to keep, kindly give a 24 hour notice. Thank you.)    Physician signature:__________________________      If you experience any of the following, please call the Wound Care Center during business hours:    * Increase in Pain  * Temperature over 101  * Increase in drainage from your wound  * Drainage with a foul odor  * Bleeding  * Increase in swelling  * Need for compression bandage changes due to slippage, breakthrough drainage.    If you need medical attention outside of the business hours of the Wound Care Centers please contact your PCP or go to the nearest emergency room.

## 2024-04-24 ENCOUNTER — HOSPITAL ENCOUNTER (OUTPATIENT)
Dept: WOUND CARE | Age: 69
Discharge: HOME | End: 2024-04-24
Payer: MEDICARE

## 2024-04-24 VITALS
RESPIRATION RATE: 18 BRPM | TEMPERATURE: 97.7 F | SYSTOLIC BLOOD PRESSURE: 144 MMHG | HEART RATE: 80 BPM | DIASTOLIC BLOOD PRESSURE: 83 MMHG

## 2024-04-24 DIAGNOSIS — L89.624 PRESSURE INJURY OF LEFT HEEL, STAGE 4 (HCC): Chronic | ICD-10-CM

## 2024-04-24 DIAGNOSIS — I87.2 VENOUS STASIS ULCER OF LEFT CALF WITH FAT LAYER EXPOSED WITHOUT VARICOSE VEINS (HCC): Primary | ICD-10-CM

## 2024-04-24 DIAGNOSIS — L97.222 VENOUS STASIS ULCER OF LEFT CALF WITH FAT LAYER EXPOSED WITHOUT VARICOSE VEINS (HCC): Primary | ICD-10-CM

## 2024-04-24 PROCEDURE — 11045 DBRDMT SUBQ TISS EACH ADDL: CPT

## 2024-04-24 PROCEDURE — 99214 OFFICE O/P EST MOD 30 MIN: CPT

## 2024-04-24 PROCEDURE — 11042 DBRDMT SUBQ TIS 1ST 20SQCM/<: CPT

## 2024-04-24 RX ORDER — OXYCODONE HYDROCHLORIDE 5 MG/1
5 TABLET ORAL EVERY 6 HOURS PRN
Qty: 28 TABLET | Refills: 0 | Status: SHIPPED | OUTPATIENT
Start: 2024-04-24 | End: 2024-05-01

## 2024-04-24 RX ORDER — LIDOCAINE HYDROCHLORIDE 20 MG/ML
JELLY TOPICAL ONCE
OUTPATIENT
Start: 2024-04-24 | End: 2024-04-24

## 2024-04-24 RX ORDER — TRIAMCINOLONE ACETONIDE 1 MG/G
OINTMENT TOPICAL ONCE
OUTPATIENT
Start: 2024-04-24 | End: 2024-04-24

## 2024-04-24 RX ORDER — LIDOCAINE 40 MG/G
CREAM TOPICAL ONCE
OUTPATIENT
Start: 2024-04-24 | End: 2024-04-24

## 2024-04-24 RX ORDER — BACITRACIN ZINC AND POLYMYXIN B SULFATE 500; 1000 [USP'U]/G; [USP'U]/G
OINTMENT TOPICAL ONCE
OUTPATIENT
Start: 2024-04-24 | End: 2024-04-24

## 2024-04-24 RX ORDER — LIDOCAINE HYDROCHLORIDE 40 MG/ML
SOLUTION TOPICAL ONCE
OUTPATIENT
Start: 2024-04-24 | End: 2024-04-24

## 2024-04-24 RX ORDER — BETAMETHASONE DIPROPIONATE 0.5 MG/G
CREAM TOPICAL ONCE
OUTPATIENT
Start: 2024-04-24 | End: 2024-04-24

## 2024-04-24 RX ORDER — GENTAMICIN SULFATE 1 MG/G
OINTMENT TOPICAL ONCE
OUTPATIENT
Start: 2024-04-24 | End: 2024-04-24

## 2024-04-24 RX ORDER — SODIUM CHLOR/HYPOCHLOROUS ACID 0.033 %
SOLUTION, IRRIGATION IRRIGATION ONCE
OUTPATIENT
Start: 2024-04-24 | End: 2024-04-24

## 2024-04-24 RX ORDER — ACETAMINOPHEN 325 MG/1
650 TABLET ORAL EVERY 6 HOURS PRN
COMMUNITY
Start: 2024-04-19

## 2024-04-24 RX ORDER — CLOBETASOL PROPIONATE 0.5 MG/G
OINTMENT TOPICAL ONCE
OUTPATIENT
Start: 2024-04-24 | End: 2024-04-24

## 2024-04-24 RX ORDER — IBUPROFEN 200 MG
TABLET ORAL ONCE
OUTPATIENT
Start: 2024-04-24 | End: 2024-04-24

## 2024-04-24 RX ORDER — OXYCODONE HYDROCHLORIDE 5 MG/1
5 TABLET ORAL EVERY 4 HOURS PRN
COMMUNITY
Start: 2024-04-19

## 2024-04-24 RX ORDER — BACITRACIN ZINC 500 [USP'U]/G
OINTMENT TOPICAL ONCE
OUTPATIENT
Start: 2024-04-24 | End: 2024-04-24

## 2024-04-24 RX ORDER — LIDOCAINE HYDROCHLORIDE 40 MG/ML
SOLUTION TOPICAL ONCE
Status: DISCONTINUED | OUTPATIENT
Start: 2024-04-24 | End: 2024-04-25 | Stop reason: HOSPADM

## 2024-04-24 RX ORDER — LIDOCAINE 50 MG/G
OINTMENT TOPICAL ONCE
OUTPATIENT
Start: 2024-04-24 | End: 2024-04-24

## 2024-04-24 ASSESSMENT — PAIN DESCRIPTION - DESCRIPTORS: DESCRIPTORS: THROBBING

## 2024-04-24 ASSESSMENT — PAIN DESCRIPTION - ORIENTATION: ORIENTATION: LEFT;LOWER

## 2024-04-24 ASSESSMENT — PAIN DESCRIPTION - LOCATION: LOCATION: LEG

## 2024-04-24 ASSESSMENT — PAIN DESCRIPTION - ONSET: ONSET: ON-GOING

## 2024-04-24 ASSESSMENT — PAIN DESCRIPTION - PAIN TYPE: TYPE: CHRONIC PAIN

## 2024-04-24 ASSESSMENT — PAIN DESCRIPTION - FREQUENCY: FREQUENCY: CONTINUOUS

## 2024-04-24 ASSESSMENT — PAIN - FUNCTIONAL ASSESSMENT: PAIN_FUNCTIONAL_ASSESSMENT: PREVENTS OR INTERFERES SOME ACTIVE ACTIVITIES AND ADLS

## 2024-04-24 ASSESSMENT — PAIN SCALES - GENERAL: PAINLEVEL_OUTOF10: 8

## 2024-04-24 NOTE — PROGRESS NOTES
alginate, apply Coban 2. Change M-W(@[facility])/F    *Do not wet calcium alginate when removing*     Treatment Orders: FOLLOW NUTRITIOUS DIET. CHOOSE FOODS HIGH IN PROTEIN -CHICKEN- FISH-AND EGGS,  CHOOSE FOODS HIGH IN VITAMIN C.   MULTIVITAMIN DAILY.      STOP SMOKING    Non-weight bearing left heel.     [facility] followup visit ____1 wk Dr. IZAGUIRRE_________________________  (Please note your next appointment above and if you are unable to keep, kindly give a 24 hour notice. Thank you.)    Physician signature:__________________________      If you experience any of the following, please call the Wound Care Center during business hours:    * Increase in Pain  * Temperature over 101  * Increase in drainage from your wound  * Drainage with a foul odor  * Bleeding  * Increase in swelling  * Need for compression bandage changes due to slippage, breakthrough drainage.    If you need medical attention outside of the business hours of the Wound Care Centers please contact your PCP or go to the nearest emergency room.        Electronically signed by Katerina Wild MD on 4/24/2024 at 10:59 AM

## 2024-04-24 NOTE — PLAN OF CARE
Problem: Pain  Goal: Verbalizes/displays adequate comfort level or baseline comfort level  Outcome: Progressing     Problem: Cognitive:  Goal: Knowledge of wound care  Description: Knowledge of wound care  Outcome: Progressing  Goal: Understands risk factors for wounds  Description: Understands risk factors for wounds  Outcome: Progressing     Problem: Wound:  Goal: Will show signs of wound healing; wound closure and no evidence of infection  Description: Will show signs of wound healing; wound closure and no evidence of infection  Outcome: Progressing     Problem: Venous:  Goal: Signs of wound healing will improve  Description: Signs of wound healing will improve  Outcome: Progressing     Problem: Smoking cessation:  Goal: Ability to formulate a plan to maintain a tobacco-free life will be supported  Description: Ability to formulate a plan to maintain a tobacco-free life will be supported  Outcome: Progressing

## 2024-04-25 NOTE — DISCHARGE INSTRUCTIONS
Visit Discharge/Physician Orders     Discharge condition: Stable     Assessment of pain at discharge:yes      Anesthetic used: 4% Lido Soln     Discharge to: Home     Left via:Private automobile     Accompanied by: accompanied by family     ECF/HHA: [facility]      Dressing Orders:LEFT HEEL: Cleanse with normal saline, calcium alginate, apply Coban 2. Change M-W(@[facility])/F  LEFT LATERAL LEG: Cleanse with normal saline, calcium alginate, apply Coban 2. Change M-W(@[facility])/F     IN CLINIC: LEFT LATERAL HEEL AND LEG: Cleanse with normal saline, calcium alginate, apply Profore.     *Do not wet calcium alginate when removing*      Treatment Orders: FOLLOW NUTRITIOUS DIET. CHOOSE FOODS HIGH IN PROTEIN -CHICKEN- FISH-AND EGGS,  CHOOSE FOODS HIGH IN VITAMIN C.   MULTIVITAMIN DAILY.       STOP SMOKING     Non-weight bearing left heel.      [facility] followup visit ____1 wk Dr. IZAGUIRRE_________________________  (Please note your next appointment above and if you are unable to keep, kindly give a 24 hour notice. Thank you.)     Physician signature:__________________________        If you experience any of the following, please call the Wound Care Center during business hours:     * Increase in Pain  * Temperature over 101  * Increase in drainage from your wound  * Drainage with a foul odor  * Bleeding  * Increase in swelling  * Need for compression bandage changes due to slippage, breakthrough drainage.     If you need medical attention outside of the business hours of the Wound Care Centers please contact your PCP or go to the nearest emergency room.

## 2024-05-01 ENCOUNTER — HOSPITAL ENCOUNTER (OUTPATIENT)
Dept: WOUND CARE | Age: 69
Discharge: HOME | End: 2024-05-01
Payer: MEDICARE

## 2024-05-01 VITALS
BODY MASS INDEX: 30.8 KG/M2 | SYSTOLIC BLOOD PRESSURE: 141 MMHG | TEMPERATURE: 96.7 F | RESPIRATION RATE: 18 BRPM | DIASTOLIC BLOOD PRESSURE: 71 MMHG | WEIGHT: 220 LBS | HEART RATE: 89 BPM | HEIGHT: 71 IN

## 2024-05-01 DIAGNOSIS — L89.624 PRESSURE INJURY OF LEFT HEEL, STAGE 4 (HCC): Primary | ICD-10-CM

## 2024-05-01 DIAGNOSIS — I87.2 VENOUS STASIS ULCER OF LEFT CALF WITH FAT LAYER EXPOSED WITHOUT VARICOSE VEINS (HCC): ICD-10-CM

## 2024-05-01 DIAGNOSIS — L97.222 VENOUS STASIS ULCER OF LEFT CALF WITH FAT LAYER EXPOSED WITHOUT VARICOSE VEINS (HCC): ICD-10-CM

## 2024-05-01 PROCEDURE — 11042 DBRDMT SUBQ TIS 1ST 20SQCM/<: CPT | Performed by: SURGERY

## 2024-05-01 PROCEDURE — 11045 DBRDMT SUBQ TISS EACH ADDL: CPT

## 2024-05-01 PROCEDURE — 11042 DBRDMT SUBQ TIS 1ST 20SQCM/<: CPT

## 2024-05-01 PROCEDURE — 11045 DBRDMT SUBQ TISS EACH ADDL: CPT | Performed by: SURGERY

## 2024-05-01 RX ORDER — LIDOCAINE HYDROCHLORIDE 20 MG/ML
JELLY TOPICAL ONCE
OUTPATIENT
Start: 2024-05-01 | End: 2024-05-01

## 2024-05-01 RX ORDER — IBUPROFEN 200 MG
TABLET ORAL ONCE
OUTPATIENT
Start: 2024-05-01 | End: 2024-05-01

## 2024-05-01 RX ORDER — GENTAMICIN SULFATE 1 MG/G
OINTMENT TOPICAL ONCE
OUTPATIENT
Start: 2024-05-01 | End: 2024-05-01

## 2024-05-01 RX ORDER — TRIAMCINOLONE ACETONIDE 1 MG/G
OINTMENT TOPICAL ONCE
OUTPATIENT
Start: 2024-05-01 | End: 2024-05-01

## 2024-05-01 RX ORDER — CLOBETASOL PROPIONATE 0.5 MG/G
OINTMENT TOPICAL ONCE
OUTPATIENT
Start: 2024-05-01 | End: 2024-05-01

## 2024-05-01 RX ORDER — LIDOCAINE 40 MG/G
CREAM TOPICAL ONCE
OUTPATIENT
Start: 2024-05-01 | End: 2024-05-01

## 2024-05-01 RX ORDER — BACITRACIN ZINC 500 [USP'U]/G
OINTMENT TOPICAL ONCE
OUTPATIENT
Start: 2024-05-01 | End: 2024-05-01

## 2024-05-01 RX ORDER — SODIUM CHLOR/HYPOCHLOROUS ACID 0.033 %
SOLUTION, IRRIGATION IRRIGATION ONCE
OUTPATIENT
Start: 2024-05-01 | End: 2024-05-01

## 2024-05-01 RX ORDER — LIDOCAINE HYDROCHLORIDE 40 MG/ML
SOLUTION TOPICAL ONCE
Status: COMPLETED | OUTPATIENT
Start: 2024-05-01 | End: 2024-05-01

## 2024-05-01 RX ORDER — BETAMETHASONE DIPROPIONATE 0.5 MG/G
CREAM TOPICAL ONCE
OUTPATIENT
Start: 2024-05-01 | End: 2024-05-01

## 2024-05-01 RX ORDER — LIDOCAINE HYDROCHLORIDE 40 MG/ML
SOLUTION TOPICAL ONCE
OUTPATIENT
Start: 2024-05-01 | End: 2024-05-01

## 2024-05-01 RX ORDER — BACITRACIN ZINC AND POLYMYXIN B SULFATE 500; 1000 [USP'U]/G; [USP'U]/G
OINTMENT TOPICAL ONCE
OUTPATIENT
Start: 2024-05-01 | End: 2024-05-01

## 2024-05-01 RX ORDER — LIDOCAINE 50 MG/G
OINTMENT TOPICAL ONCE
OUTPATIENT
Start: 2024-05-01 | End: 2024-05-01

## 2024-05-01 RX ORDER — IBUPROFEN 600 MG/1
600 TABLET ORAL EVERY 6 HOURS PRN
COMMUNITY

## 2024-05-01 RX ADMIN — LIDOCAINE HYDROCHLORIDE 10 ML: 40 SOLUTION TOPICAL at 08:18

## 2024-05-01 ASSESSMENT — PAIN DESCRIPTION - PAIN TYPE: TYPE: CHRONIC PAIN

## 2024-05-01 ASSESSMENT — PAIN DESCRIPTION - DESCRIPTORS: DESCRIPTORS: THROBBING

## 2024-05-01 ASSESSMENT — PAIN DESCRIPTION - FREQUENCY: FREQUENCY: CONTINUOUS

## 2024-05-01 ASSESSMENT — PAIN - FUNCTIONAL ASSESSMENT: PAIN_FUNCTIONAL_ASSESSMENT: PREVENTS OR INTERFERES SOME ACTIVE ACTIVITIES AND ADLS

## 2024-05-01 ASSESSMENT — PAIN DESCRIPTION - LOCATION: LOCATION: LEG

## 2024-05-01 ASSESSMENT — PAIN DESCRIPTION - ORIENTATION: ORIENTATION: LEFT

## 2024-05-01 ASSESSMENT — PAIN SCALES - GENERAL: PAINLEVEL_OUTOF10: 8

## 2024-05-01 ASSESSMENT — PAIN DESCRIPTION - ONSET: ONSET: ON-GOING

## 2024-05-01 NOTE — PROGRESS NOTES
(cm^2) 147.66 cm^2 05/01/24 0814   Change in Wound Size % (l*w) -4.17 05/01/24 0814   Wound Volume (cm^3) 14.766 cm^3 05/01/24 0814   Wound Healing % -4 05/01/24 0814   Post-Procedure Length (cm) 13.9 cm 05/01/24 0856   Post-Procedure Width (cm) 10.7 cm 05/01/24 0856   Post-Procedure Depth (cm) 0.2 cm 05/01/24 0856   Post-Procedure Surface Area (cm^2) 148.73 cm^2 05/01/24 0856   Post-Procedure Volume (cm^3) 29.746 cm^3 05/01/24 0856   Wound Assessment Pink/red;Fibrin;Granulation tissue 05/01/24 0814   Drainage Amount Large (50-75% saturated) 05/01/24 0814   Drainage Description Sanguinous;Brown 05/01/24 0814   Odor None 05/01/24 0814   Nimo-wound Assessment Dry/flaky 05/01/24 0814   Margins Attached edges 04/24/24 0816   Wound Thickness Description not for Pressure Injury Full thickness 04/24/24 0816   Number of days: 7       Wound 04/24/24 Heel Left #2 (Active)   Wound Image   04/24/24 0816   Wound Length (cm) 4.7 cm 05/01/24 0814   Wound Width (cm) 3.7 cm 05/01/24 0814   Wound Depth (cm) 0.3 cm 05/01/24 0814   Wound Surface Area (cm^2) 17.39 cm^2 05/01/24 0814   Change in Wound Size % (l*w) -65.15 05/01/24 0814   Wound Volume (cm^3) 5.217 cm^3 05/01/24 0814   Wound Healing % -65 05/01/24 0814   Post-Procedure Length (cm) 4.8 cm 05/01/24 0856   Post-Procedure Width (cm) 3.7 cm 05/01/24 0856   Post-Procedure Depth (cm) 0.3 cm 05/01/24 0856   Post-Procedure Surface Area (cm^2) 17.76 cm^2 05/01/24 0856   Post-Procedure Volume (cm^3) 5.328 cm^3 05/01/24 0856   Wound Assessment Granulation tissue 05/01/24 0814   Drainage Amount Moderate (25-50%) 05/01/24 0814   Drainage Description Serosanguinous;Brown 05/01/24 0814   Odor None 05/01/24 0814   Nimo-wound Assessment Intact;Dry/flaky 05/01/24 0814   Margins Attached edges 04/24/24 0816   Wound Thickness Description not for Pressure Injury Full thickness 04/24/24 0816   Number of days: 7        Procedure Note  Indications:  Based on my examination of this patient's

## 2024-05-03 NOTE — DISCHARGE INSTRUCTIONS
Visit Discharge/Physician Orders     Discharge condition: Stable     Assessment of pain at discharge:yes      Anesthetic used: 4% Lido Soln     Discharge to: Home     Left via:Private automobile     Accompanied by: accompanied by family     ECF/HHA: [facility]      Dressing Orders:LEFT HEEL: Cleanse with normal saline, calcium alginate, apply Coban 2. Change M-W(@[facility])/F  LEFT LATERAL LEG: Cleanse with normal saline, calcium alginate, apply Coban 2. Change M-W(@[facility])/F      *Do not wet calcium alginate when removing*      Treatment Orders: FOLLOW NUTRITIOUS DIET. CHOOSE FOODS HIGH IN PROTEIN -CHICKEN- FISH-AND EGGS,  CHOOSE FOODS HIGH IN VITAMIN C.   MULTIVITAMIN DAILY.       STOP SMOKING     Non-weight bearing left heel.      [facility] followup visit ____1 wk Dr. IZAGUIRRE_________________________  (Please note your next appointment above and if you are unable to keep, kindly give a 24 hour notice. Thank you.)     Physician signature:__________________________        If you experience any of the following, please call the Wound Care Center during business hours:     * Increase in Pain  * Temperature over 101  * Increase in drainage from your wound  * Drainage with a foul odor  * Bleeding  * Increase in swelling  * Need for compression bandage changes due to slippage, breakthrough drainage.     If you need medical attention outside of the business hours of the Wound Care Centers please contact your PCP or go to the nearest emergency room.

## 2024-05-08 ENCOUNTER — HOSPITAL ENCOUNTER (OUTPATIENT)
Dept: WOUND CARE | Age: 69
Discharge: HOME | End: 2024-05-08
Attending: SURGERY
Payer: MEDICARE

## 2024-05-08 VITALS
RESPIRATION RATE: 18 BRPM | SYSTOLIC BLOOD PRESSURE: 129 MMHG | WEIGHT: 220 LBS | BODY MASS INDEX: 30.8 KG/M2 | TEMPERATURE: 97.3 F | HEIGHT: 71 IN | DIASTOLIC BLOOD PRESSURE: 82 MMHG | HEART RATE: 81 BPM

## 2024-05-08 DIAGNOSIS — L89.624 PRESSURE INJURY OF LEFT HEEL, STAGE 4 (HCC): Primary | ICD-10-CM

## 2024-05-08 DIAGNOSIS — L97.222 VENOUS STASIS ULCER OF LEFT CALF WITH FAT LAYER EXPOSED WITHOUT VARICOSE VEINS (HCC): ICD-10-CM

## 2024-05-08 DIAGNOSIS — I87.2 VENOUS STASIS ULCER OF LEFT CALF WITH FAT LAYER EXPOSED WITHOUT VARICOSE VEINS (HCC): ICD-10-CM

## 2024-05-08 PROCEDURE — 11042 DBRDMT SUBQ TIS 1ST 20SQCM/<: CPT | Performed by: SURGERY

## 2024-05-08 PROCEDURE — 11042 DBRDMT SUBQ TIS 1ST 20SQCM/<: CPT

## 2024-05-08 PROCEDURE — 11045 DBRDMT SUBQ TISS EACH ADDL: CPT

## 2024-05-08 PROCEDURE — 11045 DBRDMT SUBQ TISS EACH ADDL: CPT | Performed by: SURGERY

## 2024-05-08 RX ORDER — LIDOCAINE 50 MG/G
OINTMENT TOPICAL ONCE
OUTPATIENT
Start: 2024-05-08 | End: 2024-05-08

## 2024-05-08 RX ORDER — LIDOCAINE HYDROCHLORIDE 20 MG/ML
JELLY TOPICAL ONCE
OUTPATIENT
Start: 2024-05-08 | End: 2024-05-08

## 2024-05-08 RX ORDER — IBUPROFEN 200 MG
TABLET ORAL ONCE
OUTPATIENT
Start: 2024-05-08 | End: 2024-05-08

## 2024-05-08 RX ORDER — LIDOCAINE HYDROCHLORIDE 40 MG/ML
SOLUTION TOPICAL ONCE
OUTPATIENT
Start: 2024-05-08 | End: 2024-05-08

## 2024-05-08 RX ORDER — LIDOCAINE HYDROCHLORIDE 40 MG/ML
SOLUTION TOPICAL ONCE
Status: COMPLETED | OUTPATIENT
Start: 2024-05-08 | End: 2024-05-08

## 2024-05-08 RX ORDER — TRIAMCINOLONE ACETONIDE 1 MG/G
OINTMENT TOPICAL ONCE
OUTPATIENT
Start: 2024-05-08 | End: 2024-05-08

## 2024-05-08 RX ORDER — LIDOCAINE 40 MG/G
CREAM TOPICAL ONCE
OUTPATIENT
Start: 2024-05-08 | End: 2024-05-08

## 2024-05-08 RX ORDER — SODIUM CHLOR/HYPOCHLOROUS ACID 0.033 %
SOLUTION, IRRIGATION IRRIGATION ONCE
OUTPATIENT
Start: 2024-05-08 | End: 2024-05-08

## 2024-05-08 RX ORDER — CLOBETASOL PROPIONATE 0.5 MG/G
OINTMENT TOPICAL ONCE
OUTPATIENT
Start: 2024-05-08 | End: 2024-05-08

## 2024-05-08 RX ORDER — BETAMETHASONE DIPROPIONATE 0.5 MG/G
CREAM TOPICAL ONCE
OUTPATIENT
Start: 2024-05-08 | End: 2024-05-08

## 2024-05-08 RX ORDER — GENTAMICIN SULFATE 1 MG/G
OINTMENT TOPICAL ONCE
OUTPATIENT
Start: 2024-05-08 | End: 2024-05-08

## 2024-05-08 RX ORDER — BACITRACIN ZINC 500 [USP'U]/G
OINTMENT TOPICAL ONCE
OUTPATIENT
Start: 2024-05-08 | End: 2024-05-08

## 2024-05-08 RX ORDER — BACITRACIN ZINC AND POLYMYXIN B SULFATE 500; 1000 [USP'U]/G; [USP'U]/G
OINTMENT TOPICAL ONCE
OUTPATIENT
Start: 2024-05-08 | End: 2024-05-08

## 2024-05-08 RX ADMIN — LIDOCAINE HYDROCHLORIDE 15 ML: 40 SOLUTION TOPICAL at 08:06

## 2024-05-08 ASSESSMENT — PAIN DESCRIPTION - PAIN TYPE: TYPE: CHRONIC PAIN

## 2024-05-08 ASSESSMENT — PAIN DESCRIPTION - FREQUENCY: FREQUENCY: CONTINUOUS

## 2024-05-08 ASSESSMENT — PAIN DESCRIPTION - DESCRIPTORS: DESCRIPTORS: ACHING;SHOOTING;THROBBING

## 2024-05-08 ASSESSMENT — PAIN DESCRIPTION - ORIENTATION: ORIENTATION: LEFT

## 2024-05-08 ASSESSMENT — PAIN - FUNCTIONAL ASSESSMENT: PAIN_FUNCTIONAL_ASSESSMENT: PREVENTS OR INTERFERES SOME ACTIVE ACTIVITIES AND ADLS

## 2024-05-08 ASSESSMENT — PAIN DESCRIPTION - ONSET: ONSET: ON-GOING

## 2024-05-08 ASSESSMENT — PAIN DESCRIPTION - LOCATION: LOCATION: LEG

## 2024-05-08 ASSESSMENT — PAIN SCALES - GENERAL: PAINLEVEL_OUTOF10: 10
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
## 2024-05-10 NOTE — DISCHARGE INSTRUCTIONS
Visit Discharge/Physician Orders     Discharge condition: Stable     Assessment of pain at discharge:yes      Anesthetic used: 4% Lido Soln     Discharge to: Home     Left via:Private automobile     Accompanied by: accompanied by family     ECF/HHA: [facility] (ok for kerlix and coban when supply of allowed coban 2 runs out)     Dressing Orders:LEFT HEEL: Cleanse with normal saline, calcium alginate, apply Coban 2. Change M-W(@[facility])/F    LEFT LATERAL LEG: Cleanse with normal saline, calcium alginate, apply Coban 2. Change M-W(@[facility])/F      *Do not wet calcium alginate when removing*      Treatment Orders: FOLLOW NUTRITIOUS DIET. CHOOSE FOODS HIGH IN PROTEIN -CHICKEN- FISH-AND EGGS,  CHOOSE FOODS HIGH IN VITAMIN C.   MULTIVITAMIN DAILY.       STOP SMOKING     Non-weight bearing left heel.      [facility] followup visit :1 week ______________________________  (Please note your next appointment above and if you are unable to keep, kindly give a 24 hour notice. Thank you.)     Physician signature:__________________________      If you experience any of the following, please call the Wound Care Center during business hours:     * Increase in Pain  * Temperature over 101  * Increase in drainage from your wound  * Drainage with a foul odor  * Bleeding  * Increase in swelling  * Need for compression bandage changes due to slippage, breakthrough drainage.     If you need medical attention outside of the business hours of the Wound Care Centers please contact your PCP or go to the nearest emergency room.

## 2024-05-15 ENCOUNTER — HOSPITAL ENCOUNTER (OUTPATIENT)
Dept: WOUND CARE | Age: 69
Discharge: HOME | End: 2024-05-15
Attending: SURGERY
Payer: MEDICARE

## 2024-05-15 VITALS
TEMPERATURE: 98 F | SYSTOLIC BLOOD PRESSURE: 127 MMHG | RESPIRATION RATE: 20 BRPM | HEART RATE: 84 BPM | DIASTOLIC BLOOD PRESSURE: 79 MMHG

## 2024-05-15 DIAGNOSIS — I87.2 VENOUS STASIS ULCER OF LEFT CALF WITH FAT LAYER EXPOSED WITHOUT VARICOSE VEINS (HCC): ICD-10-CM

## 2024-05-15 DIAGNOSIS — L97.222 VENOUS STASIS ULCER OF LEFT CALF WITH FAT LAYER EXPOSED WITHOUT VARICOSE VEINS (HCC): ICD-10-CM

## 2024-05-15 DIAGNOSIS — L89.624 PRESSURE INJURY OF LEFT HEEL, STAGE 4 (HCC): Primary | ICD-10-CM

## 2024-05-15 PROCEDURE — 11042 DBRDMT SUBQ TIS 1ST 20SQCM/<: CPT

## 2024-05-15 PROCEDURE — 11045 DBRDMT SUBQ TISS EACH ADDL: CPT

## 2024-05-15 RX ORDER — BETAMETHASONE DIPROPIONATE 0.5 MG/G
CREAM TOPICAL ONCE
OUTPATIENT
Start: 2024-05-15 | End: 2024-05-15

## 2024-05-15 RX ORDER — LIDOCAINE 50 MG/G
OINTMENT TOPICAL ONCE
OUTPATIENT
Start: 2024-05-15 | End: 2024-05-15

## 2024-05-15 RX ORDER — GENTAMICIN SULFATE 1 MG/G
OINTMENT TOPICAL ONCE
OUTPATIENT
Start: 2024-05-15 | End: 2024-05-15

## 2024-05-15 RX ORDER — IBUPROFEN 200 MG
TABLET ORAL ONCE
OUTPATIENT
Start: 2024-05-15 | End: 2024-05-15

## 2024-05-15 RX ORDER — LIDOCAINE 40 MG/G
CREAM TOPICAL ONCE
OUTPATIENT
Start: 2024-05-15 | End: 2024-05-15

## 2024-05-15 RX ORDER — LIDOCAINE HYDROCHLORIDE 20 MG/ML
JELLY TOPICAL ONCE
OUTPATIENT
Start: 2024-05-15 | End: 2024-05-15

## 2024-05-15 RX ORDER — BACITRACIN ZINC AND POLYMYXIN B SULFATE 500; 1000 [USP'U]/G; [USP'U]/G
OINTMENT TOPICAL ONCE
OUTPATIENT
Start: 2024-05-15 | End: 2024-05-15

## 2024-05-15 RX ORDER — LIDOCAINE HYDROCHLORIDE 40 MG/ML
SOLUTION TOPICAL ONCE
OUTPATIENT
Start: 2024-05-15 | End: 2024-05-15

## 2024-05-15 RX ORDER — CLOBETASOL PROPIONATE 0.5 MG/G
OINTMENT TOPICAL ONCE
OUTPATIENT
Start: 2024-05-15 | End: 2024-05-15

## 2024-05-15 RX ORDER — TRIAMCINOLONE ACETONIDE 1 MG/G
OINTMENT TOPICAL ONCE
OUTPATIENT
Start: 2024-05-15 | End: 2024-05-15

## 2024-05-15 RX ORDER — SODIUM CHLOR/HYPOCHLOROUS ACID 0.033 %
SOLUTION, IRRIGATION IRRIGATION ONCE
OUTPATIENT
Start: 2024-05-15 | End: 2024-05-15

## 2024-05-15 RX ORDER — BACITRACIN ZINC 500 [USP'U]/G
OINTMENT TOPICAL ONCE
OUTPATIENT
Start: 2024-05-15 | End: 2024-05-15

## 2024-05-15 RX ORDER — LIDOCAINE HYDROCHLORIDE 40 MG/ML
SOLUTION TOPICAL ONCE
Status: COMPLETED | OUTPATIENT
Start: 2024-05-15 | End: 2024-05-15

## 2024-05-15 RX ADMIN — LIDOCAINE HYDROCHLORIDE 10 ML: 40 SOLUTION TOPICAL at 08:09

## 2024-05-15 ASSESSMENT — PAIN DESCRIPTION - LOCATION: LOCATION: LEG

## 2024-05-15 ASSESSMENT — PAIN SCALES - GENERAL: PAINLEVEL_OUTOF10: 8

## 2024-05-15 ASSESSMENT — PAIN DESCRIPTION - FREQUENCY: FREQUENCY: CONTINUOUS

## 2024-05-15 ASSESSMENT — PAIN DESCRIPTION - ORIENTATION: ORIENTATION: LEFT

## 2024-05-15 ASSESSMENT — PAIN DESCRIPTION - DESCRIPTORS: DESCRIPTORS: ACHING

## 2024-05-15 NOTE — PROGRESS NOTES
tablet Take 1 tablet by mouth every 4 hours as needed for Pain.      vitamin D (VITAMIN D3) 40960 UNIT CAPS Take 1 capsule by mouth once a week      docusate sodium (COLACE) 100 MG capsule Take 1 capsule by mouth 2 times daily (Patient not taking: Reported on 4/24/2024)      levothyroxine (SYNTHROID) 50 MCG tablet Take 1.5 tablets by mouth Daily      pantoprazole (PROTONIX) 40 MG tablet Take 1 tablet by mouth daily       No current facility-administered medications on file prior to encounter.       REVIEW OF SYSTEMS See HPI    Objective:    /79   Pulse 84   Temp 98 °F (36.7 °C) (Temporal)   Resp 20   Wt Readings from Last 3 Encounters:   05/08/24 99.8 kg (220 lb)   05/01/24 99.8 kg (220 lb)   03/25/24 90.3 kg (199 lb)     PHYSICAL EXAM  CONSTITUTIONAL:   Awake, alet, cooperative   EYES:  lids and lashes normal   ENT: external ears and nose without lesions   NECK:  supple, symmetrical, trachea midline   SKIN:  Open wound Present    Assessment:     Problem List Items Addressed This Visit       Pressure injury of left heel, stage 4 (HCC) - Primary (Chronic)    Relevant Orders    Initiate Outpatient Wound Care Protocol    Venous stasis ulcer of left calf with fat layer exposed without varicose veins (HCC)    Relevant Orders    Initiate Outpatient Wound Care Protocol       Pre Debridement Measurements:  Are located in the Wound/Ulcer Documentation Flow Sheet  Post Debridement Measurements:  Wound/Ulcer Descriptions are Pre Debridement except measurements:    Wound 04/24/24 Leg Left;Lateral #1 (Active)   Wound Image   04/24/24 0816   Dressing Status New dressing applied;Clean;Dry;Intact 05/15/24 0921   Wound Cleansed Cleansed with saline 05/15/24 0921   Dressing/Treatment Alginate;ABD 05/15/24 0921   Wound Length (cm) 10.6 cm 05/15/24 0803   Wound Width (cm) 9 cm 05/15/24 0803   Wound Depth (cm) 0.01 cm 05/15/24 0803   Wound Surface Area (cm^2) 95.4 cm^2 05/15/24 0803   Change in Wound Size % (l*w) 32.7 05/15/24

## 2024-05-15 NOTE — PLAN OF CARE
Problem: Pain  Goal: Verbalizes/displays adequate comfort level or baseline comfort level  Outcome: Progressing     Problem: Cognitive:  Goal: Knowledge of wound care  Description: Knowledge of wound care  Outcome: Progressing  Goal: Understands risk factors for wounds  Description: Understands risk factors for wounds  Outcome: Progressing     Problem: Wound:  Goal: Will show signs of wound healing; wound closure and no evidence of infection  Description: Will show signs of wound healing; wound closure and no evidence of infection  Outcome: Progressing     Problem: Venous:  Goal: Signs of wound healing will improve  Description: Signs of wound healing will improve  Outcome: Adequate for Discharge     Problem: Smoking cessation:  Goal: Ability to formulate a plan to maintain a tobacco-free life will be supported  Description: Ability to formulate a plan to maintain a tobacco-free life will be supported  Outcome: Progressing

## 2024-05-17 NOTE — DISCHARGE INSTRUCTIONS
Visit Discharge/Physician Orders     Discharge condition: Stable     Assessment of pain at discharge:yes      Anesthetic used: 4% Lido Soln     Discharge to: Home     Left via:Private automobile     Accompanied by: accompanied by family     ECF/HHA: [facility] (ok for kerlix and coban when supply of allowed coban 2 runs out)     Dressing Orders:LEFT HEEL: Cleanse with normal saline, calcium alginate, apply Coban 2. Change M-W(@[facility])/F     LEFT LATERAL LEG: Cleanse with normal saline, calcium alginate, apply Coban 2. Change M-W(@[facility])/F      *Do not wet calcium alginate when removing*      Treatment Orders: FOLLOW NUTRITIOUS DIET. CHOOSE FOODS HIGH IN PROTEIN -CHICKEN- FISH-AND EGGS,  CHOOSE FOODS HIGH IN VITAMIN C.   MULTIVITAMIN DAILY.       STOP SMOKING     Non-weight bearing left heel.      [facility] followup visit :1 week ______________________________  (Please note your next appointment above and if you are unable to keep, kindly give a 24 hour notice. Thank you.)     Physician signature:__________________________      If you experience any of the following, please call the Wound Care Center during business hours:     * Increase in Pain  * Temperature over 101  * Increase in drainage from your wound  * Drainage with a foul odor  * Bleeding  * Increase in swelling  * Need for compression bandage changes due to slippage, breakthrough drainage.     If you need medical attention outside of the business hours of the Wound Care Centers please contact your PCP or go to the nearest emergency room.

## 2024-05-22 ENCOUNTER — HOSPITAL ENCOUNTER (OUTPATIENT)
Dept: WOUND CARE | Age: 69
Discharge: HOME | End: 2024-05-22
Attending: SURGERY
Payer: MEDICARE

## 2024-05-22 VITALS
SYSTOLIC BLOOD PRESSURE: 132 MMHG | WEIGHT: 220 LBS | TEMPERATURE: 97.9 F | DIASTOLIC BLOOD PRESSURE: 72 MMHG | BODY MASS INDEX: 30.8 KG/M2 | RESPIRATION RATE: 20 BRPM | HEART RATE: 80 BPM | HEIGHT: 71 IN

## 2024-05-22 DIAGNOSIS — L97.222 VENOUS STASIS ULCER OF LEFT CALF WITH FAT LAYER EXPOSED WITHOUT VARICOSE VEINS (HCC): ICD-10-CM

## 2024-05-22 DIAGNOSIS — L89.624 PRESSURE INJURY OF LEFT HEEL, STAGE 4 (HCC): Primary | ICD-10-CM

## 2024-05-22 DIAGNOSIS — I87.2 VENOUS STASIS ULCER OF LEFT CALF WITH FAT LAYER EXPOSED WITHOUT VARICOSE VEINS (HCC): ICD-10-CM

## 2024-05-22 PROCEDURE — 11042 DBRDMT SUBQ TIS 1ST 20SQCM/<: CPT | Performed by: SURGERY

## 2024-05-22 PROCEDURE — 11045 DBRDMT SUBQ TISS EACH ADDL: CPT | Performed by: SURGERY

## 2024-05-22 PROCEDURE — 11042 DBRDMT SUBQ TIS 1ST 20SQCM/<: CPT

## 2024-05-22 PROCEDURE — 11045 DBRDMT SUBQ TISS EACH ADDL: CPT

## 2024-05-22 RX ORDER — LIDOCAINE HYDROCHLORIDE 40 MG/ML
SOLUTION TOPICAL ONCE
Status: COMPLETED | OUTPATIENT
Start: 2024-05-22 | End: 2024-05-22

## 2024-05-22 RX ORDER — SODIUM CHLOR/HYPOCHLOROUS ACID 0.033 %
SOLUTION, IRRIGATION IRRIGATION ONCE
OUTPATIENT
Start: 2024-05-22 | End: 2024-05-22

## 2024-05-22 RX ORDER — LIDOCAINE 40 MG/G
CREAM TOPICAL ONCE
OUTPATIENT
Start: 2024-05-22 | End: 2024-05-22

## 2024-05-22 RX ORDER — LIDOCAINE HYDROCHLORIDE 20 MG/ML
JELLY TOPICAL ONCE
OUTPATIENT
Start: 2024-05-22 | End: 2024-05-22

## 2024-05-22 RX ORDER — LIDOCAINE HYDROCHLORIDE 40 MG/ML
SOLUTION TOPICAL ONCE
OUTPATIENT
Start: 2024-05-22 | End: 2024-05-22

## 2024-05-22 RX ORDER — BACITRACIN ZINC 500 [USP'U]/G
OINTMENT TOPICAL ONCE
OUTPATIENT
Start: 2024-05-22 | End: 2024-05-22

## 2024-05-22 RX ORDER — BACITRACIN ZINC AND POLYMYXIN B SULFATE 500; 1000 [USP'U]/G; [USP'U]/G
OINTMENT TOPICAL ONCE
OUTPATIENT
Start: 2024-05-22 | End: 2024-05-22

## 2024-05-22 RX ORDER — BETAMETHASONE DIPROPIONATE 0.5 MG/G
CREAM TOPICAL ONCE
OUTPATIENT
Start: 2024-05-22 | End: 2024-05-22

## 2024-05-22 RX ORDER — GENTAMICIN SULFATE 1 MG/G
OINTMENT TOPICAL ONCE
OUTPATIENT
Start: 2024-05-22 | End: 2024-05-22

## 2024-05-22 RX ORDER — CLOBETASOL PROPIONATE 0.5 MG/G
OINTMENT TOPICAL ONCE
OUTPATIENT
Start: 2024-05-22 | End: 2024-05-22

## 2024-05-22 RX ORDER — TRIAMCINOLONE ACETONIDE 1 MG/G
OINTMENT TOPICAL ONCE
OUTPATIENT
Start: 2024-05-22 | End: 2024-05-22

## 2024-05-22 RX ORDER — LIDOCAINE 50 MG/G
OINTMENT TOPICAL ONCE
OUTPATIENT
Start: 2024-05-22 | End: 2024-05-22

## 2024-05-22 RX ORDER — IBUPROFEN 200 MG
TABLET ORAL ONCE
OUTPATIENT
Start: 2024-05-22 | End: 2024-05-22

## 2024-05-22 RX ADMIN — LIDOCAINE HYDROCHLORIDE 10 ML: 40 SOLUTION TOPICAL at 08:31

## 2024-05-22 ASSESSMENT — PAIN DESCRIPTION - DESCRIPTORS: DESCRIPTORS: ACHING

## 2024-05-22 ASSESSMENT — PAIN DESCRIPTION - ONSET: ONSET: ON-GOING

## 2024-05-22 ASSESSMENT — PAIN - FUNCTIONAL ASSESSMENT: PAIN_FUNCTIONAL_ASSESSMENT: PREVENTS OR INTERFERES SOME ACTIVE ACTIVITIES AND ADLS

## 2024-05-22 ASSESSMENT — PAIN DESCRIPTION - PAIN TYPE: TYPE: CHRONIC PAIN

## 2024-05-22 ASSESSMENT — PAIN SCALES - GENERAL: PAINLEVEL_OUTOF10: 7

## 2024-05-22 ASSESSMENT — PAIN DESCRIPTION - LOCATION: LOCATION: LEG

## 2024-05-22 ASSESSMENT — PAIN DESCRIPTION - FREQUENCY: FREQUENCY: CONTINUOUS

## 2024-05-22 ASSESSMENT — PAIN DESCRIPTION - ORIENTATION: ORIENTATION: LEFT

## 2024-05-22 NOTE — PLAN OF CARE
Problem: Pain  Goal: Verbalizes/displays adequate comfort level or baseline comfort level  Outcome: Progressing     Problem: Cognitive:  Goal: Knowledge of wound care  Description: Knowledge of wound care  Outcome: Progressing  Goal: Understands risk factors for wounds  Description: Understands risk factors for wounds  Outcome: Progressing     Problem: Wound:  Goal: Will show signs of wound healing; wound closure and no evidence of infection  Description: Will show signs of wound healing; wound closure and no evidence of infection  Outcome: Progressing     Problem: Smoking cessation:  Goal: Ability to formulate a plan to maintain a tobacco-free life will be supported  Description: Ability to formulate a plan to maintain a tobacco-free life will be supported  Outcome: Progressing

## 2024-05-22 NOTE — PROGRESS NOTES
*Do not wet calcium alginate when removing*      Treatment Orders: FOLLOW NUTRITIOUS DIET. CHOOSE FOODS HIGH IN PROTEIN -CHICKEN- FISH-AND EGGS,  CHOOSE FOODS HIGH IN VITAMIN C.   MULTIVITAMIN DAILY.       STOP SMOKING     Non-weight bearing left heel.      [facility] followup visit :1 week ______________________________  (Please note your next appointment above and if you are unable to keep, kindly give a 24 hour notice. Thank you.)     Physician signature:__________________________      If you experience any of the following, please call the Wound Care Center during business hours:     * Increase in Pain  * Temperature over 101  * Increase in drainage from your wound  * Drainage with a foul odor  * Bleeding  * Increase in swelling  * Need for compression bandage changes due to slippage, breakthrough drainage.     If you need medical attention outside of the business hours of the Wound Care Centers please contact your PCP or go to the nearest emergency room.              Electronically signed by Katerina Wild MD on 5/22/2024 at 9:17 AM

## 2024-05-23 NOTE — DISCHARGE INSTRUCTIONS
Visit Discharge/Physician Orders     Discharge condition: Stable     Assessment of pain at discharge:yes      Anesthetic used: 4% Lido Soln     Discharge to: Home     Left via:Private automobile     Accompanied by: accompanied by family     ECF/HHA: [facility] (ok for kerlix and coban when supply of allowed coban 2 runs out)     Dressing Orders:LEFT HEEL: Cleanse with normal saline, calcium alginate, apply Coban 2. Change M-W(@[facility])/F     LEFT LATERAL LEG: Cleanse with normal saline, calcium alginate, apply Coban 2. Change M-W(@[facility])/F      *Do not wet calcium alginate when removing*      Treatment Orders: FOLLOW NUTRITIOUS DIET. CHOOSE FOODS HIGH IN PROTEIN -CHICKEN- FISH-AND EGGS,  CHOOSE FOODS HIGH IN VITAMIN C.   MULTIVITAMIN DAILY.       STOP SMOKING     Non-weight bearing left heel.      [facility] followup visit :1 week ______________________________  (Please note your next appointment above and if you are unable to keep, kindly give a 24 hour notice. Thank you.)     Physician signature:__________________________      If you experience any of the following, please call the Wound Care Center during business hours:     * Increase in Pain  * Temperature over 101  * Increase in drainage from your wound  * Drainage with a foul odor  * Bleeding  * Increase in swelling  * Need for compression bandage changes due to slippage, breakthrough drainage.     If you need medical attention outside of the business hours of the Wound Care Centers please contact your PCP or go to the nearest emergency room.

## 2024-05-29 ENCOUNTER — HOSPITAL ENCOUNTER (OUTPATIENT)
Dept: WOUND CARE | Age: 69
Discharge: HOME | End: 2024-05-29
Attending: SURGERY
Payer: MEDICARE

## 2024-05-29 VITALS
RESPIRATION RATE: 20 BRPM | SYSTOLIC BLOOD PRESSURE: 166 MMHG | HEART RATE: 82 BPM | DIASTOLIC BLOOD PRESSURE: 72 MMHG | TEMPERATURE: 97.4 F

## 2024-05-29 DIAGNOSIS — L97.222 VENOUS STASIS ULCER OF LEFT CALF WITH FAT LAYER EXPOSED WITHOUT VARICOSE VEINS (HCC): ICD-10-CM

## 2024-05-29 DIAGNOSIS — L89.624 PRESSURE INJURY OF LEFT HEEL, STAGE 4 (HCC): Primary | ICD-10-CM

## 2024-05-29 DIAGNOSIS — I87.2 VENOUS STASIS ULCER OF LEFT CALF WITH FAT LAYER EXPOSED WITHOUT VARICOSE VEINS (HCC): ICD-10-CM

## 2024-05-29 PROCEDURE — 11042 DBRDMT SUBQ TIS 1ST 20SQCM/<: CPT

## 2024-05-29 PROCEDURE — 11042 DBRDMT SUBQ TIS 1ST 20SQCM/<: CPT | Performed by: SURGERY

## 2024-05-29 PROCEDURE — 11045 DBRDMT SUBQ TISS EACH ADDL: CPT | Performed by: SURGERY

## 2024-05-29 PROCEDURE — 11045 DBRDMT SUBQ TISS EACH ADDL: CPT

## 2024-05-29 RX ORDER — LIDOCAINE 40 MG/G
CREAM TOPICAL ONCE
OUTPATIENT
Start: 2024-05-29 | End: 2024-05-29

## 2024-05-29 RX ORDER — GENTAMICIN SULFATE 1 MG/G
OINTMENT TOPICAL ONCE
OUTPATIENT
Start: 2024-05-29 | End: 2024-05-29

## 2024-05-29 RX ORDER — LIDOCAINE HYDROCHLORIDE 40 MG/ML
SOLUTION TOPICAL ONCE
Status: DISCONTINUED | OUTPATIENT
Start: 2024-05-29 | End: 2024-05-30 | Stop reason: HOSPADM

## 2024-05-29 RX ORDER — TRIAMCINOLONE ACETONIDE 1 MG/G
OINTMENT TOPICAL ONCE
OUTPATIENT
Start: 2024-05-29 | End: 2024-05-29

## 2024-05-29 RX ORDER — LIDOCAINE HYDROCHLORIDE 20 MG/ML
JELLY TOPICAL ONCE
OUTPATIENT
Start: 2024-05-29 | End: 2024-05-29

## 2024-05-29 RX ORDER — LIDOCAINE HYDROCHLORIDE 40 MG/ML
SOLUTION TOPICAL ONCE
OUTPATIENT
Start: 2024-05-29 | End: 2024-05-29

## 2024-05-29 RX ORDER — BACITRACIN ZINC AND POLYMYXIN B SULFATE 500; 1000 [USP'U]/G; [USP'U]/G
OINTMENT TOPICAL ONCE
OUTPATIENT
Start: 2024-05-29 | End: 2024-05-29

## 2024-05-29 RX ORDER — IBUPROFEN 200 MG
TABLET ORAL ONCE
OUTPATIENT
Start: 2024-05-29 | End: 2024-05-29

## 2024-05-29 RX ORDER — SODIUM CHLOR/HYPOCHLOROUS ACID 0.033 %
SOLUTION, IRRIGATION IRRIGATION ONCE
OUTPATIENT
Start: 2024-05-29 | End: 2024-05-29

## 2024-05-29 RX ORDER — BETAMETHASONE DIPROPIONATE 0.5 MG/G
CREAM TOPICAL ONCE
OUTPATIENT
Start: 2024-05-29 | End: 2024-05-29

## 2024-05-29 RX ORDER — CLOBETASOL PROPIONATE 0.5 MG/G
OINTMENT TOPICAL ONCE
OUTPATIENT
Start: 2024-05-29 | End: 2024-05-29

## 2024-05-29 RX ORDER — BACITRACIN ZINC 500 [USP'U]/G
OINTMENT TOPICAL ONCE
OUTPATIENT
Start: 2024-05-29 | End: 2024-05-29

## 2024-05-29 RX ORDER — LIDOCAINE 50 MG/G
OINTMENT TOPICAL ONCE
OUTPATIENT
Start: 2024-05-29 | End: 2024-05-29

## 2024-05-29 ASSESSMENT — PAIN - FUNCTIONAL ASSESSMENT: PAIN_FUNCTIONAL_ASSESSMENT: PREVENTS OR INTERFERES SOME ACTIVE ACTIVITIES AND ADLS

## 2024-05-29 ASSESSMENT — PAIN DESCRIPTION - ORIENTATION: ORIENTATION: LEFT

## 2024-05-29 ASSESSMENT — PAIN DESCRIPTION - LOCATION: LOCATION: LEG

## 2024-05-29 ASSESSMENT — PAIN DESCRIPTION - FREQUENCY: FREQUENCY: CONTINUOUS

## 2024-05-29 ASSESSMENT — PAIN DESCRIPTION - DESCRIPTORS: DESCRIPTORS: SHOOTING;SHARP

## 2024-05-29 ASSESSMENT — PAIN DESCRIPTION - PAIN TYPE: TYPE: CHRONIC PAIN

## 2024-05-29 ASSESSMENT — PAIN DESCRIPTION - ONSET: ONSET: ON-GOING

## 2024-05-29 ASSESSMENT — PAIN SCALES - GENERAL: PAINLEVEL_OUTOF10: 10

## 2024-05-29 NOTE — PLAN OF CARE
Problem: Pain  Goal: Verbalizes/displays adequate comfort level or baseline comfort level  Outcome: Progressing     Problem: Cognitive:  Goal: Knowledge of wound care  Description: Knowledge of wound care  Outcome: Progressing  Goal: Understands risk factors for wounds  Description: Understands risk factors for wounds  Outcome: Progressing     Problem: Wound:  Goal: Will show signs of wound healing; wound closure and no evidence of infection  Description: Will show signs of wound healing; wound closure and no evidence of infection  Outcome: Progressing     Problem: Venous:  Goal: Signs of wound healing will improve  Description: Signs of wound healing will improve  Outcome: Adequate for Discharge     Problem: Smoking cessation:  Goal: Ability to formulate a plan to maintain a tobacco-free life will be supported  Description: Ability to formulate a plan to maintain a tobacco-free life will be supported  Outcome: Not Progressing     Problem: Smoking cessation:  Goal: Ability to formulate a plan to maintain a tobacco-free life will be supported  Description: Ability to formulate a plan to maintain a tobacco-free life will be supported  Outcome: Not Progressing
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
## 2024-06-03 NOTE — DISCHARGE INSTRUCTIONS
Visit Discharge/Physician Orders     Discharge condition: Stable     Assessment of pain at discharge:yes      Anesthetic used: 4% Lido Soln     Discharge to: Home     Left via:Private automobile     Accompanied by: accompanied by family     ECF/[facility]HA: [facility] * New order* (ok for kerlix and coban when supply of Profore runs out)     Dressing Orders:LEFT HEEL: Cleanse with normal saline, calcium alginate, apply Profore. Change M-W(@[facility])/F     LEFT LATERAL LEG: Cleanse with normal saline, calcium alginate, apply Profore. Change M-W(@[facility])/F     *Do not wet calcium alginate when removing*      Treatment Orders: FOLLOW NUTRITIOUS DIET. CHOOSE FOODS HIGH IN PROTEIN -CHICKEN- FISH-AND EGGS,  CHOOSE FOODS HIGH IN VITAMIN C.   MULTIVITAMIN DAILY.       STOP SMOKING     Non-weight bearing left heel.      [facility] followup visit :1 week ______________________________  (Please note your next appointment above and if you are unable to keep, kindly give a 24 hour notice. Thank you.)     Physician signature:__________________________      If you experience any of the following, please call the Wound Care Center during business hours:     * Increase in Pain  * Temperature over 101  * Increase in drainage from your wound  * Drainage with a foul odor  * Bleeding  * Increase in swelling  * Need for compression bandage changes due to slippage, breakthrough drainage.     If you need medical attention outside of the business hours of the Wound Care Centers please contact your PCP or go to the nearest emergency room.

## 2024-06-05 ENCOUNTER — HOSPITAL ENCOUNTER (OUTPATIENT)
Dept: WOUND CARE | Age: 69
Discharge: HOME | End: 2024-06-05
Attending: SURGERY
Payer: MEDICARE

## 2024-06-05 VITALS
HEIGHT: 71 IN | BODY MASS INDEX: 32.2 KG/M2 | SYSTOLIC BLOOD PRESSURE: 139 MMHG | DIASTOLIC BLOOD PRESSURE: 73 MMHG | RESPIRATION RATE: 22 BRPM | TEMPERATURE: 97.2 F | HEART RATE: 75 BPM | WEIGHT: 230 LBS

## 2024-06-05 DIAGNOSIS — L97.222 VENOUS STASIS ULCER OF LEFT CALF WITH FAT LAYER EXPOSED WITHOUT VARICOSE VEINS (HCC): ICD-10-CM

## 2024-06-05 DIAGNOSIS — I87.2 VENOUS STASIS ULCER OF LEFT CALF WITH FAT LAYER EXPOSED WITHOUT VARICOSE VEINS (HCC): ICD-10-CM

## 2024-06-05 DIAGNOSIS — L89.624 PRESSURE INJURY OF LEFT HEEL, STAGE 4 (HCC): Primary | ICD-10-CM

## 2024-06-05 PROCEDURE — 11042 DBRDMT SUBQ TIS 1ST 20SQCM/<: CPT

## 2024-06-05 PROCEDURE — 11042 DBRDMT SUBQ TIS 1ST 20SQCM/<: CPT | Performed by: SURGERY

## 2024-06-05 PROCEDURE — 11045 DBRDMT SUBQ TISS EACH ADDL: CPT | Performed by: SURGERY

## 2024-06-05 PROCEDURE — 11045 DBRDMT SUBQ TISS EACH ADDL: CPT

## 2024-06-05 RX ORDER — BACITRACIN ZINC AND POLYMYXIN B SULFATE 500; 1000 [USP'U]/G; [USP'U]/G
OINTMENT TOPICAL ONCE
OUTPATIENT
Start: 2024-06-05 | End: 2024-06-05

## 2024-06-05 RX ORDER — LIDOCAINE HYDROCHLORIDE 40 MG/ML
SOLUTION TOPICAL ONCE
Status: COMPLETED | OUTPATIENT
Start: 2024-06-05 | End: 2024-06-05

## 2024-06-05 RX ORDER — TRIAMCINOLONE ACETONIDE 1 MG/G
OINTMENT TOPICAL ONCE
OUTPATIENT
Start: 2024-06-05 | End: 2024-06-05

## 2024-06-05 RX ORDER — GENTAMICIN SULFATE 1 MG/G
OINTMENT TOPICAL ONCE
OUTPATIENT
Start: 2024-06-05 | End: 2024-06-05

## 2024-06-05 RX ORDER — CLOBETASOL PROPIONATE 0.5 MG/G
OINTMENT TOPICAL ONCE
OUTPATIENT
Start: 2024-06-05 | End: 2024-06-05

## 2024-06-05 RX ORDER — LIDOCAINE 40 MG/G
CREAM TOPICAL ONCE
OUTPATIENT
Start: 2024-06-05 | End: 2024-06-05

## 2024-06-05 RX ORDER — LIDOCAINE HYDROCHLORIDE 40 MG/ML
SOLUTION TOPICAL ONCE
OUTPATIENT
Start: 2024-06-05 | End: 2024-06-05

## 2024-06-05 RX ORDER — BACITRACIN ZINC 500 [USP'U]/G
OINTMENT TOPICAL ONCE
OUTPATIENT
Start: 2024-06-05 | End: 2024-06-05

## 2024-06-05 RX ORDER — LIDOCAINE 50 MG/G
OINTMENT TOPICAL ONCE
OUTPATIENT
Start: 2024-06-05 | End: 2024-06-05

## 2024-06-05 RX ORDER — LIDOCAINE HYDROCHLORIDE 20 MG/ML
JELLY TOPICAL ONCE
OUTPATIENT
Start: 2024-06-05 | End: 2024-06-05

## 2024-06-05 RX ORDER — SODIUM CHLOR/HYPOCHLOROUS ACID 0.033 %
SOLUTION, IRRIGATION IRRIGATION ONCE
OUTPATIENT
Start: 2024-06-05 | End: 2024-06-05

## 2024-06-05 RX ORDER — OXYCODONE HYDROCHLORIDE 5 MG/1
5 TABLET ORAL EVERY 6 HOURS PRN
Qty: 28 TABLET | Refills: 0 | Status: SHIPPED | OUTPATIENT
Start: 2024-06-05 | End: 2024-06-12

## 2024-06-05 RX ORDER — IBUPROFEN 200 MG
TABLET ORAL ONCE
OUTPATIENT
Start: 2024-06-05 | End: 2024-06-05

## 2024-06-05 RX ORDER — BETAMETHASONE DIPROPIONATE 0.5 MG/G
CREAM TOPICAL ONCE
OUTPATIENT
Start: 2024-06-05 | End: 2024-06-05

## 2024-06-05 RX ADMIN — LIDOCAINE HYDROCHLORIDE 20 ML: 40 SOLUTION TOPICAL at 09:12

## 2024-06-05 ASSESSMENT — PAIN DESCRIPTION - DESCRIPTORS: DESCRIPTORS: SHOOTING;SHARP

## 2024-06-05 ASSESSMENT — PAIN - FUNCTIONAL ASSESSMENT: PAIN_FUNCTIONAL_ASSESSMENT: PREVENTS OR INTERFERES SOME ACTIVE ACTIVITIES AND ADLS

## 2024-06-05 ASSESSMENT — PAIN DESCRIPTION - ORIENTATION: ORIENTATION: LEFT

## 2024-06-05 ASSESSMENT — PAIN SCALES - GENERAL: PAINLEVEL_OUTOF10: 7

## 2024-06-05 ASSESSMENT — PAIN DESCRIPTION - FREQUENCY: FREQUENCY: CONTINUOUS

## 2024-06-05 ASSESSMENT — PAIN DESCRIPTION - ONSET: ONSET: ON-GOING

## 2024-06-05 ASSESSMENT — PAIN DESCRIPTION - LOCATION: LOCATION: LEG

## 2024-06-05 ASSESSMENT — PAIN DESCRIPTION - PAIN TYPE: TYPE: CHRONIC PAIN

## 2024-06-05 NOTE — PROGRESS NOTES
Post-Procedure Width (cm) 2 cm 06/05/24 0945   Post-Procedure Depth (cm) 0.1 cm 06/05/24 0945   Post-Procedure Surface Area (cm^2) 2.8 cm^2 06/05/24 0945   Post-Procedure Volume (cm^3) 0.28 cm^3 06/05/24 0945   Wound Assessment Granulation tissue;Fibrin 06/05/24 0906   Drainage Amount Moderate (25-50%) 06/05/24 0906   Drainage Description Sanguinous 06/05/24 0906   Odor None 06/05/24 0906   Nimo-wound Assessment Dry/flaky 06/05/24 0906   Margins Attached edges 04/24/24 0816   Wound Thickness Description not for Pressure Injury Full thickness 04/24/24 0816   Number of days: 42        Procedure Note  Indications:  Based on my examination of this patient's wound(s)/ulcer(s) today, debridement is required to promote healing and evaluate the wound base.    Performed by: Katerina Wild MD    Consent obtained:  Yes    Time out taken:  Yes    Pain Control: Anesthetic  Anesthetic: 4% Lidocaine Liquid Topical     Debridement:Excisional Debridement    Using curette the wound(s)/ulcer(s) was/were sharply debrided down through and including the removal of epidermis, dermis, and subcutaneous tissue.        Devitalized Tissue Debrided:  fibrin, biofilm, slough, necrotic/eschar, and exudate to stimulate bleeding to promote healing, post debridement good bleeding base and wound edges noted    Wound/Ulcer #: 1 and 2    Percent of Wound/Ulcer Debrided: 80%    Total Surface Area Debrided:  60 sq cm     Estimated Blood Loss:  Minimal  Hemostasis Achieved:  by pressure    Procedural Pain:  8  / 10   Post Procedural Pain:  6 / 10     Response to treatment:  With complaints of pain.     Plan:   Treatment Note please see attached Discharge Instructions    Written patient dismissal instructions given to patient and signed by patient or POA.         Discharge Instructions         Visit Discharge/Physician Orders     Discharge condition: Stable     Assessment of pain at discharge:yes      Anesthetic used: 4% Lido Soln     Discharge

## 2024-06-06 NOTE — DISCHARGE INSTRUCTIONS
Visit Discharge/Physician Orders     Discharge condition: Stable     Assessment of pain at discharge:yes      Anesthetic used: 4% Lido Soln     Discharge to: Home     Left via:Private automobile     Accompanied by: accompanied by family     [facility]: [facility]  (ok for kerlix and coban when supply of Profore runs out)     Dressing Orders:LEFT HEEL LEFT LATERAL LEG: Cleanse with normal saline, calcium calcium alginate, apply Profore. Change M-W(@[facility])/F      *Do not wet calcium alginate when removing*      Treatment Orders: FOLLOW NUTRITIOUS DIET. CHOOSE FOODS HIGH IN PROTEIN -CHICKEN- FISH-AND EGGS,  CHOOSE FOODS HIGH IN VITAMIN C.   MULTIVITAMIN DAILY.       STOP SMOKING     Non-weight bearing left heel.      [facility] followup visit :1 week ______________________________  (Please note your next appointment above and if you are unable to keep, kindly give a 24 hour notice. Thank you.)     Physician signature:__________________________      If you experience any of the following, please call the Wound Care Center during business hours:     * Increase in Pain  * Temperature over 101  * Increase in drainage from your wound  * Drainage with a foul odor  * Bleeding  * Increase in swelling  * Need for compression bandage changes due to slippage, breakthrough drainage.     If you need medical attention outside of the business hours of the Wound Care Centers please contact your PCP or go to the nearest emergency room.

## 2024-06-12 ENCOUNTER — HOSPITAL ENCOUNTER (OUTPATIENT)
Dept: WOUND CARE | Age: 69
Discharge: HOME | End: 2024-06-12
Attending: SURGERY
Payer: MEDICARE

## 2024-06-12 VITALS
RESPIRATION RATE: 20 BRPM | BODY MASS INDEX: 32.2 KG/M2 | HEIGHT: 71 IN | DIASTOLIC BLOOD PRESSURE: 94 MMHG | HEART RATE: 72 BPM | WEIGHT: 230 LBS | TEMPERATURE: 97.7 F | SYSTOLIC BLOOD PRESSURE: 159 MMHG

## 2024-06-12 DIAGNOSIS — L97.222 VENOUS STASIS ULCER OF LEFT CALF WITH FAT LAYER EXPOSED WITHOUT VARICOSE VEINS (HCC): ICD-10-CM

## 2024-06-12 DIAGNOSIS — I87.2 VENOUS STASIS ULCER OF LEFT CALF WITH FAT LAYER EXPOSED WITHOUT VARICOSE VEINS (HCC): ICD-10-CM

## 2024-06-12 DIAGNOSIS — L89.624 PRESSURE INJURY OF LEFT HEEL, STAGE 4 (HCC): Primary | ICD-10-CM

## 2024-06-12 PROCEDURE — 11042 DBRDMT SUBQ TIS 1ST 20SQCM/<: CPT | Performed by: SURGERY

## 2024-06-12 PROCEDURE — 11045 DBRDMT SUBQ TISS EACH ADDL: CPT | Performed by: SURGERY

## 2024-06-12 PROCEDURE — 11045 DBRDMT SUBQ TISS EACH ADDL: CPT

## 2024-06-12 PROCEDURE — 11042 DBRDMT SUBQ TIS 1ST 20SQCM/<: CPT

## 2024-06-12 RX ORDER — GENTAMICIN SULFATE 1 MG/G
OINTMENT TOPICAL ONCE
OUTPATIENT
Start: 2024-06-12 | End: 2024-06-12

## 2024-06-12 RX ORDER — LIDOCAINE 40 MG/G
CREAM TOPICAL ONCE
OUTPATIENT
Start: 2024-06-12 | End: 2024-06-12

## 2024-06-12 RX ORDER — CLOBETASOL PROPIONATE 0.5 MG/G
OINTMENT TOPICAL ONCE
OUTPATIENT
Start: 2024-06-12 | End: 2024-06-12

## 2024-06-12 RX ORDER — TRIAMCINOLONE ACETONIDE 1 MG/G
OINTMENT TOPICAL ONCE
OUTPATIENT
Start: 2024-06-12 | End: 2024-06-12

## 2024-06-12 RX ORDER — BACITRACIN ZINC 500 [USP'U]/G
OINTMENT TOPICAL ONCE
OUTPATIENT
Start: 2024-06-12 | End: 2024-06-12

## 2024-06-12 RX ORDER — BETAMETHASONE DIPROPIONATE 0.5 MG/G
CREAM TOPICAL ONCE
OUTPATIENT
Start: 2024-06-12 | End: 2024-06-12

## 2024-06-12 RX ORDER — LIDOCAINE HYDROCHLORIDE 20 MG/ML
JELLY TOPICAL ONCE
OUTPATIENT
Start: 2024-06-12 | End: 2024-06-12

## 2024-06-12 RX ORDER — SODIUM CHLOR/HYPOCHLOROUS ACID 0.033 %
SOLUTION, IRRIGATION IRRIGATION ONCE
OUTPATIENT
Start: 2024-06-12 | End: 2024-06-12

## 2024-06-12 RX ORDER — IBUPROFEN 200 MG
TABLET ORAL ONCE
OUTPATIENT
Start: 2024-06-12 | End: 2024-06-12

## 2024-06-12 RX ORDER — LIDOCAINE HYDROCHLORIDE 40 MG/ML
SOLUTION TOPICAL ONCE
OUTPATIENT
Start: 2024-06-12 | End: 2024-06-12

## 2024-06-12 RX ORDER — LIDOCAINE HYDROCHLORIDE 40 MG/ML
SOLUTION TOPICAL ONCE
Status: COMPLETED | OUTPATIENT
Start: 2024-06-12 | End: 2024-06-12

## 2024-06-12 RX ORDER — LIDOCAINE 50 MG/G
OINTMENT TOPICAL ONCE
OUTPATIENT
Start: 2024-06-12 | End: 2024-06-12

## 2024-06-12 RX ORDER — BACITRACIN ZINC AND POLYMYXIN B SULFATE 500; 1000 [USP'U]/G; [USP'U]/G
OINTMENT TOPICAL ONCE
OUTPATIENT
Start: 2024-06-12 | End: 2024-06-12

## 2024-06-12 RX ADMIN — LIDOCAINE HYDROCHLORIDE: 40 SOLUTION TOPICAL at 08:29

## 2024-06-12 ASSESSMENT — PAIN DESCRIPTION - LOCATION: LOCATION: LEG

## 2024-06-12 ASSESSMENT — PAIN SCALES - GENERAL: PAINLEVEL_OUTOF10: 8

## 2024-06-12 ASSESSMENT — PAIN DESCRIPTION - DESCRIPTORS: DESCRIPTORS: SHARP;SHOOTING

## 2024-06-12 ASSESSMENT — PAIN DESCRIPTION - ORIENTATION: ORIENTATION: LEFT
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
## 2024-06-17 NOTE — DISCHARGE INSTRUCTIONS
Visit Discharge/Physician Orders     Discharge condition: Stable     Assessment of pain at discharge:yes      Anesthetic used: 4% Lido Soln     Discharge to: Home     Left via:Private automobile     Accompanied by: accompanied by family     ECF/HHA: [facility]  (ok for kerlix and coban when supply of Profore runs out)     Dressing Orders:LEFT HEEL LEFT LATERAL LEG: Cleanse with normal saline, calcium calcium alginate, apply Profore. Change M-W(@[facility])/F      *Do not wet calcium alginate when removing*      Treatment Orders: FOLLOW NUTRITIOUS DIET. CHOOSE FOODS HIGH IN PROTEIN -CHICKEN- FISH-AND EGGS,  CHOOSE FOODS HIGH IN VITAMIN C.   MULTIVITAMIN DAILY.       STOP SMOKING     Non-weight bearing left heel.      [facility] followup visit :1 week ______________________________  (Please note your next appointment above and if you are unable to keep, kindly give a 24 hour notice. Thank you.)     Physician signature:__________________________      If you experience any of the following, please call the Wound Care Center during business hours:     * Increase in Pain  * Temperature over 101  * Increase in drainage from your wound  * Drainage with a foul odor  * Bleeding  * Increase in swelling  * Need for compression bandage changes due to slippage, breakthrough drainage.     If you need medical attention outside of the business hours of the Wound Care Centers please contact your PCP or go to the nearest emergency room.

## 2024-06-19 ENCOUNTER — HOSPITAL ENCOUNTER (OUTPATIENT)
Dept: WOUND CARE | Age: 69
Discharge: HOME | End: 2024-06-19
Attending: SURGERY
Payer: MEDICARE

## 2024-06-19 VITALS
HEART RATE: 78 BPM | TEMPERATURE: 97.7 F | DIASTOLIC BLOOD PRESSURE: 73 MMHG | SYSTOLIC BLOOD PRESSURE: 161 MMHG | RESPIRATION RATE: 18 BRPM

## 2024-06-19 DIAGNOSIS — L97.222 VENOUS STASIS ULCER OF LEFT CALF WITH FAT LAYER EXPOSED WITHOUT VARICOSE VEINS (HCC): ICD-10-CM

## 2024-06-19 DIAGNOSIS — I87.2 VENOUS STASIS ULCER OF LEFT CALF WITH FAT LAYER EXPOSED WITHOUT VARICOSE VEINS (HCC): ICD-10-CM

## 2024-06-19 DIAGNOSIS — L89.624 PRESSURE INJURY OF LEFT HEEL, STAGE 4 (HCC): Primary | ICD-10-CM

## 2024-06-19 PROCEDURE — 11045 DBRDMT SUBQ TISS EACH ADDL: CPT | Performed by: SURGERY

## 2024-06-19 PROCEDURE — 11042 DBRDMT SUBQ TIS 1ST 20SQCM/<: CPT

## 2024-06-19 PROCEDURE — 11042 DBRDMT SUBQ TIS 1ST 20SQCM/<: CPT | Performed by: SURGERY

## 2024-06-19 PROCEDURE — 11045 DBRDMT SUBQ TISS EACH ADDL: CPT

## 2024-06-19 RX ORDER — BACITRACIN ZINC AND POLYMYXIN B SULFATE 500; 1000 [USP'U]/G; [USP'U]/G
OINTMENT TOPICAL ONCE
OUTPATIENT
Start: 2024-06-19 | End: 2024-06-19

## 2024-06-19 RX ORDER — LIDOCAINE 50 MG/G
OINTMENT TOPICAL ONCE
OUTPATIENT
Start: 2024-06-19 | End: 2024-06-19

## 2024-06-19 RX ORDER — SODIUM CHLOR/HYPOCHLOROUS ACID 0.033 %
SOLUTION, IRRIGATION IRRIGATION ONCE
OUTPATIENT
Start: 2024-06-19 | End: 2024-06-19

## 2024-06-19 RX ORDER — BETAMETHASONE DIPROPIONATE 0.5 MG/G
CREAM TOPICAL ONCE
OUTPATIENT
Start: 2024-06-19 | End: 2024-06-19

## 2024-06-19 RX ORDER — LIDOCAINE HYDROCHLORIDE 40 MG/ML
SOLUTION TOPICAL ONCE
Status: COMPLETED | OUTPATIENT
Start: 2024-06-19 | End: 2024-06-19

## 2024-06-19 RX ORDER — IBUPROFEN 200 MG
TABLET ORAL ONCE
OUTPATIENT
Start: 2024-06-19 | End: 2024-06-19

## 2024-06-19 RX ORDER — LIDOCAINE 40 MG/G
CREAM TOPICAL ONCE
OUTPATIENT
Start: 2024-06-19 | End: 2024-06-19

## 2024-06-19 RX ORDER — CLOBETASOL PROPIONATE 0.5 MG/G
OINTMENT TOPICAL ONCE
OUTPATIENT
Start: 2024-06-19 | End: 2024-06-19

## 2024-06-19 RX ORDER — LIDOCAINE HYDROCHLORIDE 20 MG/ML
JELLY TOPICAL ONCE
OUTPATIENT
Start: 2024-06-19 | End: 2024-06-19

## 2024-06-19 RX ORDER — TRIAMCINOLONE ACETONIDE 1 MG/G
OINTMENT TOPICAL ONCE
OUTPATIENT
Start: 2024-06-19 | End: 2024-06-19

## 2024-06-19 RX ORDER — LIDOCAINE HYDROCHLORIDE 40 MG/ML
SOLUTION TOPICAL ONCE
OUTPATIENT
Start: 2024-06-19 | End: 2024-06-19

## 2024-06-19 RX ORDER — BACITRACIN ZINC 500 [USP'U]/G
OINTMENT TOPICAL ONCE
OUTPATIENT
Start: 2024-06-19 | End: 2024-06-19

## 2024-06-19 RX ORDER — GENTAMICIN SULFATE 1 MG/G
OINTMENT TOPICAL ONCE
OUTPATIENT
Start: 2024-06-19 | End: 2024-06-19

## 2024-06-19 RX ADMIN — LIDOCAINE HYDROCHLORIDE 10 ML: 40 SOLUTION TOPICAL at 08:50

## 2024-06-19 ASSESSMENT — PAIN DESCRIPTION - LOCATION: LOCATION: LEG

## 2024-06-19 ASSESSMENT — PAIN DESCRIPTION - DESCRIPTORS: DESCRIPTORS: BURNING;ACHING

## 2024-06-19 ASSESSMENT — PAIN DESCRIPTION - ORIENTATION: ORIENTATION: LEFT

## 2024-06-19 ASSESSMENT — PAIN SCALES - GENERAL: PAINLEVEL_OUTOF10: 10
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
## 2024-06-24 NOTE — DISCHARGE INSTRUCTIONS
Visit Discharge/Physician Orders     Discharge condition: Stable     Assessment of pain at discharge:yes      Anesthetic used: 4% Lido Soln     Discharge to: Home     Left via:Private automobile     Accompanied by: accompanied by family     ECF/HHA: [facility]  (ok for kerlix and coban when supply of Profore runs out) *New orders*      Dressing Orders: LEFT LATERAL LEG: Cleanse with normal saline, calcium calcium alginate, apply Profore. Change M-W(@[facility])/F  LEFT HEEL: healed      *Do not wet calcium alginate when removing*      Treatment Orders: FOLLOW NUTRITIOUS DIET. CHOOSE FOODS HIGH IN PROTEIN -CHICKEN- FISH-AND EGGS,  CHOOSE FOODS HIGH IN VITAMIN C.   MULTIVITAMIN DAILY.       STOP SMOKING     WBAT to Left Heel      [facility] followup visit :1 week ______________________________  (Please note your next appointment above and if you are unable to keep, kindly give a 24 hour notice. Thank you.)     Physician signature:__________________________      If you experience any of the following, please call the Wound Care Center during business hours:     * Increase in Pain  * Temperature over 101  * Increase in drainage from your wound  * Drainage with a foul odor  * Bleeding  * Increase in swelling  * Need for compression bandage changes due to slippage, breakthrough drainage.     If you need medical attention outside of the business hours of the Wound Care Centers please contact your PCP or go to the nearest emergency room.

## 2024-06-26 ENCOUNTER — HOSPITAL ENCOUNTER (OUTPATIENT)
Dept: WOUND CARE | Age: 69
Discharge: HOME | End: 2024-06-26
Attending: SURGERY
Payer: MEDICARE

## 2024-06-26 VITALS
SYSTOLIC BLOOD PRESSURE: 152 MMHG | HEIGHT: 71 IN | HEART RATE: 66 BPM | TEMPERATURE: 96.6 F | RESPIRATION RATE: 18 BRPM | DIASTOLIC BLOOD PRESSURE: 85 MMHG | BODY MASS INDEX: 32.2 KG/M2 | WEIGHT: 230 LBS

## 2024-06-26 DIAGNOSIS — I87.2 VENOUS STASIS ULCER OF LEFT CALF WITH FAT LAYER EXPOSED WITHOUT VARICOSE VEINS (HCC): ICD-10-CM

## 2024-06-26 DIAGNOSIS — L97.222 VENOUS STASIS ULCER OF LEFT CALF WITH FAT LAYER EXPOSED WITHOUT VARICOSE VEINS (HCC): ICD-10-CM

## 2024-06-26 DIAGNOSIS — L89.624 PRESSURE INJURY OF LEFT HEEL, STAGE 4 (HCC): Primary | ICD-10-CM

## 2024-06-26 PROCEDURE — 11042 DBRDMT SUBQ TIS 1ST 20SQCM/<: CPT

## 2024-06-26 PROCEDURE — 11042 DBRDMT SUBQ TIS 1ST 20SQCM/<: CPT | Performed by: SURGERY

## 2024-06-26 PROCEDURE — 11045 DBRDMT SUBQ TISS EACH ADDL: CPT

## 2024-06-26 PROCEDURE — 11045 DBRDMT SUBQ TISS EACH ADDL: CPT | Performed by: SURGERY

## 2024-06-26 RX ORDER — BETAMETHASONE DIPROPIONATE 0.5 MG/G
CREAM TOPICAL ONCE
OUTPATIENT
Start: 2024-06-26 | End: 2024-06-26

## 2024-06-26 RX ORDER — LIDOCAINE HYDROCHLORIDE 20 MG/ML
JELLY TOPICAL ONCE
OUTPATIENT
Start: 2024-06-26 | End: 2024-06-26

## 2024-06-26 RX ORDER — LIDOCAINE 50 MG/G
OINTMENT TOPICAL ONCE
OUTPATIENT
Start: 2024-06-26 | End: 2024-06-26

## 2024-06-26 RX ORDER — IBUPROFEN 200 MG
TABLET ORAL ONCE
OUTPATIENT
Start: 2024-06-26 | End: 2024-06-26

## 2024-06-26 RX ORDER — TRIAMCINOLONE ACETONIDE 1 MG/G
OINTMENT TOPICAL ONCE
OUTPATIENT
Start: 2024-06-26 | End: 2024-06-26

## 2024-06-26 RX ORDER — LIDOCAINE HYDROCHLORIDE 40 MG/ML
SOLUTION TOPICAL ONCE
Status: COMPLETED | OUTPATIENT
Start: 2024-06-26 | End: 2024-06-26

## 2024-06-26 RX ORDER — LIDOCAINE 40 MG/G
CREAM TOPICAL ONCE
OUTPATIENT
Start: 2024-06-26 | End: 2024-06-26

## 2024-06-26 RX ORDER — ACETAMINOPHEN 500 MG
500 TABLET ORAL 4 TIMES DAILY PRN
Qty: 120 TABLET | Refills: 0 | Status: SHIPPED | OUTPATIENT
Start: 2024-06-26

## 2024-06-26 RX ORDER — BACITRACIN ZINC 500 [USP'U]/G
OINTMENT TOPICAL ONCE
OUTPATIENT
Start: 2024-06-26 | End: 2024-06-26

## 2024-06-26 RX ORDER — LIDOCAINE HYDROCHLORIDE 40 MG/ML
SOLUTION TOPICAL ONCE
OUTPATIENT
Start: 2024-06-26 | End: 2024-06-26

## 2024-06-26 RX ORDER — BACITRACIN ZINC AND POLYMYXIN B SULFATE 500; 1000 [USP'U]/G; [USP'U]/G
OINTMENT TOPICAL ONCE
OUTPATIENT
Start: 2024-06-26 | End: 2024-06-26

## 2024-06-26 RX ORDER — CLOBETASOL PROPIONATE 0.5 MG/G
OINTMENT TOPICAL ONCE
OUTPATIENT
Start: 2024-06-26 | End: 2024-06-26

## 2024-06-26 RX ORDER — SODIUM CHLOR/HYPOCHLOROUS ACID 0.033 %
SOLUTION, IRRIGATION IRRIGATION ONCE
OUTPATIENT
Start: 2024-06-26 | End: 2024-06-26

## 2024-06-26 RX ORDER — GENTAMICIN SULFATE 1 MG/G
OINTMENT TOPICAL ONCE
OUTPATIENT
Start: 2024-06-26 | End: 2024-06-26

## 2024-06-26 RX ADMIN — LIDOCAINE HYDROCHLORIDE 5 ML: 40 SOLUTION TOPICAL at 08:55

## 2024-06-26 ASSESSMENT — PAIN DESCRIPTION - ORIENTATION: ORIENTATION: LEFT

## 2024-06-26 ASSESSMENT — PAIN DESCRIPTION - LOCATION: LOCATION: LEG

## 2024-06-26 ASSESSMENT — PAIN DESCRIPTION - DESCRIPTORS: DESCRIPTORS: BURNING;ACHING

## 2024-06-26 ASSESSMENT — PAIN SCALES - GENERAL: PAINLEVEL_OUTOF10: 7
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
## 2024-06-27 NOTE — DISCHARGE INSTRUCTIONS
Visit Discharge/Physician Orders     Discharge condition: Stable     Assessment of pain at discharge:yes      Anesthetic used: 4% Lido Soln     Discharge to: Home     Left via:Private automobile     Accompanied by: accompanied by family     ECF/HHA: [facility]  (ok for kerlix and coban when supply of Profore runs out)       Dressing Orders: LEFT LATERAL LEG: Cleanse with normal saline, calcium alginate, apply Profore. Change M-W(@[facility])/F  LEFT HEEL: healed       *Do not wet calcium alginate when removing*      Treatment Orders: FOLLOW NUTRITIOUS DIET. CHOOSE FOODS HIGH IN PROTEIN -CHICKEN- FISH-AND EGGS,  CHOOSE FOODS HIGH IN VITAMIN C.   MULTIVITAMIN DAILY.       STOP SMOKING     WBAT to Left Heel      [facility] followup visit :1 week ______________________________  (Please note your next appointment above and if you are unable to keep, kindly give a 24 hour notice. Thank you.)     Physician signature:__________________________      If you experience any of the following, please call the Wound Care Center during business hours:     * Increase in Pain  * Temperature over 101  * Increase in drainage from your wound  * Drainage with a foul odor  * Bleeding  * Increase in swelling  * Need for compression bandage changes due to slippage, breakthrough drainage.     If you need medical attention outside of the business hours of the Wound Care Centers please contact your PCP or go to the nearest emergency room.

## 2024-07-03 ENCOUNTER — HOSPITAL ENCOUNTER (OUTPATIENT)
Dept: WOUND CARE | Age: 69
Discharge: HOME | End: 2024-07-03
Attending: SURGERY
Payer: MEDICARE

## 2024-07-03 VITALS
TEMPERATURE: 97.1 F | BODY MASS INDEX: 32.2 KG/M2 | RESPIRATION RATE: 18 BRPM | HEIGHT: 71 IN | HEART RATE: 81 BPM | SYSTOLIC BLOOD PRESSURE: 139 MMHG | DIASTOLIC BLOOD PRESSURE: 86 MMHG | WEIGHT: 230 LBS

## 2024-07-03 DIAGNOSIS — I87.2 VENOUS STASIS ULCER OF LEFT CALF WITH FAT LAYER EXPOSED WITHOUT VARICOSE VEINS (HCC): ICD-10-CM

## 2024-07-03 DIAGNOSIS — L89.624 PRESSURE INJURY OF LEFT HEEL, STAGE 4 (HCC): Primary | ICD-10-CM

## 2024-07-03 DIAGNOSIS — L97.222 VENOUS STASIS ULCER OF LEFT CALF WITH FAT LAYER EXPOSED WITHOUT VARICOSE VEINS (HCC): ICD-10-CM

## 2024-07-03 PROCEDURE — 11042 DBRDMT SUBQ TIS 1ST 20SQCM/<: CPT

## 2024-07-03 PROCEDURE — 11045 DBRDMT SUBQ TISS EACH ADDL: CPT

## 2024-07-03 PROCEDURE — 11045 DBRDMT SUBQ TISS EACH ADDL: CPT | Performed by: SURGERY

## 2024-07-03 PROCEDURE — 11042 DBRDMT SUBQ TIS 1ST 20SQCM/<: CPT | Performed by: SURGERY

## 2024-07-03 RX ORDER — LIDOCAINE HYDROCHLORIDE 40 MG/ML
SOLUTION TOPICAL ONCE
Status: COMPLETED | OUTPATIENT
Start: 2024-07-03 | End: 2024-07-03

## 2024-07-03 RX ORDER — TRIAMCINOLONE ACETONIDE 1 MG/G
OINTMENT TOPICAL ONCE
OUTPATIENT
Start: 2024-07-03 | End: 2024-07-03

## 2024-07-03 RX ORDER — BETAMETHASONE DIPROPIONATE 0.5 MG/G
CREAM TOPICAL ONCE
OUTPATIENT
Start: 2024-07-03 | End: 2024-07-03

## 2024-07-03 RX ORDER — LIDOCAINE HYDROCHLORIDE 40 MG/ML
SOLUTION TOPICAL ONCE
OUTPATIENT
Start: 2024-07-03 | End: 2024-07-03

## 2024-07-03 RX ORDER — LIDOCAINE 40 MG/G
CREAM TOPICAL ONCE
OUTPATIENT
Start: 2024-07-03 | End: 2024-07-03

## 2024-07-03 RX ORDER — BACITRACIN ZINC 500 [USP'U]/G
OINTMENT TOPICAL ONCE
OUTPATIENT
Start: 2024-07-03 | End: 2024-07-03

## 2024-07-03 RX ORDER — SODIUM CHLOR/HYPOCHLOROUS ACID 0.033 %
SOLUTION, IRRIGATION IRRIGATION ONCE
OUTPATIENT
Start: 2024-07-03 | End: 2024-07-03

## 2024-07-03 RX ORDER — LIDOCAINE 50 MG/G
OINTMENT TOPICAL ONCE
OUTPATIENT
Start: 2024-07-03 | End: 2024-07-03

## 2024-07-03 RX ORDER — BACITRACIN ZINC AND POLYMYXIN B SULFATE 500; 1000 [USP'U]/G; [USP'U]/G
OINTMENT TOPICAL ONCE
OUTPATIENT
Start: 2024-07-03 | End: 2024-07-03

## 2024-07-03 RX ORDER — IBUPROFEN 200 MG
TABLET ORAL ONCE
OUTPATIENT
Start: 2024-07-03 | End: 2024-07-03

## 2024-07-03 RX ORDER — LIDOCAINE HYDROCHLORIDE 20 MG/ML
JELLY TOPICAL ONCE
OUTPATIENT
Start: 2024-07-03 | End: 2024-07-03

## 2024-07-03 RX ORDER — CLOBETASOL PROPIONATE 0.5 MG/G
OINTMENT TOPICAL ONCE
OUTPATIENT
Start: 2024-07-03 | End: 2024-07-03

## 2024-07-03 RX ORDER — GENTAMICIN SULFATE 1 MG/G
OINTMENT TOPICAL ONCE
OUTPATIENT
Start: 2024-07-03 | End: 2024-07-03

## 2024-07-03 RX ADMIN — LIDOCAINE HYDROCHLORIDE 5 ML: 40 SOLUTION TOPICAL at 08:10

## 2024-07-03 ASSESSMENT — PAIN - FUNCTIONAL ASSESSMENT: PAIN_FUNCTIONAL_ASSESSMENT: PREVENTS OR INTERFERES SOME ACTIVE ACTIVITIES AND ADLS

## 2024-07-03 ASSESSMENT — PAIN SCALES - GENERAL: PAINLEVEL_OUTOF10: 10

## 2024-07-03 ASSESSMENT — PAIN DESCRIPTION - ORIENTATION: ORIENTATION: LEFT

## 2024-07-03 ASSESSMENT — PAIN DESCRIPTION - LOCATION: LOCATION: LEG

## 2024-07-03 ASSESSMENT — PAIN DESCRIPTION - DESCRIPTORS: DESCRIPTORS: STABBING

## 2024-07-03 ASSESSMENT — PAIN DESCRIPTION - FREQUENCY: FREQUENCY: CONTINUOUS

## 2024-07-03 ASSESSMENT — PAIN DESCRIPTION - PAIN TYPE: TYPE: CHRONIC PAIN
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
## 2024-07-03 NOTE — PLAN OF CARE
Problem: Cognitive:  Goal: Knowledge of wound care  Description: Knowledge of wound care  Outcome: Progressing  Goal: Understands risk factors for wounds  Description: Understands risk factors for wounds  Outcome: Progressing     Problem: Wound:  Goal: Will show signs of wound healing; wound closure and no evidence of infection  Description: Will show signs of wound healing; wound closure and no evidence of infection  Outcome: Progressing     Problem: Venous:  Goal: Signs of wound healing will improve  Description: Signs of wound healing will improve  Outcome: Progressing     Problem: Smoking cessation:  Goal: Ability to formulate a plan to maintain a tobacco-free life will be supported  Description: Ability to formulate a plan to maintain a tobacco-free life will be supported  Outcome: Progressing

## 2024-07-08 NOTE — DISCHARGE INSTRUCTIONS
Visit Discharge/Physician Orders     Discharge condition: Stable     Assessment of pain at discharge:yes      Anesthetic used: 4% Lido Soln     Discharge to: Home     Left via:Private automobile     Accompanied by: accompanied by family     ECF/HHA: [facility]  (ok for kerlix and coban when supply of Profore runs out)       Dressing Orders: LEFT LEG WOUND(BLOCKED): Cleanse with normal saline, calcium alginate, apply Profore. Change M-W(@[facility])/F  LEFT HEEL: healed       *Do not wet calcium alginate when removing*      Treatment Orders: FOLLOW NUTRITIOUS DIET. CHOOSE FOODS HIGH IN PROTEIN -CHICKEN- FISH-AND EGGS,  CHOOSE FOODS HIGH IN VITAMIN C.   MULTIVITAMIN DAILY.       STOP SMOKING     WBAT to Left Heel      [facility] followup visit :1 week ______________________________  (Please note your next appointment above and if you are unable to keep, kindly give a 24 hour notice. Thank you.)     Physician signature:__________________________      If you experience any of the following, please call the Wound Care Center during business hours:     * Increase in Pain  * Temperature over 101  * Increase in drainage from your wound  * Drainage with a foul odor  * Bleeding  * Increase in swelling  * Need for compression bandage changes due to slippage, breakthrough drainage.     If you need medical attention outside of the business hours of the Wound Care Centers please contact your PCP or go to the nearest emergency room.

## 2024-07-10 ENCOUNTER — HOSPITAL ENCOUNTER (OUTPATIENT)
Dept: WOUND CARE | Age: 69
Discharge: HOME | End: 2024-07-10
Attending: SURGERY
Payer: MEDICARE

## 2024-07-10 VITALS
WEIGHT: 230 LBS | RESPIRATION RATE: 18 BRPM | BODY MASS INDEX: 32.2 KG/M2 | HEART RATE: 80 BPM | DIASTOLIC BLOOD PRESSURE: 82 MMHG | TEMPERATURE: 98 F | SYSTOLIC BLOOD PRESSURE: 146 MMHG | HEIGHT: 71 IN

## 2024-07-10 DIAGNOSIS — L97.222 VENOUS STASIS ULCER OF LEFT CALF WITH FAT LAYER EXPOSED WITHOUT VARICOSE VEINS (HCC): ICD-10-CM

## 2024-07-10 DIAGNOSIS — L89.624 PRESSURE INJURY OF LEFT HEEL, STAGE 4 (HCC): Primary | ICD-10-CM

## 2024-07-10 DIAGNOSIS — I87.2 VENOUS STASIS ULCER OF LEFT CALF WITH FAT LAYER EXPOSED WITHOUT VARICOSE VEINS (HCC): ICD-10-CM

## 2024-07-10 PROCEDURE — 11045 DBRDMT SUBQ TISS EACH ADDL: CPT

## 2024-07-10 PROCEDURE — 11045 DBRDMT SUBQ TISS EACH ADDL: CPT | Performed by: SURGERY

## 2024-07-10 PROCEDURE — 11042 DBRDMT SUBQ TIS 1ST 20SQCM/<: CPT

## 2024-07-10 PROCEDURE — 11042 DBRDMT SUBQ TIS 1ST 20SQCM/<: CPT | Performed by: SURGERY

## 2024-07-10 RX ORDER — BACITRACIN ZINC 500 [USP'U]/G
OINTMENT TOPICAL ONCE
OUTPATIENT
Start: 2024-07-10 | End: 2024-07-10

## 2024-07-10 RX ORDER — CLOBETASOL PROPIONATE 0.5 MG/G
OINTMENT TOPICAL ONCE
OUTPATIENT
Start: 2024-07-10 | End: 2024-07-10

## 2024-07-10 RX ORDER — LIDOCAINE HYDROCHLORIDE 20 MG/ML
JELLY TOPICAL ONCE
OUTPATIENT
Start: 2024-07-10 | End: 2024-07-10

## 2024-07-10 RX ORDER — LIDOCAINE 50 MG/G
OINTMENT TOPICAL ONCE
OUTPATIENT
Start: 2024-07-10 | End: 2024-07-10

## 2024-07-10 RX ORDER — BETAMETHASONE DIPROPIONATE 0.5 MG/G
CREAM TOPICAL ONCE
OUTPATIENT
Start: 2024-07-10 | End: 2024-07-10

## 2024-07-10 RX ORDER — IBUPROFEN 200 MG
TABLET ORAL ONCE
OUTPATIENT
Start: 2024-07-10 | End: 2024-07-10

## 2024-07-10 RX ORDER — BACITRACIN ZINC AND POLYMYXIN B SULFATE 500; 1000 [USP'U]/G; [USP'U]/G
OINTMENT TOPICAL ONCE
OUTPATIENT
Start: 2024-07-10 | End: 2024-07-10

## 2024-07-10 RX ORDER — TRIAMCINOLONE ACETONIDE 1 MG/G
OINTMENT TOPICAL ONCE
OUTPATIENT
Start: 2024-07-10 | End: 2024-07-10

## 2024-07-10 RX ORDER — GENTAMICIN SULFATE 1 MG/G
OINTMENT TOPICAL ONCE
OUTPATIENT
Start: 2024-07-10 | End: 2024-07-10

## 2024-07-10 RX ORDER — LIDOCAINE HYDROCHLORIDE 40 MG/ML
SOLUTION TOPICAL ONCE
OUTPATIENT
Start: 2024-07-10 | End: 2024-07-10

## 2024-07-10 RX ORDER — LIDOCAINE HYDROCHLORIDE 40 MG/ML
SOLUTION TOPICAL ONCE
Status: COMPLETED | OUTPATIENT
Start: 2024-07-10 | End: 2024-07-10

## 2024-07-10 RX ORDER — LIDOCAINE 40 MG/G
CREAM TOPICAL ONCE
OUTPATIENT
Start: 2024-07-10 | End: 2024-07-10

## 2024-07-10 RX ORDER — SODIUM CHLOR/HYPOCHLOROUS ACID 0.033 %
SOLUTION, IRRIGATION IRRIGATION ONCE
OUTPATIENT
Start: 2024-07-10 | End: 2024-07-10

## 2024-07-10 RX ADMIN — LIDOCAINE HYDROCHLORIDE 15 ML: 40 SOLUTION TOPICAL at 08:14

## 2024-07-10 ASSESSMENT — PAIN DESCRIPTION - ORIENTATION: ORIENTATION: LEFT

## 2024-07-10 ASSESSMENT — PAIN DESCRIPTION - DESCRIPTORS: DESCRIPTORS: STABBING

## 2024-07-10 ASSESSMENT — PAIN - FUNCTIONAL ASSESSMENT: PAIN_FUNCTIONAL_ASSESSMENT: PREVENTS OR INTERFERES SOME ACTIVE ACTIVITIES AND ADLS

## 2024-07-10 ASSESSMENT — PAIN DESCRIPTION - LOCATION: LOCATION: LEG

## 2024-07-10 ASSESSMENT — PAIN DESCRIPTION - FREQUENCY: FREQUENCY: CONTINUOUS

## 2024-07-10 ASSESSMENT — PAIN SCALES - GENERAL: PAINLEVEL_OUTOF10: 8

## 2024-07-10 ASSESSMENT — PAIN DESCRIPTION - PAIN TYPE: TYPE: CHRONIC PAIN

## 2024-07-10 ASSESSMENT — PAIN DESCRIPTION - ONSET: ONSET: ON-GOING
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
## 2024-07-12 NOTE — DISCHARGE INSTRUCTIONS
Visit Discharge/Physician Orders     Discharge condition: Stable     Assessment of pain at discharge:yes      Anesthetic used: 4% Lido Soln     Discharge to: Home     Left via:Private automobile     Accompanied by: accompanied by family     ECF/HHA: [facility]  (ok for kerlix and coban when supply of Profore runs out)       Dressing Orders: LEFT LEG WOUND(BLOCKED): Cleanse with normal saline, calcium alginate, apply Profore. Change M-W(@[facility])/F       *Do not wet calcium alginate when removing*      Treatment Orders: FOLLOW NUTRITIOUS DIET. CHOOSE FOODS HIGH IN PROTEIN -CHICKEN- FISH-AND EGGS,  CHOOSE FOODS HIGH IN VITAMIN C.   MULTIVITAMIN DAILY.       STOP SMOKING     WBAT to Left Heel      [facility] followup visit :1 week ______________________________  (Please note your next appointment above and if you are unable to keep, kindly give a 24 hour notice. Thank you.)     Physician signature:__________________________      If you experience any of the following, please call the Wound Care Center during business hours:     * Increase in Pain  * Temperature over 101  * Increase in drainage from your wound  * Drainage with a foul odor  * Bleeding  * Increase in swelling  * Need for compression bandage changes due to slippage, breakthrough drainage.     If you need medical attention outside of the business hours of the Wound Care Centers please contact your PCP or go to the nearest emergency room.

## 2024-07-17 ENCOUNTER — HOSPITAL ENCOUNTER (OUTPATIENT)
Dept: WOUND CARE | Age: 69
Discharge: HOME | End: 2024-07-17
Attending: SURGERY
Payer: MEDICARE

## 2024-07-17 VITALS
TEMPERATURE: 98 F | SYSTOLIC BLOOD PRESSURE: 153 MMHG | WEIGHT: 250 LBS | BODY MASS INDEX: 35 KG/M2 | DIASTOLIC BLOOD PRESSURE: 72 MMHG | HEIGHT: 71 IN | HEART RATE: 77 BPM

## 2024-07-17 DIAGNOSIS — L89.624 PRESSURE INJURY OF LEFT HEEL, STAGE 4 (HCC): Primary | ICD-10-CM

## 2024-07-17 DIAGNOSIS — I87.2 VENOUS STASIS ULCER OF LEFT CALF WITH FAT LAYER EXPOSED WITHOUT VARICOSE VEINS (HCC): ICD-10-CM

## 2024-07-17 DIAGNOSIS — L97.222 VENOUS STASIS ULCER OF LEFT CALF WITH FAT LAYER EXPOSED WITHOUT VARICOSE VEINS (HCC): ICD-10-CM

## 2024-07-17 PROCEDURE — 11042 DBRDMT SUBQ TIS 1ST 20SQCM/<: CPT | Performed by: SURGERY

## 2024-07-17 PROCEDURE — 11045 DBRDMT SUBQ TISS EACH ADDL: CPT

## 2024-07-17 PROCEDURE — 11045 DBRDMT SUBQ TISS EACH ADDL: CPT | Performed by: SURGERY

## 2024-07-17 PROCEDURE — 11042 DBRDMT SUBQ TIS 1ST 20SQCM/<: CPT

## 2024-07-17 RX ORDER — OXYCODONE HYDROCHLORIDE 5 MG/1
5 TABLET ORAL EVERY 6 HOURS PRN
Qty: 28 TABLET | Refills: 0 | Status: SHIPPED | OUTPATIENT
Start: 2024-07-17 | End: 2024-07-24

## 2024-07-17 RX ORDER — TRIAMCINOLONE ACETONIDE 1 MG/G
OINTMENT TOPICAL ONCE
OUTPATIENT
Start: 2024-07-17 | End: 2024-07-17

## 2024-07-17 RX ORDER — IBUPROFEN 200 MG
TABLET ORAL ONCE
OUTPATIENT
Start: 2024-07-17 | End: 2024-07-17

## 2024-07-17 RX ORDER — LIDOCAINE 50 MG/G
OINTMENT TOPICAL ONCE
OUTPATIENT
Start: 2024-07-17 | End: 2024-07-17

## 2024-07-17 RX ORDER — BACITRACIN ZINC AND POLYMYXIN B SULFATE 500; 1000 [USP'U]/G; [USP'U]/G
OINTMENT TOPICAL ONCE
OUTPATIENT
Start: 2024-07-17 | End: 2024-07-17

## 2024-07-17 RX ORDER — BACITRACIN ZINC 500 [USP'U]/G
OINTMENT TOPICAL ONCE
OUTPATIENT
Start: 2024-07-17 | End: 2024-07-17

## 2024-07-17 RX ORDER — LIDOCAINE 40 MG/G
CREAM TOPICAL ONCE
OUTPATIENT
Start: 2024-07-17 | End: 2024-07-17

## 2024-07-17 RX ORDER — LIDOCAINE HYDROCHLORIDE 40 MG/ML
SOLUTION TOPICAL ONCE
OUTPATIENT
Start: 2024-07-17 | End: 2024-07-17

## 2024-07-17 RX ORDER — SODIUM CHLOR/HYPOCHLOROUS ACID 0.033 %
SOLUTION, IRRIGATION IRRIGATION ONCE
OUTPATIENT
Start: 2024-07-17 | End: 2024-07-17

## 2024-07-17 RX ORDER — GENTAMICIN SULFATE 1 MG/G
OINTMENT TOPICAL ONCE
OUTPATIENT
Start: 2024-07-17 | End: 2024-07-17

## 2024-07-17 RX ORDER — LIDOCAINE HYDROCHLORIDE 40 MG/ML
SOLUTION TOPICAL ONCE
Status: COMPLETED | OUTPATIENT
Start: 2024-07-17 | End: 2024-07-17

## 2024-07-17 RX ORDER — CLOBETASOL PROPIONATE 0.5 MG/G
OINTMENT TOPICAL ONCE
OUTPATIENT
Start: 2024-07-17 | End: 2024-07-17

## 2024-07-17 RX ORDER — LIDOCAINE HYDROCHLORIDE 20 MG/ML
JELLY TOPICAL ONCE
OUTPATIENT
Start: 2024-07-17 | End: 2024-07-17

## 2024-07-17 RX ORDER — BETAMETHASONE DIPROPIONATE 0.5 MG/G
CREAM TOPICAL ONCE
OUTPATIENT
Start: 2024-07-17 | End: 2024-07-17

## 2024-07-17 RX ADMIN — LIDOCAINE HYDROCHLORIDE 5 ML: 40 SOLUTION TOPICAL at 08:01

## 2024-07-17 ASSESSMENT — PAIN DESCRIPTION - ORIENTATION: ORIENTATION: LEFT

## 2024-07-17 ASSESSMENT — PAIN DESCRIPTION - DESCRIPTORS: DESCRIPTORS: ACHING

## 2024-07-17 ASSESSMENT — PAIN SCALES - GENERAL: PAINLEVEL_OUTOF10: 4

## 2024-07-17 ASSESSMENT — PAIN DESCRIPTION - LOCATION: LOCATION: LEG
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
## 2024-07-23 NOTE — DISCHARGE INSTRUCTIONS
Visit Discharge/Physician Orders     Discharge condition: Stable     Assessment of pain at discharge:yes      Anesthetic used: 4% Lido Soln     Discharge to: Home     Left via:Private automobile     Accompanied by: accompanied by family     ECF/HHA: [facility]  (ok for kerlix and coban when supply of Profore runs out)  *New order*      Dressing Orders: LEFT LEG WOUND(BLOCKED): Cleanse with normal saline, apply drawtex, apply Profore. Change M-W(@[facility])/F     Treatment Orders: FOLLOW NUTRITIOUS DIET. CHOOSE FOODS HIGH IN PROTEIN -CHICKEN- FISH-AND EGGS,  CHOOSE FOODS HIGH IN VITAMIN C.   MULTIVITAMIN DAILY.       STOP SMOKING     WBAT to Left Heel      [facility] followup visit :1 week ______________________________  (Please note your next appointment above and if you are unable to keep, kindly give a 24 hour notice. Thank you.)     Physician signature:__________________________      If you experience any of the following, please call the Wound Care Center during business hours:     * Increase in Pain  * Temperature over 101  * Increase in drainage from your wound  * Drainage with a foul odor  * Bleeding  * Increase in swelling  * Need for compression bandage changes due to slippage, breakthrough drainage.     If you need medical attention outside of the business hours of the Wound Care Centers please contact your PCP or go to the nearest emergency room.

## 2024-07-24 ENCOUNTER — HOSPITAL ENCOUNTER (OUTPATIENT)
Dept: WOUND CARE | Age: 69
Discharge: HOME | End: 2024-07-24
Attending: SURGERY
Payer: MEDICARE

## 2024-07-24 VITALS
DIASTOLIC BLOOD PRESSURE: 67 MMHG | BODY MASS INDEX: 35 KG/M2 | HEIGHT: 71 IN | SYSTOLIC BLOOD PRESSURE: 137 MMHG | WEIGHT: 250 LBS | HEART RATE: 74 BPM | TEMPERATURE: 97.6 F

## 2024-07-24 DIAGNOSIS — I87.2 VENOUS STASIS ULCER OF LEFT CALF WITH FAT LAYER EXPOSED WITHOUT VARICOSE VEINS (HCC): ICD-10-CM

## 2024-07-24 DIAGNOSIS — L97.222 VENOUS STASIS ULCER OF LEFT CALF WITH FAT LAYER EXPOSED WITHOUT VARICOSE VEINS (HCC): ICD-10-CM

## 2024-07-24 DIAGNOSIS — L89.624 PRESSURE INJURY OF LEFT HEEL, STAGE 4 (HCC): Primary | ICD-10-CM

## 2024-07-24 PROCEDURE — 11042 DBRDMT SUBQ TIS 1ST 20SQCM/<: CPT

## 2024-07-24 PROCEDURE — 11045 DBRDMT SUBQ TISS EACH ADDL: CPT

## 2024-07-24 PROCEDURE — 11045 DBRDMT SUBQ TISS EACH ADDL: CPT | Performed by: SURGERY

## 2024-07-24 PROCEDURE — 11042 DBRDMT SUBQ TIS 1ST 20SQCM/<: CPT | Performed by: SURGERY

## 2024-07-24 RX ORDER — LIDOCAINE HYDROCHLORIDE 20 MG/ML
JELLY TOPICAL ONCE
OUTPATIENT
Start: 2024-07-24 | End: 2024-07-24

## 2024-07-24 RX ORDER — GENTAMICIN SULFATE 1 MG/G
OINTMENT TOPICAL ONCE
OUTPATIENT
Start: 2024-07-24 | End: 2024-07-24

## 2024-07-24 RX ORDER — SODIUM CHLOR/HYPOCHLOROUS ACID 0.033 %
SOLUTION, IRRIGATION IRRIGATION ONCE
OUTPATIENT
Start: 2024-07-24 | End: 2024-07-24

## 2024-07-24 RX ORDER — LIDOCAINE 40 MG/G
CREAM TOPICAL ONCE
OUTPATIENT
Start: 2024-07-24 | End: 2024-07-24

## 2024-07-24 RX ORDER — LIDOCAINE 50 MG/G
OINTMENT TOPICAL ONCE
OUTPATIENT
Start: 2024-07-24 | End: 2024-07-24

## 2024-07-24 RX ORDER — CLOBETASOL PROPIONATE 0.5 MG/G
OINTMENT TOPICAL ONCE
OUTPATIENT
Start: 2024-07-24 | End: 2024-07-24

## 2024-07-24 RX ORDER — LIDOCAINE HYDROCHLORIDE 40 MG/ML
SOLUTION TOPICAL ONCE
Status: COMPLETED | OUTPATIENT
Start: 2024-07-24 | End: 2024-07-24

## 2024-07-24 RX ORDER — BETAMETHASONE DIPROPIONATE 0.5 MG/G
CREAM TOPICAL ONCE
OUTPATIENT
Start: 2024-07-24 | End: 2024-07-24

## 2024-07-24 RX ORDER — IBUPROFEN 200 MG
TABLET ORAL ONCE
OUTPATIENT
Start: 2024-07-24 | End: 2024-07-24

## 2024-07-24 RX ORDER — TRIAMCINOLONE ACETONIDE 1 MG/G
OINTMENT TOPICAL ONCE
OUTPATIENT
Start: 2024-07-24 | End: 2024-07-24

## 2024-07-24 RX ORDER — LIDOCAINE HYDROCHLORIDE 40 MG/ML
SOLUTION TOPICAL ONCE
OUTPATIENT
Start: 2024-07-24 | End: 2024-07-24

## 2024-07-24 RX ORDER — BACITRACIN ZINC 500 [USP'U]/G
OINTMENT TOPICAL ONCE
OUTPATIENT
Start: 2024-07-24 | End: 2024-07-24

## 2024-07-24 RX ORDER — BACITRACIN ZINC AND POLYMYXIN B SULFATE 500; 1000 [USP'U]/G; [USP'U]/G
OINTMENT TOPICAL ONCE
OUTPATIENT
Start: 2024-07-24 | End: 2024-07-24

## 2024-07-24 RX ADMIN — LIDOCAINE HYDROCHLORIDE 10 ML: 40 SOLUTION TOPICAL at 07:52

## 2024-07-24 NOTE — PROGRESS NOTES
your wound  * Drainage with a foul odor  * Bleeding  * Increase in swelling  * Need for compression bandage changes due to slippage, breakthrough drainage.     If you need medical attention outside of the business hours of the Wound Care Centers please contact your PCP or go to the nearest emergency room.              Electronically signed by Katerina Wild MD on 7/24/2024 at 8:41 AM

## 2024-07-26 NOTE — DISCHARGE INSTRUCTIONS
Visit Discharge/Physician Orders     Discharge condition: Stable     Assessment of pain at discharge:yes      Anesthetic used: 4% Lido Soln     Discharge to: Home     Left via:Private automobile     Accompanied by: accompanied by family     ECF/HHA: [facility]  (ok for kerlix and coban when supply of Profore runs out-*must be wrapped from base of 5th toe to just below the knee*)       Dressing Orders: LEFT LEG WOUND(BLOCKED): Cleanse with normal saline, apply drawtex, ABD pad and  profore wrap. Change M-W(@[facility])/F     Treatment Orders: FOLLOW NUTRITIOUS DIET. CHOOSE FOODS HIGH IN PROTEIN -CHICKEN- FISH-AND EGGS,  CHOOSE FOODS HIGH IN VITAMIN C.   MULTIVITAMIN DAILY.       STOP SMOKING     WBAT to Left Heel      [facility] followup visit :1 week ______________________________  (Please note your next appointment above and if you are unable to keep, kindly give a 24 hour notice. Thank you.)     Physician signature:__________________________      If you experience any of the following, please call the Wound Care Center during business hours:     * Increase in Pain  * Temperature over 101  * Increase in drainage from your wound  * Drainage with a foul odor  * Bleeding  * Increase in swelling  * Need for compression bandage changes due to slippage, breakthrough drainage.     If you need medical attention outside of the business hours of the Wound Care Centers please contact your PCP or go to the nearest emergency room.

## 2024-07-31 ENCOUNTER — HOSPITAL ENCOUNTER (OUTPATIENT)
Dept: WOUND CARE | Age: 69
Discharge: HOME | End: 2024-07-31
Attending: SURGERY
Payer: MEDICARE

## 2024-07-31 VITALS
TEMPERATURE: 96.6 F | RESPIRATION RATE: 20 BRPM | HEIGHT: 71 IN | WEIGHT: 250 LBS | SYSTOLIC BLOOD PRESSURE: 145 MMHG | DIASTOLIC BLOOD PRESSURE: 92 MMHG | BODY MASS INDEX: 35 KG/M2 | HEART RATE: 72 BPM

## 2024-07-31 DIAGNOSIS — I87.2 VENOUS STASIS ULCER OF LEFT CALF WITH FAT LAYER EXPOSED WITHOUT VARICOSE VEINS (HCC): ICD-10-CM

## 2024-07-31 DIAGNOSIS — L97.222 VENOUS STASIS ULCER OF LEFT CALF WITH FAT LAYER EXPOSED WITHOUT VARICOSE VEINS (HCC): ICD-10-CM

## 2024-07-31 DIAGNOSIS — L89.624 PRESSURE INJURY OF LEFT HEEL, STAGE 4 (HCC): Primary | ICD-10-CM

## 2024-07-31 PROCEDURE — 11042 DBRDMT SUBQ TIS 1ST 20SQCM/<: CPT

## 2024-07-31 PROCEDURE — 11042 DBRDMT SUBQ TIS 1ST 20SQCM/<: CPT | Performed by: SURGERY

## 2024-07-31 PROCEDURE — 11045 DBRDMT SUBQ TISS EACH ADDL: CPT

## 2024-07-31 PROCEDURE — 11045 DBRDMT SUBQ TISS EACH ADDL: CPT | Performed by: SURGERY

## 2024-07-31 RX ORDER — LEVOTHYROXINE SODIUM 50 UG/1
50 CAPSULE ORAL DAILY
COMMUNITY
Start: 2024-05-24

## 2024-07-31 RX ORDER — LIDOCAINE HYDROCHLORIDE 20 MG/ML
JELLY TOPICAL ONCE
OUTPATIENT
Start: 2024-07-31 | End: 2024-07-31

## 2024-07-31 RX ORDER — GENTAMICIN SULFATE 1 MG/G
OINTMENT TOPICAL ONCE
OUTPATIENT
Start: 2024-07-31 | End: 2024-07-31

## 2024-07-31 RX ORDER — TRIAMCINOLONE ACETONIDE 1 MG/G
OINTMENT TOPICAL ONCE
OUTPATIENT
Start: 2024-07-31 | End: 2024-07-31

## 2024-07-31 RX ORDER — BETAMETHASONE DIPROPIONATE 0.5 MG/G
CREAM TOPICAL ONCE
OUTPATIENT
Start: 2024-07-31 | End: 2024-07-31

## 2024-07-31 RX ORDER — BACITRACIN ZINC 500 [USP'U]/G
OINTMENT TOPICAL ONCE
OUTPATIENT
Start: 2024-07-31 | End: 2024-07-31

## 2024-07-31 RX ORDER — LIDOCAINE 40 MG/G
CREAM TOPICAL ONCE
OUTPATIENT
Start: 2024-07-31 | End: 2024-07-31

## 2024-07-31 RX ORDER — LIDOCAINE HYDROCHLORIDE 40 MG/ML
SOLUTION TOPICAL ONCE
Status: COMPLETED | OUTPATIENT
Start: 2024-07-31 | End: 2024-07-31

## 2024-07-31 RX ORDER — LIDOCAINE HYDROCHLORIDE 40 MG/ML
SOLUTION TOPICAL ONCE
OUTPATIENT
Start: 2024-07-31 | End: 2024-07-31

## 2024-07-31 RX ORDER — CLOBETASOL PROPIONATE 0.5 MG/G
OINTMENT TOPICAL ONCE
OUTPATIENT
Start: 2024-07-31 | End: 2024-07-31

## 2024-07-31 RX ORDER — BACITRACIN ZINC AND POLYMYXIN B SULFATE 500; 1000 [USP'U]/G; [USP'U]/G
OINTMENT TOPICAL ONCE
OUTPATIENT
Start: 2024-07-31 | End: 2024-07-31

## 2024-07-31 RX ORDER — SODIUM CHLOR/HYPOCHLOROUS ACID 0.033 %
SOLUTION, IRRIGATION IRRIGATION ONCE
OUTPATIENT
Start: 2024-07-31 | End: 2024-07-31

## 2024-07-31 RX ORDER — IBUPROFEN 200 MG
TABLET ORAL ONCE
OUTPATIENT
Start: 2024-07-31 | End: 2024-07-31

## 2024-07-31 RX ORDER — OXYCODONE HYDROCHLORIDE 5 MG/1
5 TABLET ORAL EVERY 6 HOURS PRN
Qty: 28 TABLET | Refills: 0 | Status: SHIPPED | OUTPATIENT
Start: 2024-07-31 | End: 2024-08-07

## 2024-07-31 RX ORDER — LIDOCAINE 50 MG/G
OINTMENT TOPICAL ONCE
OUTPATIENT
Start: 2024-07-31 | End: 2024-07-31

## 2024-07-31 RX ADMIN — LIDOCAINE HYDROCHLORIDE 10 ML: 40 SOLUTION TOPICAL at 08:03

## 2024-07-31 ASSESSMENT — PAIN - FUNCTIONAL ASSESSMENT: PAIN_FUNCTIONAL_ASSESSMENT: PREVENTS OR INTERFERES SOME ACTIVE ACTIVITIES AND ADLS

## 2024-07-31 ASSESSMENT — PAIN DESCRIPTION - LOCATION: LOCATION: LEG

## 2024-07-31 ASSESSMENT — PAIN DESCRIPTION - PAIN TYPE: TYPE: CHRONIC PAIN

## 2024-07-31 ASSESSMENT — PAIN DESCRIPTION - FREQUENCY: FREQUENCY: CONTINUOUS

## 2024-07-31 ASSESSMENT — PAIN DESCRIPTION - ORIENTATION: ORIENTATION: LEFT

## 2024-07-31 ASSESSMENT — PAIN DESCRIPTION - ONSET: ONSET: ON-GOING

## 2024-07-31 ASSESSMENT — PAIN DESCRIPTION - DESCRIPTORS: DESCRIPTORS: ACHING

## 2024-07-31 ASSESSMENT — PAIN SCALES - GENERAL: PAINLEVEL_OUTOF10: 3
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
## 2024-08-05 NOTE — DISCHARGE INSTRUCTIONS
Visit Discharge/Physician Orders     Discharge condition: Stable     Assessment of pain at discharge:yes      Anesthetic used: 4% Lido Soln     Discharge to: Home     Left via:Private automobile     Accompanied by: accompanied by family     ECF/HHA: [facility]  (ok for kerlix and coban when supply of Profore runs out-*must be wrapped from base of 5th toe to just below the knee*)       Dressing Orders: LEFT LEG WOUND(BLOCKED): Cleanse with normal saline, apply drawtex, ABD pad and  profore wrap. Change M-W(@[facility])/F     Treatment Orders: FOLLOW NUTRITIOUS DIET. CHOOSE FOODS HIGH IN PROTEIN -CHICKEN- FISH-AND EGGS,  CHOOSE FOODS HIGH IN VITAMIN C.   MULTIVITAMIN DAILY.       STOP SMOKING     WBAT to Left Heel      [facility] followup visit :1 week ______________________________  (Please note your next appointment above and if you are unable to keep, kindly give a 24 hour notice. Thank you.)     Physician signature:__________________________      If you experience any of the following, please call the Wound Care Center during business hours:     * Increase in Pain  * Temperature over 101  * Increase in drainage from your wound  * Drainage with a foul odor  * Bleeding  * Increase in swelling  * Need for compression bandage changes due to slippage, breakthrough drainage.     If you need medical attention outside of the business hours of the Wound Care Centers please contact your PCP or go to the nearest emergency room.

## 2024-08-07 ENCOUNTER — HOSPITAL ENCOUNTER (OUTPATIENT)
Dept: WOUND CARE | Age: 69
Discharge: HOME | End: 2024-08-07
Attending: SURGERY
Payer: MEDICARE

## 2024-08-07 VITALS
RESPIRATION RATE: 22 BRPM | WEIGHT: 250 LBS | SYSTOLIC BLOOD PRESSURE: 167 MMHG | DIASTOLIC BLOOD PRESSURE: 84 MMHG | BODY MASS INDEX: 35 KG/M2 | TEMPERATURE: 96.3 F | HEART RATE: 72 BPM | HEIGHT: 71 IN

## 2024-08-07 DIAGNOSIS — I87.2 VENOUS STASIS ULCER OF LEFT CALF WITH FAT LAYER EXPOSED WITHOUT VARICOSE VEINS (HCC): ICD-10-CM

## 2024-08-07 DIAGNOSIS — L97.222 VENOUS STASIS ULCER OF LEFT CALF WITH FAT LAYER EXPOSED WITHOUT VARICOSE VEINS (HCC): ICD-10-CM

## 2024-08-07 DIAGNOSIS — L89.624 PRESSURE INJURY OF LEFT HEEL, STAGE 4 (HCC): Primary | ICD-10-CM

## 2024-08-07 PROBLEM — L03.119 CELLULITIS OF FOOT: Status: RESOLVED | Noted: 2024-02-27 | Resolved: 2024-08-07

## 2024-08-07 PROBLEM — E87.1 HYPONATREMIA: Status: RESOLVED | Noted: 2024-02-27 | Resolved: 2024-08-07

## 2024-08-07 PROBLEM — M72.6 NECROTIZING FASCIITIS (HCC): Status: RESOLVED | Noted: 2024-02-27 | Resolved: 2024-08-07

## 2024-08-07 PROBLEM — R79.89 LOW SERUM CORTISOL LEVEL: Status: RESOLVED | Noted: 2024-03-04 | Resolved: 2024-08-07

## 2024-08-07 PROCEDURE — 11045 DBRDMT SUBQ TISS EACH ADDL: CPT | Performed by: SURGERY

## 2024-08-07 PROCEDURE — 11042 DBRDMT SUBQ TIS 1ST 20SQCM/<: CPT

## 2024-08-07 PROCEDURE — 11045 DBRDMT SUBQ TISS EACH ADDL: CPT

## 2024-08-07 PROCEDURE — 11042 DBRDMT SUBQ TIS 1ST 20SQCM/<: CPT | Performed by: SURGERY

## 2024-08-07 RX ORDER — LIDOCAINE 50 MG/G
OINTMENT TOPICAL ONCE
OUTPATIENT
Start: 2024-08-07 | End: 2024-08-07

## 2024-08-07 RX ORDER — LIDOCAINE 40 MG/G
CREAM TOPICAL ONCE
OUTPATIENT
Start: 2024-08-07 | End: 2024-08-07

## 2024-08-07 RX ORDER — SODIUM CHLOR/HYPOCHLOROUS ACID 0.033 %
SOLUTION, IRRIGATION IRRIGATION ONCE
OUTPATIENT
Start: 2024-08-07 | End: 2024-08-07

## 2024-08-07 RX ORDER — IBUPROFEN 200 MG
TABLET ORAL ONCE
OUTPATIENT
Start: 2024-08-07 | End: 2024-08-07

## 2024-08-07 RX ORDER — LIDOCAINE HYDROCHLORIDE 40 MG/ML
SOLUTION TOPICAL ONCE
Status: COMPLETED | OUTPATIENT
Start: 2024-08-07 | End: 2024-08-07

## 2024-08-07 RX ORDER — CLOBETASOL PROPIONATE 0.5 MG/G
OINTMENT TOPICAL ONCE
OUTPATIENT
Start: 2024-08-07 | End: 2024-08-07

## 2024-08-07 RX ORDER — TRIAMCINOLONE ACETONIDE 1 MG/G
OINTMENT TOPICAL ONCE
OUTPATIENT
Start: 2024-08-07 | End: 2024-08-07

## 2024-08-07 RX ORDER — LIDOCAINE HYDROCHLORIDE 20 MG/ML
JELLY TOPICAL ONCE
OUTPATIENT
Start: 2024-08-07 | End: 2024-08-07

## 2024-08-07 RX ORDER — BETAMETHASONE DIPROPIONATE 0.5 MG/G
CREAM TOPICAL ONCE
OUTPATIENT
Start: 2024-08-07 | End: 2024-08-07

## 2024-08-07 RX ORDER — BACITRACIN ZINC 500 [USP'U]/G
OINTMENT TOPICAL ONCE
OUTPATIENT
Start: 2024-08-07 | End: 2024-08-07

## 2024-08-07 RX ORDER — LIDOCAINE HYDROCHLORIDE 40 MG/ML
SOLUTION TOPICAL ONCE
OUTPATIENT
Start: 2024-08-07 | End: 2024-08-07

## 2024-08-07 RX ORDER — BACITRACIN ZINC AND POLYMYXIN B SULFATE 500; 1000 [USP'U]/G; [USP'U]/G
OINTMENT TOPICAL ONCE
OUTPATIENT
Start: 2024-08-07 | End: 2024-08-07

## 2024-08-07 RX ORDER — GENTAMICIN SULFATE 1 MG/G
OINTMENT TOPICAL ONCE
OUTPATIENT
Start: 2024-08-07 | End: 2024-08-07

## 2024-08-07 RX ADMIN — LIDOCAINE HYDROCHLORIDE 7 ML: 40 SOLUTION TOPICAL at 08:02

## 2024-08-07 ASSESSMENT — PAIN SCALES - GENERAL: PAINLEVEL_OUTOF10: 3
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
## 2024-08-08 NOTE — DISCHARGE INSTRUCTIONS
Visit Discharge/Physician Orders     Discharge condition: Stable     Assessment of pain at discharge:yes      Anesthetic used: 4% Lido Soln     Discharge to: Home     Left via:Private automobile     Accompanied by: accompanied by family     ECF/HHA: [facility]  (ok for kerlix and coban when supply of Profore runs out-*must be wrapped from base of 5th toe to just below the knee*)       Dressing Orders: LEFT LEG WOUND(BLOCKED): Cleanse with normal saline, apply drawtex, ABD pad and  profore wrap. Change M-W(@[facility])/F     Treatment Orders: FOLLOW NUTRITIOUS DIET. CHOOSE FOODS HIGH IN PROTEIN -CHICKEN- FISH-AND EGGS,  CHOOSE FOODS HIGH IN VITAMIN C.   MULTIVITAMIN DAILY.       STOP SMOKING     WBAT to Left Heel      [facility] followup visit :1 week ______________________________  (Please note your next appointment above and if you are unable to keep, kindly give a 24 hour notice. Thank you.)     Physician signature:__________________________      If you experience any of the following, please call the Wound Care Center during business hours:     * Increase in Pain  * Temperature over 101  * Increase in drainage from your wound  * Drainage with a foul odor  * Bleeding  * Increase in swelling  * Need for compression bandage changes due to slippage, breakthrough drainage.     If you need medical attention outside of the business hours of the Wound Care Centers please contact your PCP or go to the nearest emergency room.

## 2024-08-14 ENCOUNTER — HOSPITAL ENCOUNTER (OUTPATIENT)
Dept: WOUND CARE | Age: 69
Discharge: HOME | End: 2024-08-14
Attending: SURGERY
Payer: MEDICARE

## 2024-08-14 VITALS
HEART RATE: 72 BPM | WEIGHT: 250 LBS | HEIGHT: 71 IN | TEMPERATURE: 97.1 F | SYSTOLIC BLOOD PRESSURE: 148 MMHG | BODY MASS INDEX: 35 KG/M2 | DIASTOLIC BLOOD PRESSURE: 80 MMHG | RESPIRATION RATE: 20 BRPM

## 2024-08-14 DIAGNOSIS — L97.222 VENOUS STASIS ULCER OF LEFT CALF WITH FAT LAYER EXPOSED WITHOUT VARICOSE VEINS (HCC): ICD-10-CM

## 2024-08-14 DIAGNOSIS — L89.624 PRESSURE INJURY OF LEFT HEEL, STAGE 4 (HCC): Primary | ICD-10-CM

## 2024-08-14 DIAGNOSIS — I87.2 VENOUS STASIS ULCER OF LEFT CALF WITH FAT LAYER EXPOSED WITHOUT VARICOSE VEINS (HCC): ICD-10-CM

## 2024-08-14 PROCEDURE — 11042 DBRDMT SUBQ TIS 1ST 20SQCM/<: CPT

## 2024-08-14 PROCEDURE — 11042 DBRDMT SUBQ TIS 1ST 20SQCM/<: CPT | Performed by: SURGERY

## 2024-08-14 RX ORDER — BETAMETHASONE DIPROPIONATE 0.5 MG/G
CREAM TOPICAL ONCE
OUTPATIENT
Start: 2024-08-14 | End: 2024-08-14

## 2024-08-14 RX ORDER — SODIUM CHLOR/HYPOCHLOROUS ACID 0.033 %
SOLUTION, IRRIGATION IRRIGATION ONCE
OUTPATIENT
Start: 2024-08-14 | End: 2024-08-14

## 2024-08-14 RX ORDER — CLOBETASOL PROPIONATE 0.5 MG/G
OINTMENT TOPICAL ONCE
OUTPATIENT
Start: 2024-08-14 | End: 2024-08-14

## 2024-08-14 RX ORDER — BACITRACIN ZINC 500 [USP'U]/G
OINTMENT TOPICAL ONCE
OUTPATIENT
Start: 2024-08-14 | End: 2024-08-14

## 2024-08-14 RX ORDER — TRIAMCINOLONE ACETONIDE 1 MG/G
OINTMENT TOPICAL ONCE
OUTPATIENT
Start: 2024-08-14 | End: 2024-08-14

## 2024-08-14 RX ORDER — LIDOCAINE HYDROCHLORIDE 20 MG/ML
JELLY TOPICAL ONCE
OUTPATIENT
Start: 2024-08-14 | End: 2024-08-14

## 2024-08-14 RX ORDER — LIDOCAINE HYDROCHLORIDE 40 MG/ML
SOLUTION TOPICAL ONCE
Status: COMPLETED | OUTPATIENT
Start: 2024-08-14 | End: 2024-08-14

## 2024-08-14 RX ORDER — IBUPROFEN 200 MG
TABLET ORAL ONCE
OUTPATIENT
Start: 2024-08-14 | End: 2024-08-14

## 2024-08-14 RX ORDER — BACITRACIN ZINC AND POLYMYXIN B SULFATE 500; 1000 [USP'U]/G; [USP'U]/G
OINTMENT TOPICAL ONCE
OUTPATIENT
Start: 2024-08-14 | End: 2024-08-14

## 2024-08-14 RX ORDER — LIDOCAINE HYDROCHLORIDE 40 MG/ML
SOLUTION TOPICAL ONCE
OUTPATIENT
Start: 2024-08-14 | End: 2024-08-14

## 2024-08-14 RX ORDER — LIDOCAINE 50 MG/G
OINTMENT TOPICAL ONCE
OUTPATIENT
Start: 2024-08-14 | End: 2024-08-14

## 2024-08-14 RX ORDER — GENTAMICIN SULFATE 1 MG/G
OINTMENT TOPICAL ONCE
OUTPATIENT
Start: 2024-08-14 | End: 2024-08-14

## 2024-08-14 RX ORDER — LIDOCAINE 40 MG/G
CREAM TOPICAL ONCE
OUTPATIENT
Start: 2024-08-14 | End: 2024-08-14

## 2024-08-14 RX ADMIN — LIDOCAINE HYDROCHLORIDE 10 ML: 40 SOLUTION TOPICAL at 08:10
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
## 2024-08-15 NOTE — DISCHARGE INSTRUCTIONS
Visit Discharge/Physician Orders     Discharge condition: Stable     Assessment of pain at discharge:yes      Anesthetic used: 4% Lido Soln     Discharge to: Home     Left via:Private automobile     Accompanied by: accompanied by family     ECF/HHA: [facility]  (ok for kerlix and coban when supply of Profore runs out-*must be wrapped from base of 5th toe to just below the knee*)       Dressing Orders: LEFT LEG WOUND(BLOCKED): Cleanse with normal saline, apply drawtex, ABD pad and  profore wrap. Change M-W(@[facility])/F     Treatment Orders: FOLLOW NUTRITIOUS DIET. CHOOSE FOODS HIGH IN PROTEIN -CHICKEN- FISH-AND EGGS,  CHOOSE FOODS HIGH IN VITAMIN C.   MULTIVITAMIN DAILY.       STOP SMOKING     WBAT to Left Heel      [facility] followup visit :1 week ______________________________  (Please note your next appointment above and if you are unable to keep, kindly give a 24 hour notice. Thank you.)     Physician signature:__________________________      If you experience any of the following, please call the Wound Care Center during business hours:     * Increase in Pain  * Temperature over 101  * Increase in drainage from your wound  * Drainage with a foul odor  * Bleeding  * Increase in swelling  * Need for compression bandage changes due to slippage, breakthrough drainage.     If you need medical attention outside of the business hours of the Wound Care Centers please contact your PCP or go to the nearest emergency room.

## 2024-08-21 ENCOUNTER — HOSPITAL ENCOUNTER (OUTPATIENT)
Dept: WOUND CARE | Age: 69
Discharge: HOME | End: 2024-08-21
Attending: SURGERY
Payer: MEDICARE

## 2024-08-21 VITALS
DIASTOLIC BLOOD PRESSURE: 83 MMHG | TEMPERATURE: 98 F | WEIGHT: 250 LBS | SYSTOLIC BLOOD PRESSURE: 163 MMHG | RESPIRATION RATE: 20 BRPM | HEART RATE: 77 BPM | HEIGHT: 71 IN | BODY MASS INDEX: 35 KG/M2

## 2024-08-21 DIAGNOSIS — L89.624 PRESSURE INJURY OF LEFT HEEL, STAGE 4 (HCC): Primary | ICD-10-CM

## 2024-08-21 DIAGNOSIS — I87.2 VENOUS STASIS ULCER OF LEFT CALF WITH FAT LAYER EXPOSED WITHOUT VARICOSE VEINS (HCC): ICD-10-CM

## 2024-08-21 DIAGNOSIS — L97.222 VENOUS STASIS ULCER OF LEFT CALF WITH FAT LAYER EXPOSED WITHOUT VARICOSE VEINS (HCC): ICD-10-CM

## 2024-08-21 PROCEDURE — 11042 DBRDMT SUBQ TIS 1ST 20SQCM/<: CPT | Performed by: SURGERY

## 2024-08-21 PROCEDURE — 11042 DBRDMT SUBQ TIS 1ST 20SQCM/<: CPT

## 2024-08-21 RX ORDER — CLOBETASOL PROPIONATE 0.5 MG/G
OINTMENT TOPICAL ONCE
OUTPATIENT
Start: 2024-08-21 | End: 2024-08-21

## 2024-08-21 RX ORDER — LIDOCAINE HYDROCHLORIDE 20 MG/ML
JELLY TOPICAL ONCE
OUTPATIENT
Start: 2024-08-21 | End: 2024-08-21

## 2024-08-21 RX ORDER — LIDOCAINE 40 MG/G
CREAM TOPICAL ONCE
OUTPATIENT
Start: 2024-08-21 | End: 2024-08-21

## 2024-08-21 RX ORDER — IBUPROFEN 200 MG
TABLET ORAL ONCE
OUTPATIENT
Start: 2024-08-21 | End: 2024-08-21

## 2024-08-21 RX ORDER — LIDOCAINE HYDROCHLORIDE 40 MG/ML
SOLUTION TOPICAL ONCE
OUTPATIENT
Start: 2024-08-21 | End: 2024-08-21

## 2024-08-21 RX ORDER — BETAMETHASONE DIPROPIONATE 0.5 MG/G
CREAM TOPICAL ONCE
OUTPATIENT
Start: 2024-08-21 | End: 2024-08-21

## 2024-08-21 RX ORDER — LIDOCAINE 50 MG/G
OINTMENT TOPICAL ONCE
OUTPATIENT
Start: 2024-08-21 | End: 2024-08-21

## 2024-08-21 RX ORDER — BACITRACIN ZINC 500 [USP'U]/G
OINTMENT TOPICAL ONCE
OUTPATIENT
Start: 2024-08-21 | End: 2024-08-21

## 2024-08-21 RX ORDER — TRIAMCINOLONE ACETONIDE 1 MG/G
OINTMENT TOPICAL ONCE
OUTPATIENT
Start: 2024-08-21 | End: 2024-08-21

## 2024-08-21 RX ORDER — SODIUM CHLOR/HYPOCHLOROUS ACID 0.033 %
SOLUTION, IRRIGATION IRRIGATION ONCE
OUTPATIENT
Start: 2024-08-21 | End: 2024-08-21

## 2024-08-21 RX ORDER — BACITRACIN ZINC AND POLYMYXIN B SULFATE 500; 1000 [USP'U]/G; [USP'U]/G
OINTMENT TOPICAL ONCE
OUTPATIENT
Start: 2024-08-21 | End: 2024-08-21

## 2024-08-21 RX ORDER — GENTAMICIN SULFATE 1 MG/G
OINTMENT TOPICAL ONCE
OUTPATIENT
Start: 2024-08-21 | End: 2024-08-21

## 2024-08-21 RX ORDER — LIDOCAINE HYDROCHLORIDE 40 MG/ML
SOLUTION TOPICAL ONCE
Status: COMPLETED | OUTPATIENT
Start: 2024-08-21 | End: 2024-08-21

## 2024-08-21 RX ADMIN — LIDOCAINE HYDROCHLORIDE 5 ML: 40 SOLUTION TOPICAL at 08:08

## 2024-08-21 ASSESSMENT — PAIN DESCRIPTION - FREQUENCY: FREQUENCY: CONTINUOUS

## 2024-08-21 ASSESSMENT — PAIN DESCRIPTION - ORIENTATION: ORIENTATION: LEFT

## 2024-08-21 ASSESSMENT — PAIN DESCRIPTION - PAIN TYPE: TYPE: CHRONIC PAIN

## 2024-08-21 ASSESSMENT — PAIN SCALES - GENERAL: PAINLEVEL_OUTOF10: 3

## 2024-08-21 ASSESSMENT — PAIN DESCRIPTION - DESCRIPTORS: DESCRIPTORS: ACHING

## 2024-08-21 ASSESSMENT — PAIN DESCRIPTION - LOCATION: LOCATION: LEG

## 2024-08-21 NOTE — PLAN OF CARE
Problem: Cognitive:  Goal: Knowledge of wound care  Description: Knowledge of wound care  Outcome: Progressing  Goal: Understands risk factors for wounds  Description: Understands risk factors for wounds  Outcome: Progressing     Problem: Wound:  Goal: Will show signs of wound healing; wound closure and no evidence of infection  Description: Will show signs of wound healing; wound closure and no evidence of infection  Outcome: Progressing     Problem: Compression therapy:  Goal: Will be free from complications associated with compression therapy  Description: Will be free from complications associated with compression therapy  Outcome: Progressing

## 2024-08-21 NOTE — PROGRESS NOTES
Increase in Pain  * Temperature over 101  * Increase in drainage from your wound  * Drainage with a foul odor  * Bleeding  * Increase in swelling  * Need for compression bandage changes due to slippage, breakthrough drainage.     If you need medical attention outside of the business hours of the Wound Care Centers please contact your PCP or go to the nearest emergency room.              Electronically signed by Katerina Wild MD on 8/21/2024 at 10:01 AM

## 2024-08-22 NOTE — DISCHARGE INSTRUCTIONS
Visit Discharge/Physician Orders     Discharge condition: Stable     Assessment of pain at discharge:yes      Anesthetic used: 4% Lido Soln     Discharge to: Home     Left via:Private automobile     Accompanied by: accompanied by family     ECF/HHA: [facility]  (ok for kerlix and coban when supply of Profore runs out-*must be wrapped from base of 5th toe to just below the knee*)       Dressing Orders: LEFT LEG WOUND(BLOCKED): Cleanse with normal saline, apply drawtex, ABD pad and  profore wrap. Change M-W(@[facility])/F     Treatment Orders: FOLLOW NUTRITIOUS DIET. CHOOSE FOODS HIGH IN PROTEIN -CHICKEN- FISH-AND EGGS,  CHOOSE FOODS HIGH IN VITAMIN C.   MULTIVITAMIN DAILY.       STOP SMOKING     WBAT to Left Heel      [facility] followup visit :1 week ______________________________  (Please note your next appointment above and if you are unable to keep, kindly give a 24 hour notice. Thank you.)     Physician signature:__________________________      If you experience any of the following, please call the Wound Care Center during business hours:     * Increase in Pain  * Temperature over 101  * Increase in drainage from your wound  * Drainage with a foul odor  * Bleeding  * Increase in swelling  * Need for compression bandage changes due to slippage, breakthrough drainage.     If you need medical attention outside of the business hours of the Wound Care Centers please contact your PCP or go to the nearest emergency room.

## 2024-08-28 ENCOUNTER — HOSPITAL ENCOUNTER (OUTPATIENT)
Dept: WOUND CARE | Age: 69
Discharge: HOME | End: 2024-08-28
Attending: SURGERY
Payer: MEDICARE

## 2024-08-28 VITALS
TEMPERATURE: 96.7 F | SYSTOLIC BLOOD PRESSURE: 157 MMHG | RESPIRATION RATE: 20 BRPM | DIASTOLIC BLOOD PRESSURE: 86 MMHG | HEART RATE: 72 BPM

## 2024-08-28 DIAGNOSIS — I87.2 VENOUS STASIS ULCER OF LEFT CALF WITH FAT LAYER EXPOSED WITHOUT VARICOSE VEINS (HCC): ICD-10-CM

## 2024-08-28 DIAGNOSIS — L89.624 PRESSURE INJURY OF LEFT HEEL, STAGE 4 (HCC): Primary | ICD-10-CM

## 2024-08-28 DIAGNOSIS — L97.222 VENOUS STASIS ULCER OF LEFT CALF WITH FAT LAYER EXPOSED WITHOUT VARICOSE VEINS (HCC): ICD-10-CM

## 2024-08-28 PROCEDURE — 11042 DBRDMT SUBQ TIS 1ST 20SQCM/<: CPT

## 2024-08-28 PROCEDURE — 11042 DBRDMT SUBQ TIS 1ST 20SQCM/<: CPT | Performed by: SURGERY

## 2024-08-28 RX ORDER — BETAMETHASONE DIPROPIONATE 0.5 MG/G
CREAM TOPICAL ONCE
OUTPATIENT
Start: 2024-08-28 | End: 2024-08-28

## 2024-08-28 RX ORDER — BACITRACIN ZINC AND POLYMYXIN B SULFATE 500; 1000 [USP'U]/G; [USP'U]/G
OINTMENT TOPICAL ONCE
OUTPATIENT
Start: 2024-08-28 | End: 2024-08-28

## 2024-08-28 RX ORDER — LIDOCAINE 40 MG/G
CREAM TOPICAL ONCE
OUTPATIENT
Start: 2024-08-28 | End: 2024-08-28

## 2024-08-28 RX ORDER — TRIAMCINOLONE ACETONIDE 1 MG/G
OINTMENT TOPICAL ONCE
OUTPATIENT
Start: 2024-08-28 | End: 2024-08-28

## 2024-08-28 RX ORDER — LIDOCAINE 50 MG/G
OINTMENT TOPICAL ONCE
OUTPATIENT
Start: 2024-08-28 | End: 2024-08-28

## 2024-08-28 RX ORDER — GENTAMICIN SULFATE 1 MG/G
OINTMENT TOPICAL ONCE
OUTPATIENT
Start: 2024-08-28 | End: 2024-08-28

## 2024-08-28 RX ORDER — LIDOCAINE HYDROCHLORIDE 40 MG/ML
SOLUTION TOPICAL ONCE
OUTPATIENT
Start: 2024-08-28 | End: 2024-08-28

## 2024-08-28 RX ORDER — MUPIROCIN 20 MG/G
OINTMENT TOPICAL ONCE
OUTPATIENT
Start: 2024-08-28 | End: 2024-08-28

## 2024-08-28 RX ORDER — LIDOCAINE HYDROCHLORIDE 40 MG/ML
SOLUTION TOPICAL ONCE
Status: COMPLETED | OUTPATIENT
Start: 2024-08-28 | End: 2024-08-28

## 2024-08-28 RX ORDER — LIDOCAINE HYDROCHLORIDE 20 MG/ML
JELLY TOPICAL ONCE
OUTPATIENT
Start: 2024-08-28 | End: 2024-08-28

## 2024-08-28 RX ORDER — NEOMYCIN/BACITRACIN/POLYMYXINB 3.5-400-5K
OINTMENT (GRAM) TOPICAL ONCE
OUTPATIENT
Start: 2024-08-28 | End: 2024-08-28

## 2024-08-28 RX ORDER — SODIUM CHLOR/HYPOCHLOROUS ACID 0.033 %
SOLUTION, IRRIGATION IRRIGATION ONCE
OUTPATIENT
Start: 2024-08-28 | End: 2024-08-28

## 2024-08-28 RX ORDER — CLOBETASOL PROPIONATE 0.5 MG/G
OINTMENT TOPICAL ONCE
OUTPATIENT
Start: 2024-08-28 | End: 2024-08-28

## 2024-08-28 RX ORDER — BACITRACIN ZINC 500 [USP'U]/G
OINTMENT TOPICAL ONCE
OUTPATIENT
Start: 2024-08-28 | End: 2024-08-28

## 2024-08-28 RX ORDER — SILVER SULFADIAZINE 10 MG/G
CREAM TOPICAL ONCE
OUTPATIENT
Start: 2024-08-28 | End: 2024-08-28

## 2024-08-28 RX ADMIN — LIDOCAINE HYDROCHLORIDE: 40 SOLUTION TOPICAL at 08:04

## 2024-08-28 ASSESSMENT — PAIN DESCRIPTION - FREQUENCY: FREQUENCY: INTERMITTENT

## 2024-08-28 ASSESSMENT — PAIN DESCRIPTION - ORIENTATION: ORIENTATION: LEFT

## 2024-08-28 ASSESSMENT — PAIN - FUNCTIONAL ASSESSMENT: PAIN_FUNCTIONAL_ASSESSMENT: PREVENTS OR INTERFERES SOME ACTIVE ACTIVITIES AND ADLS

## 2024-08-28 ASSESSMENT — PAIN DESCRIPTION - PAIN TYPE: TYPE: CHRONIC PAIN

## 2024-08-28 ASSESSMENT — PAIN DESCRIPTION - LOCATION: LOCATION: LEG

## 2024-08-28 ASSESSMENT — PAIN SCALES - GENERAL: PAINLEVEL_OUTOF10: 3

## 2024-08-28 ASSESSMENT — PAIN DESCRIPTION - ONSET: ONSET: ON-GOING

## 2024-08-28 ASSESSMENT — PAIN DESCRIPTION - DESCRIPTORS: DESCRIPTORS: DISCOMFORT

## 2024-08-28 NOTE — PROGRESS NOTES
roxicodone 5 mg #28   Contract signed  Pt understands I will not prescribe in future if he takes pain medication from other providers  6/12/24  L heel 2.55  L calf 79 - larger but appearance much improved  6/19/24  L heel smaller but measuring same 2.55  L calf 59  Script for orthotic for left heel sent to [facility]  6/26/24  L heel closed - wbat with orthotic for L foot  L calf 52  Tylenol script sent   7/3/24  Using new shoe  L calf 45  Pt would benefit from a power chair for pressure relief  7/10/24  L calf measurements larger but including small area that is almost healed due to block  Appearance improved  7/17/24  Left calf smaller 73  Script for roxicodone 5 mg #28   7/24/24  Left calf smaller 64  drawtek  7/31/24  Left calf smaller 56  Oarrs rvwd, roxicodone 5 mg # 28  8/7/2024  Left lateral calf wound improving, minimal exudate  8/14/24  Left calf smaller 20  8/21/24  Appearance improved, stable in size 19  8/28/24  Smaller 14    Wound/Ulcer Pain Timing/Severity: waxing and waning, moderate, severe  Quality of pain: aching, throbbing, shooting, tender  Severity:  6 / 10   Modifying Factors: Pain worsens with debridement, dressing cahgnes  Associated Signs/Symptoms: drainage and pain    Ulcer Identification:  Ulcer Type: venous and pressure  Contributing Factors: edema, venous stasis, chronic pressure, and smoking    Diabetic/Pressure/Non Pressure Ulcers only:  Ulcer: Pressure ulcer, Stage 3    Wound: N/A        PAST MEDICAL HISTORY      Diagnosis Date    Bacteremia     Hypertension     Pressure injury of left heel, 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     Past Surgical History:   Procedure Laterality Date    LEG AMPUTATION BELOW KNEE Right 2/27/2024    RIGHT LOWER EXTREMITY GUILLOTIN AMPUTATION performed by Katerina Wild MD at [facility]    LEG AMPUTATION BELOW KNEE Bilateral 3/1/2024    BELOW KNEE AMPUTATION RIGHT LEG, LEFT LEG HEEL AND CALF DEBRIDMENT performed by Katerina Wild MD at [facility]    LEG  Increase in Pain  * Temperature over 101  * Increase in drainage from your wound  * Drainage with a foul odor  * Bleeding  * Increase in swelling  * Need for compression bandage changes due to slippage, breakthrough drainage.     If you need medical attention outside of the business hours of the Wound Care Centers please contact your PCP or go to the nearest emergency room.              Electronically signed by Katerina Wild MD on 8/28/2024 at 11:01 AM

## 2024-08-28 NOTE — PLAN OF CARE
Problem: Cognitive:  Goal: Knowledge of wound care  Description: Knowledge of wound care  Outcome: Progressing  Goal: Understands risk factors for wounds  Description: Understands risk factors for wounds  Outcome: Progressing     Problem: Venous:  Goal: Signs of wound healing will improve  Description: Signs of wound healing will improve  Outcome: Progressing     Problem: Compression therapy:  Goal: Will be free from complications associated with compression therapy  Description: Will be free from complications associated with compression therapy  Outcome: Progressing

## 2024-08-29 NOTE — DISCHARGE INSTRUCTIONS
Visit Discharge/Physician Orders     Discharge condition: Stable     Assessment of pain at discharge:yes      Anesthetic used: 4% Lido Soln     Discharge to: Home     Left via:Private automobile     Accompanied by: accompanied by family     ECF/HHA: [facility]  (ok for kerlix and coban when supply of Profore runs out-*must be wrapped from base of 5th toe to just below the knee*)       Dressing Orders: LEFT LEG WOUND (BLOCKED; LATERAL TO POSTERIOR): Cleanse with normal saline, apply drawtex, ABD pad and  profore wrap. Change M-W(@[facility])/F     Treatment Orders: FOLLOW NUTRITIOUS DIET. CHOOSE FOODS HIGH IN PROTEIN -CHICKEN- FISH-AND EGGS,  CHOOSE FOODS HIGH IN VITAMIN C.   MULTIVITAMIN DAILY.       STOP SMOKING     WBAT to Left Heel      [facility] followup visit :1 week ______________________________  (Please note your next appointment above and if you are unable to keep, kindly give a 24 hour notice. Thank you.)     Physician signature:__________________________      If you experience any of the following, please call the Wound Care Center during business hours:     * Increase in Pain  * Temperature over 101  * Increase in drainage from your wound  * Drainage with a foul odor  * Bleeding  * Increase in swelling  * Need for compression bandage changes due to slippage, breakthrough drainage.     If you need medical attention outside of the business hours of the Wound Care Centers please contact your PCP or go to the nearest emergency room.

## 2024-09-04 ENCOUNTER — HOSPITAL ENCOUNTER (OUTPATIENT)
Dept: WOUND CARE | Age: 69
Discharge: HOME | End: 2024-09-04
Attending: SURGERY
Payer: MEDICARE

## 2024-09-04 VITALS
HEART RATE: 73 BPM | WEIGHT: 275 LBS | BODY MASS INDEX: 38.5 KG/M2 | RESPIRATION RATE: 20 BRPM | DIASTOLIC BLOOD PRESSURE: 79 MMHG | SYSTOLIC BLOOD PRESSURE: 168 MMHG | HEIGHT: 71 IN | TEMPERATURE: 96.7 F

## 2024-09-04 DIAGNOSIS — I87.2 VENOUS STASIS ULCER OF LEFT CALF WITH FAT LAYER EXPOSED WITHOUT VARICOSE VEINS (HCC): ICD-10-CM

## 2024-09-04 DIAGNOSIS — L89.624 PRESSURE INJURY OF LEFT HEEL, STAGE 4 (HCC): Primary | ICD-10-CM

## 2024-09-04 DIAGNOSIS — L97.222 VENOUS STASIS ULCER OF LEFT CALF WITH FAT LAYER EXPOSED WITHOUT VARICOSE VEINS (HCC): ICD-10-CM

## 2024-09-04 PROCEDURE — 11042 DBRDMT SUBQ TIS 1ST 20SQCM/<: CPT | Performed by: SURGERY

## 2024-09-04 PROCEDURE — 11042 DBRDMT SUBQ TIS 1ST 20SQCM/<: CPT

## 2024-09-04 RX ORDER — SILVER SULFADIAZINE 10 MG/G
CREAM TOPICAL ONCE
OUTPATIENT
Start: 2024-09-04 | End: 2024-09-04

## 2024-09-04 RX ORDER — LIDOCAINE HYDROCHLORIDE 20 MG/ML
JELLY TOPICAL ONCE
OUTPATIENT
Start: 2024-09-04 | End: 2024-09-04

## 2024-09-04 RX ORDER — NEOMYCIN/BACITRACIN/POLYMYXINB 3.5-400-5K
OINTMENT (GRAM) TOPICAL ONCE
OUTPATIENT
Start: 2024-09-04 | End: 2024-09-04

## 2024-09-04 RX ORDER — BACITRACIN ZINC AND POLYMYXIN B SULFATE 500; 1000 [USP'U]/G; [USP'U]/G
OINTMENT TOPICAL ONCE
OUTPATIENT
Start: 2024-09-04 | End: 2024-09-04

## 2024-09-04 RX ORDER — TRIAMCINOLONE ACETONIDE 1 MG/G
OINTMENT TOPICAL ONCE
OUTPATIENT
Start: 2024-09-04 | End: 2024-09-04

## 2024-09-04 RX ORDER — LIDOCAINE 40 MG/G
CREAM TOPICAL ONCE
OUTPATIENT
Start: 2024-09-04 | End: 2024-09-04

## 2024-09-04 RX ORDER — LIDOCAINE HYDROCHLORIDE 40 MG/ML
SOLUTION TOPICAL ONCE
OUTPATIENT
Start: 2024-09-04 | End: 2024-09-04

## 2024-09-04 RX ORDER — MUPIROCIN 20 MG/G
OINTMENT TOPICAL ONCE
OUTPATIENT
Start: 2024-09-04 | End: 2024-09-04

## 2024-09-04 RX ORDER — LIDOCAINE 50 MG/G
OINTMENT TOPICAL ONCE
OUTPATIENT
Start: 2024-09-04 | End: 2024-09-04

## 2024-09-04 RX ORDER — GENTAMICIN SULFATE 1 MG/G
OINTMENT TOPICAL ONCE
OUTPATIENT
Start: 2024-09-04 | End: 2024-09-04

## 2024-09-04 RX ORDER — BACITRACIN ZINC 500 [USP'U]/G
OINTMENT TOPICAL ONCE
OUTPATIENT
Start: 2024-09-04 | End: 2024-09-04

## 2024-09-04 RX ORDER — LIDOCAINE HYDROCHLORIDE 40 MG/ML
SOLUTION TOPICAL ONCE
Status: COMPLETED | OUTPATIENT
Start: 2024-09-04 | End: 2024-09-04

## 2024-09-04 RX ORDER — BETAMETHASONE DIPROPIONATE 0.5 MG/G
CREAM TOPICAL ONCE
OUTPATIENT
Start: 2024-09-04 | End: 2024-09-04

## 2024-09-04 RX ORDER — SODIUM CHLOR/HYPOCHLOROUS ACID 0.033 %
SOLUTION, IRRIGATION IRRIGATION ONCE
OUTPATIENT
Start: 2024-09-04 | End: 2024-09-04

## 2024-09-04 RX ORDER — CLOBETASOL PROPIONATE 0.5 MG/G
OINTMENT TOPICAL ONCE
OUTPATIENT
Start: 2024-09-04 | End: 2024-09-04

## 2024-09-04 RX ADMIN — LIDOCAINE HYDROCHLORIDE 5 ML: 40 SOLUTION TOPICAL at 08:11

## 2024-09-04 ASSESSMENT — PAIN DESCRIPTION - FREQUENCY: FREQUENCY: INTERMITTENT

## 2024-09-04 ASSESSMENT — PAIN DESCRIPTION - ORIENTATION: ORIENTATION: LEFT;LOWER

## 2024-09-04 ASSESSMENT — PAIN DESCRIPTION - PAIN TYPE: TYPE: CHRONIC PAIN

## 2024-09-04 ASSESSMENT — PAIN DESCRIPTION - LOCATION: LOCATION: LEG

## 2024-09-04 ASSESSMENT — PAIN DESCRIPTION - DESCRIPTORS: DESCRIPTORS: DULL

## 2024-09-04 ASSESSMENT — PAIN SCALES - GENERAL: PAINLEVEL_OUTOF10: 1
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
## 2024-09-11 ENCOUNTER — HOSPITAL ENCOUNTER (OUTPATIENT)
Dept: WOUND CARE | Age: 69
Discharge: HOME | End: 2024-09-11
Attending: SURGERY
Payer: MEDICARE

## 2024-09-11 VITALS
DIASTOLIC BLOOD PRESSURE: 87 MMHG | HEIGHT: 71 IN | RESPIRATION RATE: 20 BRPM | BODY MASS INDEX: 38.5 KG/M2 | WEIGHT: 275 LBS | HEART RATE: 79 BPM | TEMPERATURE: 96.5 F | SYSTOLIC BLOOD PRESSURE: 141 MMHG

## 2024-09-11 DIAGNOSIS — I87.2 VENOUS STASIS ULCER OF LEFT CALF WITH FAT LAYER EXPOSED WITHOUT VARICOSE VEINS (HCC): ICD-10-CM

## 2024-09-11 DIAGNOSIS — L97.222 VENOUS STASIS ULCER OF LEFT CALF WITH FAT LAYER EXPOSED WITHOUT VARICOSE VEINS (HCC): ICD-10-CM

## 2024-09-11 DIAGNOSIS — L89.624 PRESSURE INJURY OF LEFT HEEL, STAGE 4 (HCC): Primary | ICD-10-CM

## 2024-09-11 PROCEDURE — 11042 DBRDMT SUBQ TIS 1ST 20SQCM/<: CPT | Performed by: SURGERY

## 2024-09-11 PROCEDURE — 11042 DBRDMT SUBQ TIS 1ST 20SQCM/<: CPT

## 2024-09-11 RX ORDER — BACITRACIN ZINC 500 [USP'U]/G
OINTMENT TOPICAL ONCE
OUTPATIENT
Start: 2024-09-11 | End: 2024-09-11

## 2024-09-11 RX ORDER — SODIUM CHLOR/HYPOCHLOROUS ACID 0.033 %
SOLUTION, IRRIGATION IRRIGATION ONCE
OUTPATIENT
Start: 2024-09-11 | End: 2024-09-11

## 2024-09-11 RX ORDER — GENTAMICIN SULFATE 1 MG/G
OINTMENT TOPICAL ONCE
OUTPATIENT
Start: 2024-09-11 | End: 2024-09-11

## 2024-09-11 RX ORDER — LIDOCAINE HYDROCHLORIDE 40 MG/ML
SOLUTION TOPICAL ONCE
OUTPATIENT
Start: 2024-09-11 | End: 2024-09-11

## 2024-09-11 RX ORDER — SILVER SULFADIAZINE 10 MG/G
CREAM TOPICAL ONCE
OUTPATIENT
Start: 2024-09-11 | End: 2024-09-11

## 2024-09-11 RX ORDER — LIDOCAINE 40 MG/G
CREAM TOPICAL ONCE
OUTPATIENT
Start: 2024-09-11 | End: 2024-09-11

## 2024-09-11 RX ORDER — LIDOCAINE HYDROCHLORIDE 20 MG/ML
JELLY TOPICAL ONCE
OUTPATIENT
Start: 2024-09-11 | End: 2024-09-11

## 2024-09-11 RX ORDER — LIDOCAINE 50 MG/G
OINTMENT TOPICAL ONCE
OUTPATIENT
Start: 2024-09-11 | End: 2024-09-11

## 2024-09-11 RX ORDER — TRIAMCINOLONE ACETONIDE 1 MG/G
OINTMENT TOPICAL ONCE
OUTPATIENT
Start: 2024-09-11 | End: 2024-09-11

## 2024-09-11 RX ORDER — BACITRACIN ZINC AND POLYMYXIN B SULFATE 500; 1000 [USP'U]/G; [USP'U]/G
OINTMENT TOPICAL ONCE
OUTPATIENT
Start: 2024-09-11 | End: 2024-09-11

## 2024-09-11 RX ORDER — NEOMYCIN/BACITRACIN/POLYMYXINB 3.5-400-5K
OINTMENT (GRAM) TOPICAL ONCE
OUTPATIENT
Start: 2024-09-11 | End: 2024-09-11

## 2024-09-11 RX ORDER — BETAMETHASONE DIPROPIONATE 0.5 MG/G
CREAM TOPICAL ONCE
OUTPATIENT
Start: 2024-09-11 | End: 2024-09-11

## 2024-09-11 RX ORDER — LIDOCAINE HYDROCHLORIDE 40 MG/ML
SOLUTION TOPICAL ONCE
Status: COMPLETED | OUTPATIENT
Start: 2024-09-11 | End: 2024-09-11

## 2024-09-11 RX ORDER — MUPIROCIN 20 MG/G
OINTMENT TOPICAL ONCE
OUTPATIENT
Start: 2024-09-11 | End: 2024-09-11

## 2024-09-11 RX ORDER — CLOBETASOL PROPIONATE 0.5 MG/G
OINTMENT TOPICAL ONCE
OUTPATIENT
Start: 2024-09-11 | End: 2024-09-11

## 2024-09-11 RX ADMIN — LIDOCAINE HYDROCHLORIDE 10 ML: 40 SOLUTION TOPICAL at 07:58

## 2024-09-11 ASSESSMENT — PAIN DESCRIPTION - ONSET: ONSET: ON-GOING

## 2024-09-11 ASSESSMENT — PAIN DESCRIPTION - LOCATION: LOCATION: LEG

## 2024-09-11 ASSESSMENT — PAIN DESCRIPTION - ORIENTATION: ORIENTATION: LEFT

## 2024-09-11 ASSESSMENT — PAIN - FUNCTIONAL ASSESSMENT: PAIN_FUNCTIONAL_ASSESSMENT: PREVENTS OR INTERFERES SOME ACTIVE ACTIVITIES AND ADLS

## 2024-09-11 ASSESSMENT — PAIN DESCRIPTION - FREQUENCY: FREQUENCY: INTERMITTENT

## 2024-09-11 ASSESSMENT — PAIN DESCRIPTION - DESCRIPTORS: DESCRIPTORS: DULL

## 2024-09-11 ASSESSMENT — PAIN SCALES - GENERAL: PAINLEVEL_OUTOF10: 2

## 2024-09-11 ASSESSMENT — PAIN DESCRIPTION - PAIN TYPE: TYPE: CHRONIC PAIN

## 2024-09-18 ENCOUNTER — HOSPITAL ENCOUNTER (OUTPATIENT)
Dept: WOUND CARE | Age: 69
Discharge: HOME | End: 2024-09-18
Attending: SURGERY
Payer: MEDICARE

## 2024-09-18 DIAGNOSIS — L89.624 PRESSURE INJURY OF LEFT HEEL, STAGE 4 (HCC): Primary | ICD-10-CM

## 2024-09-18 DIAGNOSIS — L97.222 VENOUS STASIS ULCER OF LEFT CALF WITH FAT LAYER EXPOSED WITHOUT VARICOSE VEINS (HCC): ICD-10-CM

## 2024-09-18 DIAGNOSIS — I87.2 VENOUS STASIS ULCER OF LEFT CALF WITH FAT LAYER EXPOSED WITHOUT VARICOSE VEINS (HCC): ICD-10-CM

## 2024-09-18 PROCEDURE — 11042 DBRDMT SUBQ TIS 1ST 20SQCM/<: CPT | Performed by: SURGERY

## 2024-09-18 PROCEDURE — 11042 DBRDMT SUBQ TIS 1ST 20SQCM/<: CPT

## 2024-09-18 RX ORDER — CLOBETASOL PROPIONATE 0.5 MG/G
OINTMENT TOPICAL ONCE
OUTPATIENT
Start: 2024-09-18 | End: 2024-09-18

## 2024-09-18 RX ORDER — TRIAMCINOLONE ACETONIDE 1 MG/G
OINTMENT TOPICAL ONCE
OUTPATIENT
Start: 2024-09-18 | End: 2024-09-18

## 2024-09-18 RX ORDER — SILVER SULFADIAZINE 10 MG/G
CREAM TOPICAL ONCE
OUTPATIENT
Start: 2024-09-18 | End: 2024-09-18

## 2024-09-18 RX ORDER — LIDOCAINE 50 MG/G
OINTMENT TOPICAL ONCE
OUTPATIENT
Start: 2024-09-18 | End: 2024-09-18

## 2024-09-18 RX ORDER — LIDOCAINE 40 MG/G
CREAM TOPICAL ONCE
OUTPATIENT
Start: 2024-09-18 | End: 2024-09-18

## 2024-09-18 RX ORDER — SODIUM CHLOR/HYPOCHLOROUS ACID 0.033 %
SOLUTION, IRRIGATION IRRIGATION ONCE
OUTPATIENT
Start: 2024-09-18 | End: 2024-09-18

## 2024-09-18 RX ORDER — LIDOCAINE HYDROCHLORIDE 40 MG/ML
SOLUTION TOPICAL ONCE
Status: DISCONTINUED | OUTPATIENT
Start: 2024-09-18 | End: 2024-09-19 | Stop reason: HOSPADM

## 2024-09-18 RX ORDER — MUPIROCIN 20 MG/G
OINTMENT TOPICAL ONCE
OUTPATIENT
Start: 2024-09-18 | End: 2024-09-18

## 2024-09-18 RX ORDER — LIDOCAINE HYDROCHLORIDE 40 MG/ML
SOLUTION TOPICAL ONCE
OUTPATIENT
Start: 2024-09-18 | End: 2024-09-18

## 2024-09-18 RX ORDER — NEOMYCIN/BACITRACIN/POLYMYXINB 3.5-400-5K
OINTMENT (GRAM) TOPICAL ONCE
OUTPATIENT
Start: 2024-09-18 | End: 2024-09-18

## 2024-09-18 RX ORDER — BETAMETHASONE DIPROPIONATE 0.5 MG/G
CREAM TOPICAL ONCE
OUTPATIENT
Start: 2024-09-18 | End: 2024-09-18

## 2024-09-18 RX ORDER — BACITRACIN ZINC 500 [USP'U]/G
OINTMENT TOPICAL ONCE
OUTPATIENT
Start: 2024-09-18 | End: 2024-09-18

## 2024-09-18 RX ORDER — GENTAMICIN SULFATE 1 MG/G
OINTMENT TOPICAL ONCE
OUTPATIENT
Start: 2024-09-18 | End: 2024-09-18

## 2024-09-18 RX ORDER — LIDOCAINE HYDROCHLORIDE 20 MG/ML
JELLY TOPICAL ONCE
OUTPATIENT
Start: 2024-09-18 | End: 2024-09-18

## 2024-09-18 RX ORDER — BACITRACIN ZINC AND POLYMYXIN B SULFATE 500; 1000 [USP'U]/G; [USP'U]/G
OINTMENT TOPICAL ONCE
OUTPATIENT
Start: 2024-09-18 | End: 2024-09-18

## 2024-09-25 ENCOUNTER — HOSPITAL ENCOUNTER (OUTPATIENT)
Dept: WOUND CARE | Age: 69
Discharge: HOME | End: 2024-09-25
Attending: SURGERY
Payer: MEDICARE

## 2024-09-25 VITALS
WEIGHT: 275 LBS | HEART RATE: 77 BPM | RESPIRATION RATE: 20 BRPM | DIASTOLIC BLOOD PRESSURE: 85 MMHG | TEMPERATURE: 97.2 F | BODY MASS INDEX: 38.5 KG/M2 | SYSTOLIC BLOOD PRESSURE: 159 MMHG | HEIGHT: 71 IN

## 2024-09-25 DIAGNOSIS — L89.624 PRESSURE INJURY OF LEFT HEEL, STAGE 4 (HCC): Chronic | ICD-10-CM

## 2024-09-25 DIAGNOSIS — I87.2 VENOUS STASIS ULCER OF LEFT CALF WITH FAT LAYER EXPOSED WITHOUT VARICOSE VEINS (HCC): Primary | ICD-10-CM

## 2024-09-25 DIAGNOSIS — L97.222 VENOUS STASIS ULCER OF LEFT CALF WITH FAT LAYER EXPOSED WITHOUT VARICOSE VEINS (HCC): Primary | ICD-10-CM

## 2024-09-25 PROCEDURE — 97597 DBRDMT OPN WND 1ST 20 CM/<: CPT

## 2024-09-25 PROCEDURE — 97597 DBRDMT OPN WND 1ST 20 CM/<: CPT | Performed by: SURGERY

## 2024-09-25 PROCEDURE — 11042 DBRDMT SUBQ TIS 1ST 20SQCM/<: CPT

## 2024-09-25 RX ORDER — LIDOCAINE 50 MG/G
OINTMENT TOPICAL ONCE
OUTPATIENT
Start: 2024-09-25 | End: 2024-09-25

## 2024-09-25 RX ORDER — LIDOCAINE HYDROCHLORIDE 40 MG/ML
SOLUTION TOPICAL ONCE
OUTPATIENT
Start: 2024-09-25 | End: 2024-09-25

## 2024-09-25 RX ORDER — TRIAMCINOLONE ACETONIDE 1 MG/G
OINTMENT TOPICAL ONCE
OUTPATIENT
Start: 2024-09-25 | End: 2024-09-25

## 2024-09-25 RX ORDER — BETAMETHASONE DIPROPIONATE 0.5 MG/G
CREAM TOPICAL ONCE
OUTPATIENT
Start: 2024-09-25 | End: 2024-09-25

## 2024-09-25 RX ORDER — BACITRACIN ZINC AND POLYMYXIN B SULFATE 500; 1000 [USP'U]/G; [USP'U]/G
OINTMENT TOPICAL ONCE
OUTPATIENT
Start: 2024-09-25 | End: 2024-09-25

## 2024-09-25 RX ORDER — SILVER SULFADIAZINE 10 MG/G
CREAM TOPICAL ONCE
OUTPATIENT
Start: 2024-09-25 | End: 2024-09-25

## 2024-09-25 RX ORDER — CLOBETASOL PROPIONATE 0.5 MG/G
OINTMENT TOPICAL ONCE
OUTPATIENT
Start: 2024-09-25 | End: 2024-09-25

## 2024-09-25 RX ORDER — SODIUM CHLOR/HYPOCHLOROUS ACID 0.033 %
SOLUTION, IRRIGATION IRRIGATION ONCE
OUTPATIENT
Start: 2024-09-25 | End: 2024-09-25

## 2024-09-25 RX ORDER — LIDOCAINE HYDROCHLORIDE 20 MG/ML
JELLY TOPICAL ONCE
OUTPATIENT
Start: 2024-09-25 | End: 2024-09-25

## 2024-09-25 RX ORDER — NEOMYCIN/BACITRACIN/POLYMYXINB 3.5-400-5K
OINTMENT (GRAM) TOPICAL ONCE
OUTPATIENT
Start: 2024-09-25 | End: 2024-09-25

## 2024-09-25 RX ORDER — GENTAMICIN SULFATE 1 MG/G
OINTMENT TOPICAL ONCE
OUTPATIENT
Start: 2024-09-25 | End: 2024-09-25

## 2024-09-25 RX ORDER — LIDOCAINE 40 MG/G
CREAM TOPICAL ONCE
OUTPATIENT
Start: 2024-09-25 | End: 2024-09-25

## 2024-09-25 RX ORDER — MUPIROCIN 20 MG/G
OINTMENT TOPICAL ONCE
OUTPATIENT
Start: 2024-09-25 | End: 2024-09-25

## 2024-09-25 RX ORDER — BACITRACIN ZINC 500 [USP'U]/G
OINTMENT TOPICAL ONCE
OUTPATIENT
Start: 2024-09-25 | End: 2024-09-25

## 2024-09-25 RX ORDER — LIDOCAINE HYDROCHLORIDE 40 MG/ML
SOLUTION TOPICAL ONCE
Status: COMPLETED | OUTPATIENT
Start: 2024-09-25 | End: 2024-09-25

## 2024-09-25 RX ADMIN — LIDOCAINE HYDROCHLORIDE 10 ML: 40 SOLUTION TOPICAL at 07:58

## 2024-10-02 ENCOUNTER — HOSPITAL ENCOUNTER (OUTPATIENT)
Dept: WOUND CARE | Age: 69
Discharge: HOME | End: 2024-10-02
Attending: SURGERY
Payer: MEDICARE

## 2024-10-02 VITALS
SYSTOLIC BLOOD PRESSURE: 134 MMHG | DIASTOLIC BLOOD PRESSURE: 66 MMHG | BODY MASS INDEX: 38.5 KG/M2 | HEIGHT: 71 IN | TEMPERATURE: 97.8 F | HEART RATE: 76 BPM | RESPIRATION RATE: 18 BRPM | WEIGHT: 275 LBS

## 2024-10-02 DIAGNOSIS — L97.222 VENOUS STASIS ULCER OF LEFT CALF WITH FAT LAYER EXPOSED WITHOUT VARICOSE VEINS (HCC): Primary | ICD-10-CM

## 2024-10-02 DIAGNOSIS — L89.624 PRESSURE INJURY OF LEFT HEEL, STAGE 4 (HCC): Chronic | ICD-10-CM

## 2024-10-02 DIAGNOSIS — I87.2 VENOUS STASIS ULCER OF LEFT CALF WITH FAT LAYER EXPOSED WITHOUT VARICOSE VEINS (HCC): Primary | ICD-10-CM

## 2024-10-02 PROCEDURE — 97597 DBRDMT OPN WND 1ST 20 CM/<: CPT | Performed by: SURGERY

## 2024-10-02 PROCEDURE — 97597 DBRDMT OPN WND 1ST 20 CM/<: CPT

## 2024-10-02 RX ORDER — SILVER SULFADIAZINE 10 MG/G
CREAM TOPICAL ONCE
OUTPATIENT
Start: 2024-10-02 | End: 2024-10-02

## 2024-10-02 RX ORDER — LIDOCAINE HYDROCHLORIDE 20 MG/ML
JELLY TOPICAL ONCE
OUTPATIENT
Start: 2024-10-02 | End: 2024-10-02

## 2024-10-02 RX ORDER — NEOMYCIN/BACITRACIN/POLYMYXINB 3.5-400-5K
OINTMENT (GRAM) TOPICAL ONCE
OUTPATIENT
Start: 2024-10-02 | End: 2024-10-02

## 2024-10-02 RX ORDER — MUPIROCIN 20 MG/G
OINTMENT TOPICAL ONCE
OUTPATIENT
Start: 2024-10-02 | End: 2024-10-02

## 2024-10-02 RX ORDER — CLOBETASOL PROPIONATE 0.5 MG/G
OINTMENT TOPICAL ONCE
OUTPATIENT
Start: 2024-10-02 | End: 2024-10-02

## 2024-10-02 RX ORDER — SODIUM CHLOR/HYPOCHLOROUS ACID 0.033 %
SOLUTION, IRRIGATION IRRIGATION ONCE
OUTPATIENT
Start: 2024-10-02 | End: 2024-10-02

## 2024-10-02 RX ORDER — BETAMETHASONE DIPROPIONATE 0.5 MG/G
CREAM TOPICAL ONCE
OUTPATIENT
Start: 2024-10-02 | End: 2024-10-02

## 2024-10-02 RX ORDER — LIDOCAINE 40 MG/G
CREAM TOPICAL ONCE
OUTPATIENT
Start: 2024-10-02 | End: 2024-10-02

## 2024-10-02 RX ORDER — BACITRACIN ZINC AND POLYMYXIN B SULFATE 500; 1000 [USP'U]/G; [USP'U]/G
OINTMENT TOPICAL ONCE
OUTPATIENT
Start: 2024-10-02 | End: 2024-10-02

## 2024-10-02 RX ORDER — GENTAMICIN SULFATE 1 MG/G
OINTMENT TOPICAL ONCE
OUTPATIENT
Start: 2024-10-02 | End: 2024-10-02

## 2024-10-02 RX ORDER — LIDOCAINE HYDROCHLORIDE 40 MG/ML
SOLUTION TOPICAL ONCE
Status: COMPLETED | OUTPATIENT
Start: 2024-10-02 | End: 2024-10-02

## 2024-10-02 RX ORDER — BACITRACIN ZINC 500 [USP'U]/G
OINTMENT TOPICAL ONCE
OUTPATIENT
Start: 2024-10-02 | End: 2024-10-02

## 2024-10-02 RX ORDER — TRIAMCINOLONE ACETONIDE 1 MG/G
OINTMENT TOPICAL ONCE
OUTPATIENT
Start: 2024-10-02 | End: 2024-10-02

## 2024-10-02 RX ORDER — LIDOCAINE 50 MG/G
OINTMENT TOPICAL ONCE
OUTPATIENT
Start: 2024-10-02 | End: 2024-10-02

## 2024-10-02 RX ORDER — LIDOCAINE HYDROCHLORIDE 40 MG/ML
SOLUTION TOPICAL ONCE
OUTPATIENT
Start: 2024-10-02 | End: 2024-10-02

## 2024-10-02 RX ADMIN — LIDOCAINE HYDROCHLORIDE 15 ML: 40 SOLUTION TOPICAL at 08:05
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
## 2024-10-03 NOTE — DISCHARGE INSTRUCTIONS
Visit Discharge/Physician Orders     Discharge condition: Stable     Assessment of pain at discharge:yes      Anesthetic used: 4% Lido Soln     Discharge to: Home     Left via:Private automobile     Accompanied by: accompanied by family     ECF/HHA: [facility]  (Okay for discharge re: wound)      Dressing Orders: LEFT LEG WOUND (BLOCKED; LATERAL TO POSTERIOR): Cleanse with normal saline, apply Eliana, and dry dressing. Change daily.  Wear Velcro Compression.   Treatment Orders: FOLLOW NUTRITIOUS DIET. CHOOSE FOODS HIGH IN PROTEIN -CHICKEN- FISH-AND EGGS,  CHOOSE FOODS HIGH IN VITAMIN C.   MULTIVITAMIN DAILY.       STOP SMOKING     WBAT to Left Heel      Velcro compression- Vivien      [facility] followup visit :Dr. IZAGUIRRE as needed______________________________  (Please note your next appointment above and if you are unable to keep, kindly give a 24 hour notice. Thank you.)     Physician signature:__________________________      If you experience any of the following, please call the Wound Care Center during business hours:     * Increase in Pain  * Temperature over 101  * Increase in drainage from your wound  * Drainage with a foul odor  * Bleeding  * Increase in swelling  * Need for compression bandage changes due to slippage, breakthrough drainage.

## 2024-10-09 ENCOUNTER — HOSPITAL ENCOUNTER (OUTPATIENT)
Dept: WOUND CARE | Age: 69
Discharge: HOME | End: 2024-10-09
Attending: SURGERY
Payer: MEDICARE

## 2024-10-09 VITALS
TEMPERATURE: 97.3 F | WEIGHT: 275 LBS | RESPIRATION RATE: 18 BRPM | HEART RATE: 80 BPM | DIASTOLIC BLOOD PRESSURE: 78 MMHG | SYSTOLIC BLOOD PRESSURE: 148 MMHG | HEIGHT: 71 IN | BODY MASS INDEX: 38.5 KG/M2

## 2024-10-09 DIAGNOSIS — L89.624 PRESSURE INJURY OF LEFT HEEL, STAGE 4 (HCC): Chronic | ICD-10-CM

## 2024-10-09 DIAGNOSIS — L97.222 VENOUS STASIS ULCER OF LEFT CALF WITH FAT LAYER EXPOSED WITHOUT VARICOSE VEINS (HCC): Primary | ICD-10-CM

## 2024-10-09 DIAGNOSIS — I87.2 VENOUS STASIS ULCER OF LEFT CALF WITH FAT LAYER EXPOSED WITHOUT VARICOSE VEINS (HCC): Primary | ICD-10-CM

## 2024-10-09 PROCEDURE — 99212 OFFICE O/P EST SF 10 MIN: CPT

## 2024-10-09 PROCEDURE — 99213 OFFICE O/P EST LOW 20 MIN: CPT | Performed by: SURGERY

## 2024-10-09 RX ORDER — LIDOCAINE HYDROCHLORIDE 20 MG/ML
JELLY TOPICAL ONCE
Status: CANCELLED | OUTPATIENT
Start: 2024-10-09 | End: 2024-10-09

## 2024-10-09 RX ORDER — SODIUM CHLOR/HYPOCHLOROUS ACID 0.033 %
SOLUTION, IRRIGATION IRRIGATION ONCE
Status: CANCELLED | OUTPATIENT
Start: 2024-10-09 | End: 2024-10-09

## 2024-10-09 RX ORDER — GENTAMICIN SULFATE 1 MG/G
OINTMENT TOPICAL ONCE
Status: CANCELLED | OUTPATIENT
Start: 2024-10-09 | End: 2024-10-09

## 2024-10-09 RX ORDER — BETAMETHASONE DIPROPIONATE 0.5 MG/G
CREAM TOPICAL ONCE
Status: CANCELLED | OUTPATIENT
Start: 2024-10-09 | End: 2024-10-09

## 2024-10-09 RX ORDER — LIDOCAINE HYDROCHLORIDE 40 MG/ML
SOLUTION TOPICAL ONCE
Status: COMPLETED | OUTPATIENT
Start: 2024-10-09 | End: 2024-10-09

## 2024-10-09 RX ORDER — LIDOCAINE HYDROCHLORIDE 40 MG/ML
SOLUTION TOPICAL ONCE
Status: CANCELLED | OUTPATIENT
Start: 2024-10-09 | End: 2024-10-09

## 2024-10-09 RX ORDER — MUPIROCIN 20 MG/G
OINTMENT TOPICAL ONCE
Status: CANCELLED | OUTPATIENT
Start: 2024-10-09 | End: 2024-10-09

## 2024-10-09 RX ORDER — LIDOCAINE 50 MG/G
OINTMENT TOPICAL ONCE
Status: CANCELLED | OUTPATIENT
Start: 2024-10-09 | End: 2024-10-09

## 2024-10-09 RX ORDER — LIDOCAINE 40 MG/G
CREAM TOPICAL ONCE
Status: CANCELLED | OUTPATIENT
Start: 2024-10-09 | End: 2024-10-09

## 2024-10-09 RX ORDER — TRIAMCINOLONE ACETONIDE 1 MG/G
OINTMENT TOPICAL ONCE
Status: CANCELLED | OUTPATIENT
Start: 2024-10-09 | End: 2024-10-09

## 2024-10-09 RX ORDER — NEOMYCIN/BACITRACIN/POLYMYXINB 3.5-400-5K
OINTMENT (GRAM) TOPICAL ONCE
Status: CANCELLED | OUTPATIENT
Start: 2024-10-09 | End: 2024-10-09

## 2024-10-09 RX ORDER — SILVER SULFADIAZINE 10 MG/G
CREAM TOPICAL ONCE
Status: CANCELLED | OUTPATIENT
Start: 2024-10-09 | End: 2024-10-09

## 2024-10-09 RX ORDER — BACITRACIN ZINC 500 [USP'U]/G
OINTMENT TOPICAL ONCE
Status: CANCELLED | OUTPATIENT
Start: 2024-10-09 | End: 2024-10-09

## 2024-10-09 RX ORDER — CLOBETASOL PROPIONATE 0.5 MG/G
OINTMENT TOPICAL ONCE
Status: CANCELLED | OUTPATIENT
Start: 2024-10-09 | End: 2024-10-09

## 2024-10-09 RX ORDER — BACITRACIN ZINC AND POLYMYXIN B SULFATE 500; 1000 [USP'U]/G; [USP'U]/G
OINTMENT TOPICAL ONCE
Status: CANCELLED | OUTPATIENT
Start: 2024-10-09 | End: 2024-10-09

## 2024-10-09 RX ADMIN — LIDOCAINE HYDROCHLORIDE 15 ML: 40 SOLUTION TOPICAL at 08:02
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
## 2024-11-20 NOTE — BRIEF OP NOTE
Brief Postoperative Note      Patient: Navin Tran  YOB: 1955  MRN: [id number]    Date of Procedure: 2/27/2024    Pre-Op Diagnosis Codes:     * Necrotizing fasciitis (HCC) [M72.6]    Post-Op Diagnosis: Same       Procedure(s):  RIGHT LOWER EXTREMITY GUILLOTIN AMPUTATION  LEFT LOWER LEG DEBRIDEMENT DOWN TO BONE INCISION AND DRAINAGE    Surgeon(s):  Katerina Wild MD    Assistant:  Surgical Assistant: Chuy Novoa  Resident: Robert Goyal MD    Anesthesia: General    Estimated Blood Loss (mL): less than 100     Complications: None    Specimens:   ID Type Source Tests Collected by Time Destination   1 : Right lower leg Tissue Tissue CULTURE, ANAEROBIC, CULTURE, FUNGUS (Canceled), GRAM STAIN (Canceled), CULTURE, SURGICAL Katerina Wild MD 2/27/2024 1359    2 : Left lower leg Tissue Tissue CULTURE, ANAEROBIC, CULTURE, FUNGUS, GRAM STAIN (Canceled), CULTURE, SURGICAL Katerina Wild MD 2/27/2024 1404    3 : Left heel bone (Evaluate for osteomyelitis) Specimen Foot CULTURE, SURGICAL Katerina Wild MD 2/27/2024 1407    A : Right lower leg (Sent to [facility]) Specimen Leg SURGICAL PATHOLOGY Katerina Wild MD 2/27/2024 1402        Implants:  * No implants in log *      Drains:   NG/OG/NJ/NE Tube Center mouth (Active)   Surrounding Skin Clean, dry & intact 02/27/24 1600   Securement device Tape 02/27/24 1600   Status Clamped 02/27/24 1600   Placement Verified X-Ray (Initial) 02/27/24 1600   NG/OG/NJ/NE External Measurement (cm) 60 cm 02/27/24 1600       Urinary Catheter 02/27/24 (Active)   Catheter Indications Need for fluid volume management of the critically ill patient in a critical care setting 02/27/24 1600   Site Assessment [place] 02/27/24 1600   Urine Color Yellow 02/27/24 1600   Urine Appearance Clear 02/27/24 1600   Collection Container Standard 02/27/24 1600   Securement Method Leg strap 02/27/24 1600   Catheter Care  Soap and water 02/27/24 1030  No

## 2025-05-05 ENCOUNTER — OFFICE VISIT (OUTPATIENT)
Dept: VASCULAR SURGERY | Age: 70
End: 2025-05-05
Payer: MEDICARE

## 2025-05-05 DIAGNOSIS — Z89.511 HX OF RIGHT BKA (HCC): Primary | ICD-10-CM

## 2025-05-05 PROCEDURE — 3017F COLORECTAL CA SCREEN DOC REV: CPT | Performed by: SURGERY

## 2025-05-05 PROCEDURE — 1124F ACP DISCUSS-NO DSCNMKR DOCD: CPT | Performed by: SURGERY

## 2025-05-05 PROCEDURE — 1159F MED LIST DOCD IN RCRD: CPT | Performed by: SURGERY

## 2025-05-05 PROCEDURE — 4004F PT TOBACCO SCREEN RCVD TLK: CPT | Performed by: SURGERY

## 2025-05-05 PROCEDURE — 99214 OFFICE O/P EST MOD 30 MIN: CPT | Performed by: SURGERY

## 2025-05-05 PROCEDURE — G8419 CALC BMI OUT NRM PARAM NOF/U: HCPCS | Performed by: SURGERY

## 2025-05-05 PROCEDURE — G8427 DOCREV CUR MEDS BY ELIG CLIN: HCPCS | Performed by: SURGERY

## 2025-05-05 NOTE — PROGRESS NOTES
performed by Katerina Wild MD at [facility]    LEG SURGERY Left 3/26/2024    LEFT LOWER EXTREMITY WOUND DEBRIDEMENT WITH ADVANCED SKIN THERAPY performed by Katerina Wild MD at [facility]     Current Medications:   Current Outpatient Medications   Medication Sig Dispense Refill    Levothyroxine Sodium 50 MCG CAPS Take 50 mcg by mouth daily      acetaminophen (TYLENOL) 500 MG tablet Take 1 tablet by mouth 4 times daily as needed for Pain 120 tablet 0    ibuprofen (ADVIL;MOTRIN) 600 MG tablet Take 1 tablet by mouth every 6 hours as needed for Pain      oxyCODONE (ROXICODONE) 5 MG immediate release tablet Take 1 tablet by mouth every 4 hours as needed for Pain.       No current facility-administered medications for this visit.     Allergies:  Metoprolol and Pcn [penicillins]  Social History     Socioeconomic History    Marital status:      Spouse name: Not on file    Number of children: Not on file    Years of education: Not on file    Highest education level: Not on file   Occupational History    Not on file   Tobacco Use    Smoking status: Every Day     Current packs/day: 1.00     Types: Cigarettes, Cigars     Passive exposure: Current    Smokeless tobacco: Never    Tobacco comments:     1-1.5 a day    Vaping Use    Vaping status: Never Used   Substance and Sexual Activity    Alcohol use: Never    Drug use: Never    Sexual activity: Not on file   Other Topics Concern    Not on file   Social History Narrative    Not on file     Social Drivers of Health     Financial Resource Strain: Low Risk  (9/20/2023)    Overall Financial Resource Strain (CARDIA)     Difficulty of Paying Living Expenses: Not hard at all   Food Insecurity: Patient Declined (2/27/2024)    Hunger Vital Sign     Worried About Running Out of Food in the Last Year: Patient declined     Ran Out of Food in the Last Year: Patient declined   Transportation Needs: No Transportation Needs (4/8/2024)    Received from [employer], [facility]

## 2025-06-17 ENCOUNTER — HOSPITAL ENCOUNTER (OUTPATIENT)
Dept: WOUND CARE | Age: 70
Discharge: HOME | End: 2025-06-17
Attending: STUDENT IN AN ORGANIZED HEALTH CARE EDUCATION/TRAINING PROGRAM
Payer: MEDICARE

## 2025-06-17 ENCOUNTER — HOSPITAL ENCOUNTER (OUTPATIENT)
Dept: GENERAL RADIOLOGY | Age: 70
Discharge: HOME | End: 2025-06-19
Payer: MEDICARE

## 2025-06-17 ENCOUNTER — HOSPITAL ENCOUNTER (OUTPATIENT)
Age: 70
Discharge: HOME | End: 2025-06-19
Payer: MEDICARE

## 2025-06-17 VITALS — DIASTOLIC BLOOD PRESSURE: 100 MMHG | SYSTOLIC BLOOD PRESSURE: 176 MMHG | HEART RATE: 64 BPM | RESPIRATION RATE: 18 BRPM

## 2025-06-17 DIAGNOSIS — L97.222 VENOUS STASIS ULCER OF LEFT CALF WITH FAT LAYER EXPOSED WITHOUT VARICOSE VEINS (HCC): Primary | ICD-10-CM

## 2025-06-17 DIAGNOSIS — I87.2 VENOUS STASIS ULCER OF LEFT CALF WITH FAT LAYER EXPOSED WITHOUT VARICOSE VEINS (HCC): Primary | ICD-10-CM

## 2025-06-17 DIAGNOSIS — L89.624 PRESSURE INJURY OF LEFT HEEL, STAGE 4 (HCC): Chronic | ICD-10-CM

## 2025-06-17 PROCEDURE — 11042 DBRDMT SUBQ TIS 1ST 20SQCM/<: CPT | Performed by: STUDENT IN AN ORGANIZED HEALTH CARE EDUCATION/TRAINING PROGRAM

## 2025-06-17 PROCEDURE — 11045 DBRDMT SUBQ TISS EACH ADDL: CPT

## 2025-06-17 PROCEDURE — 99213 OFFICE O/P EST LOW 20 MIN: CPT

## 2025-06-17 PROCEDURE — 11042 DBRDMT SUBQ TIS 1ST 20SQCM/<: CPT

## 2025-06-17 PROCEDURE — 73620 X-RAY EXAM OF FOOT: CPT

## 2025-06-17 PROCEDURE — 11045 DBRDMT SUBQ TISS EACH ADDL: CPT | Performed by: STUDENT IN AN ORGANIZED HEALTH CARE EDUCATION/TRAINING PROGRAM

## 2025-06-17 PROCEDURE — 99214 OFFICE O/P EST MOD 30 MIN: CPT | Performed by: STUDENT IN AN ORGANIZED HEALTH CARE EDUCATION/TRAINING PROGRAM

## 2025-06-17 ASSESSMENT — PAIN DESCRIPTION - LOCATION: LOCATION: FOOT

## 2025-06-17 ASSESSMENT — PAIN DESCRIPTION - DESCRIPTORS: DESCRIPTORS: DISCOMFORT

## 2025-06-17 ASSESSMENT — PAIN SCALES - GENERAL: PAINLEVEL_OUTOF10: 3

## 2025-06-17 ASSESSMENT — PAIN DESCRIPTION - ORIENTATION: ORIENTATION: LEFT

## 2025-06-17 NOTE — DISCHARGE INSTRUCTIONS
Visit Discharge/Physician Orders    Discharge condition: Stable    Assessment of pain at discharge:    Anesthetic used: lido 4%    Discharge to: Home    Left via:Private automobile    Accompanied by: family    ECF/HHA:     Dressing Orders: To left lateral leg wound- Cleanse with saline, apply calcium alginate AG, ABD pad and wrap with coban 2 wrap. Change weekly at the [facility].     Keep wrap dry at all times. If wrap becomes wet or falls 2 inches call [facility] ([phone])and may remove wrap and apply double tubigrip.     Treatment Orders: Eat a diet high in protein and vitamin C. Take a multiple vitamin daily unless contraindicated.  Stop smoking, smoking delays wound healing    XRAY of the left leg ordered, to be completed ASAP     [facility] followup visit _______1 week ___________________  (Please note your next appointment above and if you are unable to keep, kindly give a 24 hour notice. Thank you.)    Physician signature:__________________________      If you experience any of the following, please call the Wound Care Center during business hours:    * Increase in Pain  * Temperature over 101  * Increase in drainage from your wound  * Drainage with a foul odor  * Bleeding  * Increase in swelling  * Need for compression bandage changes due to slippage, breakthrough drainage.    If you need medical attention outside of the business hours of the Wound Care Centers please contact your PCP or go to the nearest emergency room.
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
## 2025-06-18 NOTE — DISCHARGE INSTRUCTIONS
Visit Discharge/Physician Orders     Discharge condition: Stable     Assessment of pain at discharge:     Anesthetic used: lido 4%     Discharge to: Home     Left via:Private automobile     Accompanied by: family     ECF/HHA:      Dressing Orders: To left lateral leg wound- Cleanse with saline, apply calcium alginate AG, ABD pad and wrap with coban 2 wrap. Change weekly at the [facility].      Keep wrap dry at all times. If wrap becomes wet or falls 2 inches call [facility] ([phone])and may remove wrap and apply double tubigrip.      Treatment Orders: Eat a diet high in protein and vitamin C. Take a multiple vitamin daily unless contraindicated.  Stop smoking, smoking delays wound healing     [facility] followup visit _______1 week ___________________  (Please note your next appointment above and if you are unable to keep, kindly give a 24 hour notice. Thank you.)     Physician signature:__________________________        If you experience any of the following, please call the Wound Care Center during business hours:     * Increase in Pain  * Temperature over 101  * Increase in drainage from your wound  * Drainage with a foul odor  * Bleeding  * Increase in swelling  * Need for compression bandage changes due to slippage, breakthrough drainage.     If you need medical attention outside of the business hours of the Wound Care Centers please contact your PCP or go to the nearest emergency room.

## 2025-06-24 ENCOUNTER — HOSPITAL ENCOUNTER (OUTPATIENT)
Dept: WOUND CARE | Age: 70
Discharge: HOME | End: 2025-06-24
Attending: STUDENT IN AN ORGANIZED HEALTH CARE EDUCATION/TRAINING PROGRAM
Payer: MEDICARE

## 2025-06-24 VITALS
SYSTOLIC BLOOD PRESSURE: 157 MMHG | TEMPERATURE: 98.5 F | HEART RATE: 76 BPM | DIASTOLIC BLOOD PRESSURE: 87 MMHG | RESPIRATION RATE: 18 BRPM

## 2025-06-24 DIAGNOSIS — L97.222 VENOUS STASIS ULCER OF LEFT CALF WITH FAT LAYER EXPOSED WITHOUT VARICOSE VEINS (HCC): ICD-10-CM

## 2025-06-24 DIAGNOSIS — I87.2 VENOUS STASIS ULCER OF LEFT CALF WITH FAT LAYER EXPOSED WITHOUT VARICOSE VEINS (HCC): ICD-10-CM

## 2025-06-24 DIAGNOSIS — L89.624 PRESSURE INJURY OF LEFT HEEL, STAGE 4 (HCC): Primary | Chronic | ICD-10-CM

## 2025-06-24 PROCEDURE — 11045 DBRDMT SUBQ TISS EACH ADDL: CPT | Performed by: STUDENT IN AN ORGANIZED HEALTH CARE EDUCATION/TRAINING PROGRAM

## 2025-06-24 PROCEDURE — 11042 DBRDMT SUBQ TIS 1ST 20SQCM/<: CPT

## 2025-06-24 PROCEDURE — 11045 DBRDMT SUBQ TISS EACH ADDL: CPT

## 2025-06-24 PROCEDURE — 11042 DBRDMT SUBQ TIS 1ST 20SQCM/<: CPT | Performed by: STUDENT IN AN ORGANIZED HEALTH CARE EDUCATION/TRAINING PROGRAM

## 2025-06-24 RX ORDER — SODIUM CHLOR/HYPOCHLOROUS ACID 0.033 %
SOLUTION, IRRIGATION IRRIGATION PRN
OUTPATIENT
Start: 2025-06-24

## 2025-06-24 RX ORDER — GENTAMICIN SULFATE 1 MG/G
OINTMENT TOPICAL PRN
OUTPATIENT
Start: 2025-06-24

## 2025-06-24 RX ORDER — BACITRACIN ZINC 500 [USP'U]/G
OINTMENT TOPICAL PRN
OUTPATIENT
Start: 2025-06-24

## 2025-06-24 RX ORDER — LIDOCAINE HYDROCHLORIDE 40 MG/ML
SOLUTION TOPICAL PRN
OUTPATIENT
Start: 2025-06-24

## 2025-06-24 RX ORDER — CLOBETASOL PROPIONATE 0.5 MG/G
OINTMENT TOPICAL PRN
OUTPATIENT
Start: 2025-06-24

## 2025-06-24 RX ORDER — LIDOCAINE HYDROCHLORIDE 20 MG/ML
JELLY TOPICAL PRN
OUTPATIENT
Start: 2025-06-24

## 2025-06-24 RX ORDER — BACITRACIN ZINC AND POLYMYXIN B SULFATE 500; 1000 [USP'U]/G; [USP'U]/G
OINTMENT TOPICAL PRN
OUTPATIENT
Start: 2025-06-24

## 2025-06-24 RX ORDER — BETAMETHASONE DIPROPIONATE 0.5 MG/G
CREAM TOPICAL PRN
OUTPATIENT
Start: 2025-06-24

## 2025-06-24 RX ORDER — LIDOCAINE 40 MG/G
CREAM TOPICAL PRN
OUTPATIENT
Start: 2025-06-24

## 2025-06-24 RX ORDER — LIDOCAINE 50 MG/G
OINTMENT TOPICAL PRN
OUTPATIENT
Start: 2025-06-24

## 2025-06-24 RX ORDER — NEOMYCIN/BACITRACIN/POLYMYXINB 3.5-400-5K
OINTMENT (GRAM) TOPICAL PRN
OUTPATIENT
Start: 2025-06-24

## 2025-06-24 RX ORDER — MUPIROCIN 2 %
OINTMENT (GRAM) TOPICAL PRN
OUTPATIENT
Start: 2025-06-24

## 2025-06-24 RX ORDER — TRIAMCINOLONE ACETONIDE 1 MG/G
OINTMENT TOPICAL PRN
OUTPATIENT
Start: 2025-06-24

## 2025-06-24 RX ORDER — SILVER SULFADIAZINE 10 MG/G
CREAM TOPICAL PRN
OUTPATIENT
Start: 2025-06-24
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
## 2025-06-24 NOTE — PLAN OF CARE
Problem: Cognitive:  Goal: Knowledge of wound care  Description: Knowledge of wound care  Outcome: Progressing  Goal: Understands risk factors for wounds  Description: Understands risk factors for wounds  Outcome: Progressing     Problem: Wound:  Goal: Will show signs of wound healing; wound closure and no evidence of infection  Description: Will show signs of wound healing; wound closure and no evidence of infection  Outcome: Progressing     Problem: Arterial:  Goal: Optimize blood flow for wound healing  Description: Optimize blood flow for wound healing  Outcome: Progressing     Problem: Smoking cessation:  Goal: Ability to formulate a plan to maintain a tobacco-free life will be supported  Description: Ability to formulate a plan to maintain a tobacco-free life will be supported  Outcome: Progressing

## 2025-06-26 NOTE — DISCHARGE INSTRUCTIONS
Visit Discharge/Physician Orders     Discharge condition: Stable     Assessment of pain at discharge:     Anesthetic used: lido 4%     Discharge to: Home     Left via:Private automobile     Accompanied by: family     ECF/HHA:      Dressing Orders: To left lateral leg wound- Cleanse with saline, apply calcium alginate AG, ABD pad and wrap with coban 2 wrap. Change weekly at the [facility].      Keep wrap dry at all times. If wrap becomes wet or falls 2 inches call [facility] ([phone])and may remove wrap and apply double tubigrip.      Treatment Orders: Eat a diet high in protein and vitamin C. Take a multiple vitamin daily unless contraindicated.  Stop smoking, smoking delays wound healing     [facility] followup visit _______1 week ___________________  (Please note your next appointment above and if you are unable to keep, kindly give a 24 hour notice. Thank you.)     Physician signature:__________________________        If you experience any of the following, please call the Wound Care Center during business hours:     * Increase in Pain  * Temperature over 101  * Increase in drainage from your wound  * Drainage with a foul odor  * Bleeding  * Increase in swelling  * Need for compression bandage changes due to slippage, breakthrough drainage.     If you need medical attention outside of the business hours of the Wound Care Centers please contact your PCP or go to the nearest emergency room.

## 2025-07-01 ENCOUNTER — HOSPITAL ENCOUNTER (OUTPATIENT)
Dept: WOUND CARE | Age: 70
Discharge: HOME | End: 2025-07-01
Attending: STUDENT IN AN ORGANIZED HEALTH CARE EDUCATION/TRAINING PROGRAM
Payer: MEDICARE

## 2025-07-01 VITALS
WEIGHT: 275 LBS | HEART RATE: 72 BPM | DIASTOLIC BLOOD PRESSURE: 68 MMHG | BODY MASS INDEX: 38.5 KG/M2 | TEMPERATURE: 97.6 F | HEIGHT: 71 IN | RESPIRATION RATE: 18 BRPM | SYSTOLIC BLOOD PRESSURE: 138 MMHG

## 2025-07-01 DIAGNOSIS — I87.2 VENOUS STASIS ULCER OF LEFT CALF WITH FAT LAYER EXPOSED WITHOUT VARICOSE VEINS (HCC): Primary | ICD-10-CM

## 2025-07-01 DIAGNOSIS — L97.222 VENOUS STASIS ULCER OF LEFT CALF WITH FAT LAYER EXPOSED WITHOUT VARICOSE VEINS (HCC): Primary | ICD-10-CM

## 2025-07-01 PROCEDURE — 11042 DBRDMT SUBQ TIS 1ST 20SQCM/<: CPT | Performed by: STUDENT IN AN ORGANIZED HEALTH CARE EDUCATION/TRAINING PROGRAM

## 2025-07-01 PROCEDURE — 11045 DBRDMT SUBQ TISS EACH ADDL: CPT | Performed by: STUDENT IN AN ORGANIZED HEALTH CARE EDUCATION/TRAINING PROGRAM

## 2025-07-01 PROCEDURE — 11042 DBRDMT SUBQ TIS 1ST 20SQCM/<: CPT

## 2025-07-01 PROCEDURE — 11045 DBRDMT SUBQ TISS EACH ADDL: CPT

## 2025-07-01 RX ORDER — LIDOCAINE HYDROCHLORIDE 40 MG/ML
SOLUTION TOPICAL PRN
Status: DISCONTINUED | OUTPATIENT
Start: 2025-07-01 | End: 2025-07-02 | Stop reason: HOSPADM

## 2025-07-01 RX ORDER — LIDOCAINE HYDROCHLORIDE 40 MG/ML
SOLUTION TOPICAL PRN
OUTPATIENT
Start: 2025-07-01

## 2025-07-01 RX ORDER — BACITRACIN ZINC 500 [USP'U]/G
OINTMENT TOPICAL PRN
OUTPATIENT
Start: 2025-07-01

## 2025-07-01 RX ORDER — MUPIROCIN 2 %
OINTMENT (GRAM) TOPICAL PRN
OUTPATIENT
Start: 2025-07-01

## 2025-07-01 RX ORDER — CLOBETASOL PROPIONATE 0.5 MG/G
OINTMENT TOPICAL PRN
OUTPATIENT
Start: 2025-07-01

## 2025-07-01 RX ORDER — BETAMETHASONE DIPROPIONATE 0.5 MG/G
CREAM TOPICAL PRN
OUTPATIENT
Start: 2025-07-01

## 2025-07-01 RX ORDER — LIDOCAINE 40 MG/G
CREAM TOPICAL PRN
OUTPATIENT
Start: 2025-07-01

## 2025-07-01 RX ORDER — LIDOCAINE HYDROCHLORIDE 20 MG/ML
JELLY TOPICAL PRN
OUTPATIENT
Start: 2025-07-01

## 2025-07-01 RX ORDER — SILVER SULFADIAZINE 10 MG/G
CREAM TOPICAL PRN
OUTPATIENT
Start: 2025-07-01

## 2025-07-01 RX ORDER — NEOMYCIN/BACITRACIN/POLYMYXINB 3.5-400-5K
OINTMENT (GRAM) TOPICAL PRN
OUTPATIENT
Start: 2025-07-01

## 2025-07-01 RX ORDER — BACITRACIN ZINC AND POLYMYXIN B SULFATE 500; 1000 [USP'U]/G; [USP'U]/G
OINTMENT TOPICAL PRN
OUTPATIENT
Start: 2025-07-01

## 2025-07-01 RX ORDER — GENTAMICIN SULFATE 1 MG/G
OINTMENT TOPICAL PRN
OUTPATIENT
Start: 2025-07-01

## 2025-07-01 RX ORDER — SODIUM CHLOR/HYPOCHLOROUS ACID 0.033 %
SOLUTION, IRRIGATION IRRIGATION PRN
OUTPATIENT
Start: 2025-07-01

## 2025-07-01 RX ORDER — LIDOCAINE 50 MG/G
OINTMENT TOPICAL PRN
OUTPATIENT
Start: 2025-07-01

## 2025-07-01 RX ORDER — TRIAMCINOLONE ACETONIDE 1 MG/G
OINTMENT TOPICAL PRN
OUTPATIENT
Start: 2025-07-01

## 2025-07-01 RX ADMIN — LIDOCAINE HYDROCHLORIDE 10 ML: 40 SOLUTION TOPICAL at 10:16

## 2025-07-01 ASSESSMENT — PAIN SCALES - GENERAL: PAINLEVEL_OUTOF10: 4

## 2025-07-01 ASSESSMENT — PAIN DESCRIPTION - PAIN TYPE: TYPE: CHRONIC PAIN

## 2025-07-01 ASSESSMENT — PAIN DESCRIPTION - LOCATION: LOCATION: LEG

## 2025-07-01 ASSESSMENT — PAIN DESCRIPTION - ONSET: ONSET: GRADUAL

## 2025-07-01 ASSESSMENT — PAIN DESCRIPTION - FREQUENCY: FREQUENCY: INTERMITTENT

## 2025-07-01 ASSESSMENT — PAIN DESCRIPTION - DESCRIPTORS: DESCRIPTORS: ACHING;DISCOMFORT

## 2025-07-01 ASSESSMENT — PAIN DESCRIPTION - ORIENTATION: ORIENTATION: LEFT

## 2025-07-01 ASSESSMENT — PAIN - FUNCTIONAL ASSESSMENT: PAIN_FUNCTIONAL_ASSESSMENT: PREVENTS OR INTERFERES SOME ACTIVE ACTIVITIES AND ADLS
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
## 2025-07-01 NOTE — PLAN OF CARE
Problem: Pain  Goal: Verbalizes/displays adequate comfort level or baseline comfort level  Outcome: Progressing     Problem: Cognitive:  Goal: Knowledge of wound care  Description: Knowledge of wound care  Outcome: Progressing  Goal: Understands risk factors for wounds  Description: Understands risk factors for wounds  Outcome: Progressing     Problem: Wound:  Goal: Will show signs of wound healing; wound closure and no evidence of infection  Description: Will show signs of wound healing; wound closure and no evidence of infection  Outcome: Progressing     Problem: Arterial:  Goal: Optimize blood flow for wound healing  Description: Optimize blood flow for wound healing  Outcome: Progressing     Problem: Smoking cessation:  Goal: Ability to formulate a plan to maintain a tobacco-free life will be supported  Description: Ability to formulate a plan to maintain a tobacco-free life will be supported  Outcome: Progressing

## 2025-07-02 NOTE — DISCHARGE INSTRUCTIONS
Visit Discharge/Physician Orders     Discharge condition: Stable     Assessment of pain at discharge:     Anesthetic used: lido 4%     Discharge to: Home     Left via:Private automobile     Accompanied by: family     ECF/HHA:      Dressing Orders: To left lateral leg wound- Cleanse with saline, apply calcium alginate AG, ABD pad and wrap with unna boot and coban. Change weekly at the [facility].      Keep wrap dry at all times. If wrap becomes wet or falls 2 inches call [facility] ([phone])and may remove wrap and apply double tubigrip.      Treatment Orders: Eat a diet high in protein and vitamin C. Take a multiple vitamin daily unless contraindicated.  Stop smoking, smoking delays wound healing     [facility] followup visit _______1 week ___________________  (Please note your next appointment above and if you are unable to keep, kindly give a 24 hour notice. Thank you.)     Physician signature:__________________________        If you experience any of the following, please call the Wound Care Center during business hours:     * Increase in Pain  * Temperature over 101  * Increase in drainage from your wound  * Drainage with a foul odor  * Bleeding  * Increase in swelling  * Need for compression bandage changes due to slippage, breakthrough drainage.     If you need medical attention outside of the business hours of the Wound Care Centers please contact your PCP or go to the nearest emergency room.

## 2025-07-08 ENCOUNTER — HOSPITAL ENCOUNTER (OUTPATIENT)
Dept: WOUND CARE | Age: 70
Discharge: HOME | End: 2025-07-08
Attending: STUDENT IN AN ORGANIZED HEALTH CARE EDUCATION/TRAINING PROGRAM
Payer: MEDICARE

## 2025-07-08 VITALS
WEIGHT: 275 LBS | HEIGHT: 71 IN | RESPIRATION RATE: 17 BRPM | SYSTOLIC BLOOD PRESSURE: 180 MMHG | DIASTOLIC BLOOD PRESSURE: 100 MMHG | TEMPERATURE: 97.8 F | BODY MASS INDEX: 38.5 KG/M2 | HEART RATE: 70 BPM

## 2025-07-08 DIAGNOSIS — L97.222 VENOUS STASIS ULCER OF LEFT CALF WITH FAT LAYER EXPOSED WITHOUT VARICOSE VEINS (HCC): Primary | ICD-10-CM

## 2025-07-08 DIAGNOSIS — I87.2 VENOUS STASIS ULCER OF LEFT CALF WITH FAT LAYER EXPOSED WITHOUT VARICOSE VEINS (HCC): Primary | ICD-10-CM

## 2025-07-08 PROCEDURE — 11045 DBRDMT SUBQ TISS EACH ADDL: CPT

## 2025-07-08 PROCEDURE — 11042 DBRDMT SUBQ TIS 1ST 20SQCM/<: CPT | Performed by: SURGERY

## 2025-07-08 PROCEDURE — 11045 DBRDMT SUBQ TISS EACH ADDL: CPT | Performed by: SURGERY

## 2025-07-08 PROCEDURE — 11042 DBRDMT SUBQ TIS 1ST 20SQCM/<: CPT

## 2025-07-08 RX ORDER — BACITRACIN ZINC AND POLYMYXIN B SULFATE 500; 1000 [USP'U]/G; [USP'U]/G
OINTMENT TOPICAL PRN
OUTPATIENT
Start: 2025-07-08

## 2025-07-08 RX ORDER — BACITRACIN ZINC 500 [USP'U]/G
OINTMENT TOPICAL PRN
OUTPATIENT
Start: 2025-07-08

## 2025-07-08 RX ORDER — MUPIROCIN 2 %
OINTMENT (GRAM) TOPICAL PRN
OUTPATIENT
Start: 2025-07-08

## 2025-07-08 RX ORDER — NEOMYCIN/BACITRACIN/POLYMYXINB 3.5-400-5K
OINTMENT (GRAM) TOPICAL PRN
OUTPATIENT
Start: 2025-07-08

## 2025-07-08 RX ORDER — LIDOCAINE 50 MG/G
OINTMENT TOPICAL PRN
OUTPATIENT
Start: 2025-07-08

## 2025-07-08 RX ORDER — BETAMETHASONE DIPROPIONATE 0.5 MG/G
CREAM TOPICAL PRN
OUTPATIENT
Start: 2025-07-08

## 2025-07-08 RX ORDER — GENTAMICIN SULFATE 1 MG/G
OINTMENT TOPICAL PRN
OUTPATIENT
Start: 2025-07-08

## 2025-07-08 RX ORDER — TRIAMCINOLONE ACETONIDE 1 MG/G
OINTMENT TOPICAL PRN
OUTPATIENT
Start: 2025-07-08

## 2025-07-08 RX ORDER — SODIUM CHLOR/HYPOCHLOROUS ACID 0.033 %
SOLUTION, IRRIGATION IRRIGATION PRN
OUTPATIENT
Start: 2025-07-08

## 2025-07-08 RX ORDER — LIDOCAINE HYDROCHLORIDE 40 MG/ML
SOLUTION TOPICAL PRN
Status: DISCONTINUED | OUTPATIENT
Start: 2025-07-08 | End: 2025-07-09 | Stop reason: HOSPADM

## 2025-07-08 RX ORDER — LIDOCAINE HYDROCHLORIDE 20 MG/ML
JELLY TOPICAL PRN
OUTPATIENT
Start: 2025-07-08

## 2025-07-08 RX ORDER — LIDOCAINE 40 MG/G
CREAM TOPICAL PRN
OUTPATIENT
Start: 2025-07-08

## 2025-07-08 RX ORDER — SILVER SULFADIAZINE 10 MG/G
CREAM TOPICAL PRN
OUTPATIENT
Start: 2025-07-08

## 2025-07-08 RX ORDER — CLOBETASOL PROPIONATE 0.5 MG/G
OINTMENT TOPICAL PRN
OUTPATIENT
Start: 2025-07-08

## 2025-07-08 RX ORDER — LIDOCAINE HYDROCHLORIDE 40 MG/ML
SOLUTION TOPICAL PRN
OUTPATIENT
Start: 2025-07-08

## 2025-07-08 RX ADMIN — LIDOCAINE HYDROCHLORIDE 8 ML: 40 SOLUTION TOPICAL at 09:40

## 2025-07-08 NOTE — PROGRESS NOTES
Patient encouraged to go to Emergency department for blood pressure evaluation after obtainment of manual BP of 180/100. Patient declined.   
Debrided:  fibrin, biofilm, slough, necrotic/eschar, and exudate to stimulate bleeding to promote healing, post debridement good bleeding base and wound edges noted    Wound/Ulcer #: 1    Percent of Wound/Ulcer Debrided: 100%    Total Surface Area Debrided:  31 sq cm     Estimated Blood Loss:  Minimal  Hemostasis Achieved:  by pressure    Procedural Pain:  6  / 10   Post Procedural Pain:  4 / 10     Response to treatment:  With complaints of pain.     Plan:   Treatment Note please see attached Discharge Instructions    Written patient dismissal instructions given to patient and signed by patient or POA.         Discharge Instructions         Visit Discharge/Physician Orders     Discharge condition: Stable     Assessment of pain at discharge:     Anesthetic used: lido 4%     Discharge to: Home     Left via:Private automobile     Accompanied by: family     ECF/HHA:      Dressing Orders: To left lateral leg wound- Cleanse with saline, apply calcium alginate AG, ABD pad and wrap with unna boot and coban. Change weekly at the [facility].      Keep wrap dry at all times. If wrap becomes wet or falls 2 inches call [facility] ([phone])and may remove wrap and apply double tubigrip.      Treatment Orders: Eat a diet high in protein and vitamin C. Take a multiple vitamin daily unless contraindicated.  Stop smoking, smoking delays wound healing     [facility] followup visit _______1 week ___________________  (Please note your next appointment above and if you are unable to keep, kindly give a 24 hour notice. Thank you.)     Physician signature:__________________________        If you experience any of the following, please call the Wound Care Center during business hours:     * Increase in Pain  * Temperature over 101  * Increase in drainage from your wound  * Drainage with a foul odor  * Bleeding  * Increase in swelling  * Need for compression bandage changes due to slippage, breakthrough drainage.     If you need medical attention outside of

## 2025-07-14 NOTE — DISCHARGE INSTRUCTIONS
Visit Discharge/Physician Orders     Discharge condition: Stable     Assessment of pain at discharge:     Anesthetic used: lido 4%     Discharge to: Home     Left via:Private automobile     Accompanied by: family     ECF/HHA:      Dressing Orders: To left lateral leg wound- Cleanse with saline, apply drawtex, ABD pad and wrap with profore. Change weekly at the [facility].      Keep wrap dry at all times. If wrap becomes wet or falls 2 inches call [facility] ([phone])and may remove wrap and apply double tubigrip.      Treatment Orders: Eat a diet high in protein and vitamin C. Take a multiple vitamin daily unless contraindicated.  Stop smoking, smoking delays wound healing     [facility] followup visit _______1 week ___________________  (Please note your next appointment above and if you are unable to keep, kindly give a 24 hour notice. Thank you.)     Physician signature:__________________________        If you experience any of the following, please call the Wound Care Center during business hours:     * Increase in Pain  * Temperature over 101  * Increase in drainage from your wound  * Drainage with a foul odor  * Bleeding  * Increase in swelling  * Need for compression bandage changes due to slippage, breakthrough drainage.     If you need medical attention outside of the business hours of the Wound Care Centers please contact your PCP or go to the nearest emergency room.

## 2025-07-15 ENCOUNTER — HOSPITAL ENCOUNTER (OUTPATIENT)
Dept: WOUND CARE | Age: 70
Discharge: HOME | End: 2025-07-15
Attending: STUDENT IN AN ORGANIZED HEALTH CARE EDUCATION/TRAINING PROGRAM
Payer: MEDICARE

## 2025-07-15 VITALS
HEIGHT: 71 IN | BODY MASS INDEX: 38.5 KG/M2 | DIASTOLIC BLOOD PRESSURE: 79 MMHG | HEART RATE: 76 BPM | SYSTOLIC BLOOD PRESSURE: 162 MMHG | RESPIRATION RATE: 20 BRPM | TEMPERATURE: 96.8 F | WEIGHT: 275 LBS

## 2025-07-15 DIAGNOSIS — I87.2 VENOUS STASIS ULCER OF LEFT CALF WITH FAT LAYER EXPOSED WITHOUT VARICOSE VEINS (HCC): Primary | ICD-10-CM

## 2025-07-15 DIAGNOSIS — L97.222 VENOUS STASIS ULCER OF LEFT CALF WITH FAT LAYER EXPOSED WITHOUT VARICOSE VEINS (HCC): Primary | ICD-10-CM

## 2025-07-15 PROCEDURE — 11042 DBRDMT SUBQ TIS 1ST 20SQCM/<: CPT

## 2025-07-15 PROCEDURE — 11045 DBRDMT SUBQ TISS EACH ADDL: CPT | Performed by: STUDENT IN AN ORGANIZED HEALTH CARE EDUCATION/TRAINING PROGRAM

## 2025-07-15 PROCEDURE — 11045 DBRDMT SUBQ TISS EACH ADDL: CPT

## 2025-07-15 PROCEDURE — 11042 DBRDMT SUBQ TIS 1ST 20SQCM/<: CPT | Performed by: STUDENT IN AN ORGANIZED HEALTH CARE EDUCATION/TRAINING PROGRAM

## 2025-07-15 RX ORDER — GENTAMICIN SULFATE 1 MG/G
OINTMENT TOPICAL PRN
OUTPATIENT
Start: 2025-07-15

## 2025-07-15 RX ORDER — BETAMETHASONE DIPROPIONATE 0.5 MG/G
CREAM TOPICAL PRN
OUTPATIENT
Start: 2025-07-15

## 2025-07-15 RX ORDER — LIDOCAINE HYDROCHLORIDE 40 MG/ML
SOLUTION TOPICAL PRN
OUTPATIENT
Start: 2025-07-15

## 2025-07-15 RX ORDER — TRIAMCINOLONE ACETONIDE 1 MG/G
OINTMENT TOPICAL PRN
OUTPATIENT
Start: 2025-07-15

## 2025-07-15 RX ORDER — LIDOCAINE HYDROCHLORIDE 20 MG/ML
JELLY TOPICAL PRN
OUTPATIENT
Start: 2025-07-15

## 2025-07-15 RX ORDER — LIDOCAINE 40 MG/G
CREAM TOPICAL PRN
OUTPATIENT
Start: 2025-07-15

## 2025-07-15 RX ORDER — BACITRACIN ZINC 500 [USP'U]/G
OINTMENT TOPICAL PRN
OUTPATIENT
Start: 2025-07-15

## 2025-07-15 RX ORDER — SILVER SULFADIAZINE 10 MG/G
CREAM TOPICAL PRN
OUTPATIENT
Start: 2025-07-15

## 2025-07-15 RX ORDER — NEOMYCIN/BACITRACIN/POLYMYXINB 3.5-400-5K
OINTMENT (GRAM) TOPICAL PRN
OUTPATIENT
Start: 2025-07-15

## 2025-07-15 RX ORDER — CLOBETASOL PROPIONATE 0.5 MG/G
OINTMENT TOPICAL PRN
OUTPATIENT
Start: 2025-07-15

## 2025-07-15 RX ORDER — BACITRACIN ZINC AND POLYMYXIN B SULFATE 500; 1000 [USP'U]/G; [USP'U]/G
OINTMENT TOPICAL PRN
OUTPATIENT
Start: 2025-07-15

## 2025-07-15 RX ORDER — MUPIROCIN 2 %
OINTMENT (GRAM) TOPICAL PRN
OUTPATIENT
Start: 2025-07-15

## 2025-07-15 RX ORDER — SODIUM CHLOR/HYPOCHLOROUS ACID 0.033 %
SOLUTION, IRRIGATION IRRIGATION PRN
OUTPATIENT
Start: 2025-07-15

## 2025-07-15 RX ORDER — LIDOCAINE 50 MG/G
OINTMENT TOPICAL PRN
OUTPATIENT
Start: 2025-07-15

## 2025-07-15 RX ORDER — LIDOCAINE HYDROCHLORIDE 40 MG/ML
SOLUTION TOPICAL PRN
Status: DISCONTINUED | OUTPATIENT
Start: 2025-07-15 | End: 2025-07-16 | Stop reason: HOSPADM

## 2025-07-15 RX ADMIN — LIDOCAINE HYDROCHLORIDE 10 ML: 40 SOLUTION TOPICAL at 09:42
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
## 2025-07-21 NOTE — DISCHARGE INSTRUCTIONS
Visit Discharge/Physician Orders     Discharge condition: Stable     Assessment of pain at discharge:     Anesthetic used: lido 4%     Discharge to: Home     Left via:Private automobile     Accompanied by: family     ECF/HHA:      Dressing Orders: To left lateral leg wound- Cleanse with saline, apply drawtex, ABD pad and wrap with profore. Change weekly at the [facility].      Keep wrap dry at all times. If wrap becomes wet or falls 2 inches call [facility] ([phone])and may remove wrap and apply double tubigrip.      Treatment Orders: Eat a diet high in protein and vitamin C. Take a multiple vitamin daily unless contraindicated.  Stop smoking, smoking delays wound healing     [facility] followup visit _______1 week ___________________  (Please note your next appointment above and if you are unable to keep, kindly give a 24 hour notice. Thank you.)     Physician signature:__________________________        If you experience any of the following, please call the Wound Care Center during business hours:     * Increase in Pain  * Temperature over 101  * Increase in drainage from your wound  * Drainage with a foul odor  * Bleeding  * Increase in swelling  * Need for compression bandage changes due to slippage, breakthrough drainage.     If you need medical attention outside of the business hours of the Wound Care Centers please contact your PCP or go to the nearest emergency room.

## 2025-07-22 ENCOUNTER — HOSPITAL ENCOUNTER (OUTPATIENT)
Dept: WOUND CARE | Age: 70
Discharge: HOME | End: 2025-07-22
Attending: STUDENT IN AN ORGANIZED HEALTH CARE EDUCATION/TRAINING PROGRAM
Payer: MEDICARE

## 2025-07-22 VITALS
TEMPERATURE: 97.1 F | RESPIRATION RATE: 18 BRPM | HEART RATE: 68 BPM | BODY MASS INDEX: 38.5 KG/M2 | SYSTOLIC BLOOD PRESSURE: 156 MMHG | DIASTOLIC BLOOD PRESSURE: 91 MMHG | WEIGHT: 275 LBS | HEIGHT: 71 IN

## 2025-07-22 DIAGNOSIS — L97.222 VENOUS STASIS ULCER OF LEFT CALF WITH FAT LAYER EXPOSED WITHOUT VARICOSE VEINS (HCC): Primary | ICD-10-CM

## 2025-07-22 DIAGNOSIS — I87.2 VENOUS STASIS ULCER OF LEFT CALF WITH FAT LAYER EXPOSED WITHOUT VARICOSE VEINS (HCC): Primary | ICD-10-CM

## 2025-07-22 PROCEDURE — 11045 DBRDMT SUBQ TISS EACH ADDL: CPT

## 2025-07-22 PROCEDURE — 11045 DBRDMT SUBQ TISS EACH ADDL: CPT | Performed by: STUDENT IN AN ORGANIZED HEALTH CARE EDUCATION/TRAINING PROGRAM

## 2025-07-22 PROCEDURE — 11042 DBRDMT SUBQ TIS 1ST 20SQCM/<: CPT

## 2025-07-22 PROCEDURE — 11042 DBRDMT SUBQ TIS 1ST 20SQCM/<: CPT | Performed by: STUDENT IN AN ORGANIZED HEALTH CARE EDUCATION/TRAINING PROGRAM

## 2025-07-22 RX ORDER — LIDOCAINE HYDROCHLORIDE 20 MG/ML
JELLY TOPICAL PRN
OUTPATIENT
Start: 2025-07-22

## 2025-07-22 RX ORDER — LIDOCAINE 40 MG/G
CREAM TOPICAL PRN
OUTPATIENT
Start: 2025-07-22

## 2025-07-22 RX ORDER — CLOBETASOL PROPIONATE 0.5 MG/G
OINTMENT TOPICAL PRN
OUTPATIENT
Start: 2025-07-22

## 2025-07-22 RX ORDER — SILVER SULFADIAZINE 10 MG/G
CREAM TOPICAL PRN
OUTPATIENT
Start: 2025-07-22

## 2025-07-22 RX ORDER — NEOMYCIN/BACITRACIN/POLYMYXINB 3.5-400-5K
OINTMENT (GRAM) TOPICAL PRN
OUTPATIENT
Start: 2025-07-22

## 2025-07-22 RX ORDER — GENTAMICIN SULFATE 1 MG/G
OINTMENT TOPICAL PRN
OUTPATIENT
Start: 2025-07-22

## 2025-07-22 RX ORDER — LIDOCAINE HYDROCHLORIDE 40 MG/ML
SOLUTION TOPICAL PRN
Status: DISCONTINUED | OUTPATIENT
Start: 2025-07-22 | End: 2025-07-23 | Stop reason: HOSPADM

## 2025-07-22 RX ORDER — LIDOCAINE 50 MG/G
OINTMENT TOPICAL PRN
OUTPATIENT
Start: 2025-07-22

## 2025-07-22 RX ORDER — SODIUM CHLOR/HYPOCHLOROUS ACID 0.033 %
SOLUTION, IRRIGATION IRRIGATION PRN
OUTPATIENT
Start: 2025-07-22

## 2025-07-22 RX ORDER — TRIAMCINOLONE ACETONIDE 1 MG/G
OINTMENT TOPICAL PRN
OUTPATIENT
Start: 2025-07-22

## 2025-07-22 RX ORDER — LIDOCAINE HYDROCHLORIDE 40 MG/ML
SOLUTION TOPICAL PRN
OUTPATIENT
Start: 2025-07-22

## 2025-07-22 RX ORDER — BACITRACIN ZINC AND POLYMYXIN B SULFATE 500; 1000 [USP'U]/G; [USP'U]/G
OINTMENT TOPICAL PRN
OUTPATIENT
Start: 2025-07-22

## 2025-07-22 RX ORDER — MUPIROCIN 2 %
OINTMENT (GRAM) TOPICAL PRN
OUTPATIENT
Start: 2025-07-22

## 2025-07-22 RX ORDER — BACITRACIN ZINC 500 [USP'U]/G
OINTMENT TOPICAL PRN
OUTPATIENT
Start: 2025-07-22

## 2025-07-22 RX ORDER — BETAMETHASONE DIPROPIONATE 0.5 MG/G
CREAM TOPICAL PRN
OUTPATIENT
Start: 2025-07-22

## 2025-07-22 RX ADMIN — LIDOCAINE HYDROCHLORIDE 5 ML: 40 SOLUTION TOPICAL at 09:41

## 2025-07-22 ASSESSMENT — PAIN DESCRIPTION - ORIENTATION: ORIENTATION: LEFT

## 2025-07-22 ASSESSMENT — PAIN DESCRIPTION - FREQUENCY: FREQUENCY: INTERMITTENT

## 2025-07-22 ASSESSMENT — PAIN DESCRIPTION - ONSET: ONSET: GRADUAL

## 2025-07-22 ASSESSMENT — PAIN SCALES - GENERAL: PAINLEVEL_OUTOF10: 1

## 2025-07-22 ASSESSMENT — PAIN DESCRIPTION - LOCATION: LOCATION: LEG

## 2025-07-22 ASSESSMENT — PAIN DESCRIPTION - PAIN TYPE: TYPE: CHRONIC PAIN

## 2025-07-22 ASSESSMENT — PAIN - FUNCTIONAL ASSESSMENT: PAIN_FUNCTIONAL_ASSESSMENT: PREVENTS OR INTERFERES SOME ACTIVE ACTIVITIES AND ADLS

## 2025-07-22 ASSESSMENT — PAIN DESCRIPTION - DESCRIPTORS: DESCRIPTORS: ACHING
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
## 2025-07-28 NOTE — DISCHARGE INSTRUCTIONS
Visit Discharge/Physician Orders     Discharge condition: Stable     Assessment of pain at discharge:     Anesthetic used: lido 4%     Discharge to: Home     Left via:Private automobile     Accompanied by: family     ECF/HHA:      Dressing Orders: To left lateral leg wound- Cleanse with saline, apply drawtex, ABD pad and wrap with profore. Change weekly at the [facility].      Keep wrap dry at all times. If wrap becomes wet or falls 2 inches call [facility] ([phone])and may remove wrap and apply double tubigrip.      Treatment Orders: Eat a diet high in protein and vitamin C. Take a multiple vitamin daily unless contraindicated.  Stop smoking, smoking delays wound healing     [facility] followup visit _______1 week ___________________  (Please note your next appointment above and if you are unable to keep, kindly give a 24 hour notice. Thank you.)     Physician signature:__________________________        If you experience any of the following, please call the Wound Care Center during business hours:     * Increase in Pain  * Temperature over 101  * Increase in drainage from your wound  * Drainage with a foul odor  * Bleeding  * Increase in swelling  * Need for compression bandage changes due to slippage, breakthrough drainage.     If you need medical attention outside of the business hours of the Wound Care Centers please contact your PCP or go to the nearest emergency room.

## 2025-07-29 ENCOUNTER — HOSPITAL ENCOUNTER (OUTPATIENT)
Dept: WOUND CARE | Age: 70
Discharge: HOME | End: 2025-07-29
Attending: STUDENT IN AN ORGANIZED HEALTH CARE EDUCATION/TRAINING PROGRAM
Payer: MEDICARE

## 2025-07-29 VITALS
HEART RATE: 66 BPM | BODY MASS INDEX: 38.5 KG/M2 | HEIGHT: 71 IN | WEIGHT: 275 LBS | SYSTOLIC BLOOD PRESSURE: 146 MMHG | DIASTOLIC BLOOD PRESSURE: 90 MMHG | RESPIRATION RATE: 20 BRPM | TEMPERATURE: 97.8 F

## 2025-07-29 DIAGNOSIS — I87.2 VENOUS STASIS ULCER OF LEFT CALF WITH FAT LAYER EXPOSED WITHOUT VARICOSE VEINS (HCC): Primary | ICD-10-CM

## 2025-07-29 DIAGNOSIS — L97.222 VENOUS STASIS ULCER OF LEFT CALF WITH FAT LAYER EXPOSED WITHOUT VARICOSE VEINS (HCC): Primary | ICD-10-CM

## 2025-07-29 PROCEDURE — 11045 DBRDMT SUBQ TISS EACH ADDL: CPT

## 2025-07-29 PROCEDURE — 11042 DBRDMT SUBQ TIS 1ST 20SQCM/<: CPT

## 2025-07-29 PROCEDURE — 11042 DBRDMT SUBQ TIS 1ST 20SQCM/<: CPT | Performed by: STUDENT IN AN ORGANIZED HEALTH CARE EDUCATION/TRAINING PROGRAM

## 2025-07-29 PROCEDURE — 11045 DBRDMT SUBQ TISS EACH ADDL: CPT | Performed by: STUDENT IN AN ORGANIZED HEALTH CARE EDUCATION/TRAINING PROGRAM

## 2025-07-29 RX ORDER — BACITRACIN ZINC AND POLYMYXIN B SULFATE 500; 1000 [USP'U]/G; [USP'U]/G
OINTMENT TOPICAL PRN
OUTPATIENT
Start: 2025-07-29

## 2025-07-29 RX ORDER — BACITRACIN ZINC 500 [USP'U]/G
OINTMENT TOPICAL PRN
OUTPATIENT
Start: 2025-07-29

## 2025-07-29 RX ORDER — MUPIROCIN 2 %
OINTMENT (GRAM) TOPICAL PRN
OUTPATIENT
Start: 2025-07-29

## 2025-07-29 RX ORDER — LIDOCAINE HYDROCHLORIDE 40 MG/ML
SOLUTION TOPICAL PRN
OUTPATIENT
Start: 2025-07-29

## 2025-07-29 RX ORDER — GENTAMICIN SULFATE 1 MG/G
OINTMENT TOPICAL PRN
OUTPATIENT
Start: 2025-07-29

## 2025-07-29 RX ORDER — SILVER SULFADIAZINE 10 MG/G
CREAM TOPICAL PRN
OUTPATIENT
Start: 2025-07-29

## 2025-07-29 RX ORDER — CLOBETASOL PROPIONATE 0.5 MG/G
OINTMENT TOPICAL PRN
OUTPATIENT
Start: 2025-07-29

## 2025-07-29 RX ORDER — TRIAMCINOLONE ACETONIDE 1 MG/G
OINTMENT TOPICAL PRN
OUTPATIENT
Start: 2025-07-29

## 2025-07-29 RX ORDER — NEOMYCIN/BACITRACIN/POLYMYXINB 3.5-400-5K
OINTMENT (GRAM) TOPICAL PRN
OUTPATIENT
Start: 2025-07-29

## 2025-07-29 RX ORDER — LIDOCAINE 50 MG/G
OINTMENT TOPICAL PRN
OUTPATIENT
Start: 2025-07-29

## 2025-07-29 RX ORDER — BETAMETHASONE DIPROPIONATE 0.5 MG/G
CREAM TOPICAL PRN
OUTPATIENT
Start: 2025-07-29

## 2025-07-29 RX ORDER — LIDOCAINE 40 MG/G
CREAM TOPICAL PRN
OUTPATIENT
Start: 2025-07-29

## 2025-07-29 RX ORDER — LIDOCAINE HYDROCHLORIDE 40 MG/ML
SOLUTION TOPICAL PRN
Status: DISCONTINUED | OUTPATIENT
Start: 2025-07-29 | End: 2025-07-30 | Stop reason: HOSPADM

## 2025-07-29 RX ORDER — SODIUM CHLOR/HYPOCHLOROUS ACID 0.033 %
SOLUTION, IRRIGATION IRRIGATION PRN
OUTPATIENT
Start: 2025-07-29

## 2025-07-29 RX ORDER — LIDOCAINE HYDROCHLORIDE 20 MG/ML
JELLY TOPICAL PRN
OUTPATIENT
Start: 2025-07-29

## 2025-07-29 RX ADMIN — LIDOCAINE HYDROCHLORIDE 10 ML: 40 SOLUTION TOPICAL at 09:51

## 2025-07-29 ASSESSMENT — PAIN DESCRIPTION - LOCATION: LOCATION: LEG

## 2025-07-29 ASSESSMENT — PAIN DESCRIPTION - DESCRIPTORS: DESCRIPTORS: ACHING

## 2025-07-29 ASSESSMENT — PAIN DESCRIPTION - FREQUENCY: FREQUENCY: INTERMITTENT

## 2025-07-29 ASSESSMENT — PAIN SCALES - GENERAL: PAINLEVEL_OUTOF10: 3

## 2025-07-29 ASSESSMENT — PAIN DESCRIPTION - ONSET: ONSET: ON-GOING

## 2025-07-29 ASSESSMENT — PAIN - FUNCTIONAL ASSESSMENT: PAIN_FUNCTIONAL_ASSESSMENT: PREVENTS OR INTERFERES SOME ACTIVE ACTIVITIES AND ADLS

## 2025-07-29 ASSESSMENT — PAIN DESCRIPTION - PAIN TYPE: TYPE: CHRONIC PAIN

## 2025-07-29 ASSESSMENT — PAIN DESCRIPTION - ORIENTATION: ORIENTATION: LEFT
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
## 2025-08-05 ENCOUNTER — HOSPITAL ENCOUNTER (OUTPATIENT)
Dept: WOUND CARE | Age: 70
Discharge: HOME | End: 2025-08-05
Attending: STUDENT IN AN ORGANIZED HEALTH CARE EDUCATION/TRAINING PROGRAM
Payer: MEDICARE

## 2025-08-05 VITALS
WEIGHT: 275 LBS | SYSTOLIC BLOOD PRESSURE: 186 MMHG | HEIGHT: 71 IN | HEART RATE: 69 BPM | TEMPERATURE: 96.7 F | BODY MASS INDEX: 38.5 KG/M2 | DIASTOLIC BLOOD PRESSURE: 99 MMHG | RESPIRATION RATE: 20 BRPM

## 2025-08-05 DIAGNOSIS — L97.222 VENOUS STASIS ULCER OF LEFT CALF WITH FAT LAYER EXPOSED WITHOUT VARICOSE VEINS (HCC): Primary | ICD-10-CM

## 2025-08-05 DIAGNOSIS — I87.2 VENOUS STASIS ULCER OF LEFT CALF WITH FAT LAYER EXPOSED WITHOUT VARICOSE VEINS (HCC): Primary | ICD-10-CM

## 2025-08-05 PROCEDURE — 11042 DBRDMT SUBQ TIS 1ST 20SQCM/<: CPT | Performed by: STUDENT IN AN ORGANIZED HEALTH CARE EDUCATION/TRAINING PROGRAM

## 2025-08-05 PROCEDURE — 11042 DBRDMT SUBQ TIS 1ST 20SQCM/<: CPT

## 2025-08-05 RX ORDER — TRIAMCINOLONE ACETONIDE 1 MG/G
OINTMENT TOPICAL PRN
OUTPATIENT
Start: 2025-08-05

## 2025-08-05 RX ORDER — MUPIROCIN 2 %
OINTMENT (GRAM) TOPICAL PRN
OUTPATIENT
Start: 2025-08-05

## 2025-08-05 RX ORDER — LIDOCAINE HYDROCHLORIDE 20 MG/ML
JELLY TOPICAL PRN
OUTPATIENT
Start: 2025-08-05

## 2025-08-05 RX ORDER — BACITRACIN ZINC 500 [USP'U]/G
OINTMENT TOPICAL PRN
OUTPATIENT
Start: 2025-08-05

## 2025-08-05 RX ORDER — BACITRACIN ZINC AND POLYMYXIN B SULFATE 500; 1000 [USP'U]/G; [USP'U]/G
OINTMENT TOPICAL PRN
OUTPATIENT
Start: 2025-08-05

## 2025-08-05 RX ORDER — NEOMYCIN/BACITRACIN/POLYMYXINB 3.5-400-5K
OINTMENT (GRAM) TOPICAL PRN
OUTPATIENT
Start: 2025-08-05

## 2025-08-05 RX ORDER — LIDOCAINE 50 MG/G
OINTMENT TOPICAL PRN
OUTPATIENT
Start: 2025-08-05

## 2025-08-05 RX ORDER — LIDOCAINE HYDROCHLORIDE 40 MG/ML
SOLUTION TOPICAL PRN
Status: DISCONTINUED | OUTPATIENT
Start: 2025-08-05 | End: 2025-08-06 | Stop reason: HOSPADM

## 2025-08-05 RX ORDER — SILVER SULFADIAZINE 10 MG/G
CREAM TOPICAL PRN
OUTPATIENT
Start: 2025-08-05

## 2025-08-05 RX ORDER — LIDOCAINE HYDROCHLORIDE 40 MG/ML
SOLUTION TOPICAL PRN
OUTPATIENT
Start: 2025-08-05

## 2025-08-05 RX ORDER — LIDOCAINE 40 MG/G
CREAM TOPICAL PRN
OUTPATIENT
Start: 2025-08-05

## 2025-08-05 RX ORDER — BETAMETHASONE DIPROPIONATE 0.5 MG/G
CREAM TOPICAL PRN
OUTPATIENT
Start: 2025-08-05

## 2025-08-05 RX ORDER — CLOBETASOL PROPIONATE 0.5 MG/G
OINTMENT TOPICAL PRN
OUTPATIENT
Start: 2025-08-05

## 2025-08-05 RX ORDER — SODIUM CHLOR/HYPOCHLOROUS ACID 0.033 %
SOLUTION, IRRIGATION IRRIGATION PRN
OUTPATIENT
Start: 2025-08-05

## 2025-08-05 RX ORDER — GENTAMICIN SULFATE 1 MG/G
OINTMENT TOPICAL PRN
OUTPATIENT
Start: 2025-08-05

## 2025-08-05 RX ADMIN — LIDOCAINE HYDROCHLORIDE 10 ML: 40 SOLUTION TOPICAL at 10:06

## 2025-08-05 ASSESSMENT — PAIN DESCRIPTION - FREQUENCY: FREQUENCY: INTERMITTENT

## 2025-08-05 ASSESSMENT — PAIN SCALES - GENERAL: PAINLEVEL_OUTOF10: 1

## 2025-08-05 ASSESSMENT — PAIN - FUNCTIONAL ASSESSMENT: PAIN_FUNCTIONAL_ASSESSMENT: ACTIVITIES ARE NOT PREVENTED

## 2025-08-05 ASSESSMENT — PAIN DESCRIPTION - LOCATION: LOCATION: LEG

## 2025-08-05 ASSESSMENT — PAIN DESCRIPTION - ONSET: ONSET: GRADUAL

## 2025-08-05 ASSESSMENT — PAIN DESCRIPTION - DESCRIPTORS: DESCRIPTORS: ACHING

## 2025-08-05 ASSESSMENT — PAIN DESCRIPTION - ORIENTATION: ORIENTATION: LEFT

## 2025-08-05 ASSESSMENT — PAIN DESCRIPTION - PAIN TYPE: TYPE: CHRONIC PAIN

## 2025-08-12 ENCOUNTER — HOSPITAL ENCOUNTER (OUTPATIENT)
Dept: WOUND CARE | Age: 70
Discharge: HOME | End: 2025-08-12
Attending: STUDENT IN AN ORGANIZED HEALTH CARE EDUCATION/TRAINING PROGRAM
Payer: MEDICARE

## 2025-08-12 VITALS — SYSTOLIC BLOOD PRESSURE: 192 MMHG | DIASTOLIC BLOOD PRESSURE: 100 MMHG | TEMPERATURE: 97.5 F | RESPIRATION RATE: 18 BRPM

## 2025-08-12 DIAGNOSIS — L97.222 VENOUS STASIS ULCER OF LEFT CALF WITH FAT LAYER EXPOSED WITHOUT VARICOSE VEINS (HCC): Primary | ICD-10-CM

## 2025-08-12 DIAGNOSIS — I87.2 VENOUS STASIS ULCER OF LEFT CALF WITH FAT LAYER EXPOSED WITHOUT VARICOSE VEINS (HCC): Primary | ICD-10-CM

## 2025-08-12 PROCEDURE — 11042 DBRDMT SUBQ TIS 1ST 20SQCM/<: CPT

## 2025-08-12 PROCEDURE — 11042 DBRDMT SUBQ TIS 1ST 20SQCM/<: CPT | Performed by: STUDENT IN AN ORGANIZED HEALTH CARE EDUCATION/TRAINING PROGRAM

## 2025-08-12 RX ORDER — GENTAMICIN SULFATE 1 MG/G
OINTMENT TOPICAL PRN
OUTPATIENT
Start: 2025-08-12

## 2025-08-12 RX ORDER — BACITRACIN ZINC 500 [USP'U]/G
OINTMENT TOPICAL PRN
OUTPATIENT
Start: 2025-08-12

## 2025-08-12 RX ORDER — LIDOCAINE HYDROCHLORIDE 40 MG/ML
SOLUTION TOPICAL PRN
OUTPATIENT
Start: 2025-08-12

## 2025-08-12 RX ORDER — LIDOCAINE HYDROCHLORIDE 20 MG/ML
JELLY TOPICAL PRN
OUTPATIENT
Start: 2025-08-12

## 2025-08-12 RX ORDER — NEOMYCIN/BACITRACIN/POLYMYXINB 3.5-400-5K
OINTMENT (GRAM) TOPICAL PRN
OUTPATIENT
Start: 2025-08-12

## 2025-08-12 RX ORDER — BACITRACIN ZINC AND POLYMYXIN B SULFATE 500; 1000 [USP'U]/G; [USP'U]/G
OINTMENT TOPICAL PRN
OUTPATIENT
Start: 2025-08-12

## 2025-08-12 RX ORDER — LIDOCAINE 40 MG/G
CREAM TOPICAL PRN
OUTPATIENT
Start: 2025-08-12

## 2025-08-12 RX ORDER — LIDOCAINE HYDROCHLORIDE 40 MG/ML
SOLUTION TOPICAL PRN
Status: DISCONTINUED | OUTPATIENT
Start: 2025-08-12 | End: 2025-08-13 | Stop reason: HOSPADM

## 2025-08-12 RX ORDER — CLOBETASOL PROPIONATE 0.5 MG/G
OINTMENT TOPICAL PRN
OUTPATIENT
Start: 2025-08-12

## 2025-08-12 RX ORDER — SILVER SULFADIAZINE 10 MG/G
CREAM TOPICAL PRN
OUTPATIENT
Start: 2025-08-12

## 2025-08-12 RX ORDER — LIDOCAINE 50 MG/G
OINTMENT TOPICAL PRN
OUTPATIENT
Start: 2025-08-12

## 2025-08-12 RX ORDER — MUPIROCIN 2 %
OINTMENT (GRAM) TOPICAL PRN
OUTPATIENT
Start: 2025-08-12

## 2025-08-12 RX ORDER — TRIAMCINOLONE ACETONIDE 1 MG/G
OINTMENT TOPICAL PRN
OUTPATIENT
Start: 2025-08-12

## 2025-08-12 RX ORDER — BETAMETHASONE DIPROPIONATE 0.5 MG/G
CREAM TOPICAL PRN
OUTPATIENT
Start: 2025-08-12

## 2025-08-12 RX ORDER — SODIUM CHLOR/HYPOCHLOROUS ACID 0.033 %
SOLUTION, IRRIGATION IRRIGATION PRN
OUTPATIENT
Start: 2025-08-12

## 2025-08-12 RX ADMIN — LIDOCAINE HYDROCHLORIDE 5 ML: 40 SOLUTION TOPICAL at 10:19

## 2025-08-19 ENCOUNTER — HOSPITAL ENCOUNTER (OUTPATIENT)
Dept: WOUND CARE | Age: 70
Discharge: HOME | End: 2025-08-19
Attending: STUDENT IN AN ORGANIZED HEALTH CARE EDUCATION/TRAINING PROGRAM
Payer: MEDICARE

## 2025-08-19 VITALS
RESPIRATION RATE: 18 BRPM | HEART RATE: 71 BPM | BODY MASS INDEX: 38.5 KG/M2 | HEIGHT: 71 IN | SYSTOLIC BLOOD PRESSURE: 199 MMHG | TEMPERATURE: 97.2 F | WEIGHT: 275 LBS | DIASTOLIC BLOOD PRESSURE: 102 MMHG

## 2025-08-19 DIAGNOSIS — L97.222 VENOUS STASIS ULCER OF LEFT CALF WITH FAT LAYER EXPOSED WITHOUT VARICOSE VEINS (HCC): Primary | ICD-10-CM

## 2025-08-19 DIAGNOSIS — I87.2 VENOUS STASIS ULCER OF LEFT CALF WITH FAT LAYER EXPOSED WITHOUT VARICOSE VEINS (HCC): Primary | ICD-10-CM

## 2025-08-19 PROCEDURE — 11042 DBRDMT SUBQ TIS 1ST 20SQCM/<: CPT

## 2025-08-19 PROCEDURE — 11042 DBRDMT SUBQ TIS 1ST 20SQCM/<: CPT | Performed by: NURSE PRACTITIONER

## 2025-08-19 RX ORDER — LIDOCAINE HYDROCHLORIDE 40 MG/ML
SOLUTION TOPICAL PRN
Status: DISCONTINUED | OUTPATIENT
Start: 2025-08-19 | End: 2025-08-20 | Stop reason: HOSPADM

## 2025-08-19 RX ORDER — MUPIROCIN 2 %
OINTMENT (GRAM) TOPICAL PRN
OUTPATIENT
Start: 2025-08-19

## 2025-08-19 RX ORDER — GENTAMICIN SULFATE 1 MG/G
OINTMENT TOPICAL PRN
OUTPATIENT
Start: 2025-08-19

## 2025-08-19 RX ORDER — LIDOCAINE 40 MG/G
CREAM TOPICAL PRN
OUTPATIENT
Start: 2025-08-19

## 2025-08-19 RX ORDER — BACITRACIN ZINC AND POLYMYXIN B SULFATE 500; 1000 [USP'U]/G; [USP'U]/G
OINTMENT TOPICAL PRN
OUTPATIENT
Start: 2025-08-19

## 2025-08-19 RX ORDER — TRIAMCINOLONE ACETONIDE 1 MG/G
OINTMENT TOPICAL PRN
OUTPATIENT
Start: 2025-08-19

## 2025-08-19 RX ORDER — BACITRACIN ZINC 500 [USP'U]/G
OINTMENT TOPICAL PRN
OUTPATIENT
Start: 2025-08-19

## 2025-08-19 RX ORDER — LIDOCAINE HYDROCHLORIDE 20 MG/ML
JELLY TOPICAL PRN
OUTPATIENT
Start: 2025-08-19

## 2025-08-19 RX ORDER — LIDOCAINE 50 MG/G
OINTMENT TOPICAL PRN
OUTPATIENT
Start: 2025-08-19

## 2025-08-19 RX ORDER — BETAMETHASONE DIPROPIONATE 0.5 MG/G
CREAM TOPICAL PRN
OUTPATIENT
Start: 2025-08-19

## 2025-08-19 RX ORDER — LIDOCAINE HYDROCHLORIDE 40 MG/ML
SOLUTION TOPICAL PRN
OUTPATIENT
Start: 2025-08-19

## 2025-08-19 RX ORDER — SODIUM CHLOR/HYPOCHLOROUS ACID 0.033 %
SOLUTION, IRRIGATION IRRIGATION PRN
OUTPATIENT
Start: 2025-08-19

## 2025-08-19 RX ORDER — CLOBETASOL PROPIONATE 0.5 MG/G
OINTMENT TOPICAL PRN
OUTPATIENT
Start: 2025-08-19

## 2025-08-19 RX ORDER — SILVER SULFADIAZINE 10 MG/G
CREAM TOPICAL PRN
OUTPATIENT
Start: 2025-08-19

## 2025-08-19 RX ORDER — NEOMYCIN/BACITRACIN/POLYMYXINB 3.5-400-5K
OINTMENT (GRAM) TOPICAL PRN
OUTPATIENT
Start: 2025-08-19

## 2025-08-19 RX ADMIN — LIDOCAINE HYDROCHLORIDE 10 ML: 40 SOLUTION TOPICAL at 08:20

## 2025-08-19 ASSESSMENT — PAIN SCALES - GENERAL: PAINLEVEL_OUTOF10: 0

## 2025-08-26 ENCOUNTER — HOSPITAL ENCOUNTER (OUTPATIENT)
Dept: WOUND CARE | Age: 70
Discharge: HOME | End: 2025-08-26
Attending: STUDENT IN AN ORGANIZED HEALTH CARE EDUCATION/TRAINING PROGRAM
Payer: MEDICARE

## 2025-08-26 VITALS
DIASTOLIC BLOOD PRESSURE: 84 MMHG | HEIGHT: 71 IN | TEMPERATURE: 96.7 F | BODY MASS INDEX: 38.5 KG/M2 | SYSTOLIC BLOOD PRESSURE: 148 MMHG | HEART RATE: 72 BPM | WEIGHT: 275 LBS | RESPIRATION RATE: 20 BRPM

## 2025-08-26 DIAGNOSIS — L97.222 VENOUS STASIS ULCER OF LEFT CALF WITH FAT LAYER EXPOSED WITHOUT VARICOSE VEINS (HCC): Primary | ICD-10-CM

## 2025-08-26 DIAGNOSIS — I87.2 VENOUS STASIS ULCER OF LEFT CALF WITH FAT LAYER EXPOSED WITHOUT VARICOSE VEINS (HCC): Primary | ICD-10-CM

## 2025-08-26 PROCEDURE — 11042 DBRDMT SUBQ TIS 1ST 20SQCM/<: CPT

## 2025-08-26 PROCEDURE — 11042 DBRDMT SUBQ TIS 1ST 20SQCM/<: CPT | Performed by: STUDENT IN AN ORGANIZED HEALTH CARE EDUCATION/TRAINING PROGRAM

## 2025-08-26 RX ORDER — LIDOCAINE 40 MG/G
CREAM TOPICAL PRN
OUTPATIENT
Start: 2025-08-26

## 2025-08-26 RX ORDER — LIDOCAINE HYDROCHLORIDE 40 MG/ML
SOLUTION TOPICAL PRN
OUTPATIENT
Start: 2025-08-26

## 2025-08-26 RX ORDER — NEOMYCIN/BACITRACIN/POLYMYXINB 3.5-400-5K
OINTMENT (GRAM) TOPICAL PRN
OUTPATIENT
Start: 2025-08-26

## 2025-08-26 RX ORDER — LIDOCAINE 50 MG/G
OINTMENT TOPICAL PRN
OUTPATIENT
Start: 2025-08-26

## 2025-08-26 RX ORDER — CLOBETASOL PROPIONATE 0.5 MG/G
OINTMENT TOPICAL PRN
OUTPATIENT
Start: 2025-08-26

## 2025-08-26 RX ORDER — LIDOCAINE HYDROCHLORIDE 20 MG/ML
JELLY TOPICAL PRN
OUTPATIENT
Start: 2025-08-26

## 2025-08-26 RX ORDER — SODIUM CHLOR/HYPOCHLOROUS ACID 0.033 %
SOLUTION, IRRIGATION IRRIGATION PRN
OUTPATIENT
Start: 2025-08-26

## 2025-08-26 RX ORDER — MUPIROCIN 2 %
OINTMENT (GRAM) TOPICAL PRN
OUTPATIENT
Start: 2025-08-26

## 2025-08-26 RX ORDER — TRIAMCINOLONE ACETONIDE 1 MG/G
OINTMENT TOPICAL PRN
OUTPATIENT
Start: 2025-08-26

## 2025-08-26 RX ORDER — GENTAMICIN SULFATE 1 MG/G
OINTMENT TOPICAL PRN
OUTPATIENT
Start: 2025-08-26

## 2025-08-26 RX ORDER — SILVER SULFADIAZINE 10 MG/G
CREAM TOPICAL PRN
OUTPATIENT
Start: 2025-08-26

## 2025-08-26 RX ORDER — BACITRACIN ZINC 500 [USP'U]/G
OINTMENT TOPICAL PRN
OUTPATIENT
Start: 2025-08-26

## 2025-08-26 RX ORDER — BACITRACIN ZINC AND POLYMYXIN B SULFATE 500; 1000 [USP'U]/G; [USP'U]/G
OINTMENT TOPICAL PRN
OUTPATIENT
Start: 2025-08-26

## 2025-08-26 RX ORDER — BETAMETHASONE DIPROPIONATE 0.5 MG/G
CREAM TOPICAL PRN
OUTPATIENT
Start: 2025-08-26

## 2025-08-26 RX ORDER — LIDOCAINE HYDROCHLORIDE 40 MG/ML
SOLUTION TOPICAL PRN
Status: DISCONTINUED | OUTPATIENT
Start: 2025-08-26 | End: 2025-08-27 | Stop reason: HOSPADM

## 2025-08-26 RX ADMIN — LIDOCAINE HYDROCHLORIDE 10 ML: 40 SOLUTION TOPICAL at 08:23

## 2025-08-26 ASSESSMENT — PAIN SCALES - GENERAL: PAINLEVEL_OUTOF10: 0

## 2025-09-02 ENCOUNTER — HOSPITAL ENCOUNTER (OUTPATIENT)
Dept: WOUND CARE | Age: 70
Discharge: HOME | End: 2025-09-02
Attending: STUDENT IN AN ORGANIZED HEALTH CARE EDUCATION/TRAINING PROGRAM
Payer: MEDICARE

## 2025-09-02 VITALS
RESPIRATION RATE: 22 BRPM | DIASTOLIC BLOOD PRESSURE: 115 MMHG | BODY MASS INDEX: 38.5 KG/M2 | WEIGHT: 275 LBS | SYSTOLIC BLOOD PRESSURE: 197 MMHG | HEIGHT: 71 IN | TEMPERATURE: 96.9 F

## 2025-09-02 DIAGNOSIS — L97.222 VENOUS STASIS ULCER OF LEFT CALF WITH FAT LAYER EXPOSED WITHOUT VARICOSE VEINS (HCC): Primary | ICD-10-CM

## 2025-09-02 DIAGNOSIS — I87.2 VENOUS STASIS ULCER OF LEFT CALF WITH FAT LAYER EXPOSED WITHOUT VARICOSE VEINS (HCC): Primary | ICD-10-CM

## 2025-09-02 PROCEDURE — 11042 DBRDMT SUBQ TIS 1ST 20SQCM/<: CPT | Performed by: STUDENT IN AN ORGANIZED HEALTH CARE EDUCATION/TRAINING PROGRAM

## 2025-09-02 PROCEDURE — 11042 DBRDMT SUBQ TIS 1ST 20SQCM/<: CPT

## 2025-09-02 RX ORDER — LIDOCAINE HYDROCHLORIDE 40 MG/ML
SOLUTION TOPICAL PRN
OUTPATIENT
Start: 2025-09-02

## 2025-09-02 RX ORDER — LIDOCAINE 50 MG/G
OINTMENT TOPICAL PRN
OUTPATIENT
Start: 2025-09-02

## 2025-09-02 RX ORDER — CLOBETASOL PROPIONATE 0.5 MG/G
OINTMENT TOPICAL PRN
OUTPATIENT
Start: 2025-09-02

## 2025-09-02 RX ORDER — BACITRACIN ZINC 500 [USP'U]/G
OINTMENT TOPICAL PRN
OUTPATIENT
Start: 2025-09-02

## 2025-09-02 RX ORDER — SILVER SULFADIAZINE 10 MG/G
CREAM TOPICAL PRN
OUTPATIENT
Start: 2025-09-02

## 2025-09-02 RX ORDER — TRIAMCINOLONE ACETONIDE 1 MG/G
OINTMENT TOPICAL PRN
OUTPATIENT
Start: 2025-09-02

## 2025-09-02 RX ORDER — MUPIROCIN 2 %
OINTMENT (GRAM) TOPICAL PRN
OUTPATIENT
Start: 2025-09-02

## 2025-09-02 RX ORDER — LIDOCAINE HYDROCHLORIDE 20 MG/ML
JELLY TOPICAL PRN
OUTPATIENT
Start: 2025-09-02

## 2025-09-02 RX ORDER — BACITRACIN ZINC AND POLYMYXIN B SULFATE 500; 1000 [USP'U]/G; [USP'U]/G
OINTMENT TOPICAL PRN
OUTPATIENT
Start: 2025-09-02

## 2025-09-02 RX ORDER — GENTAMICIN SULFATE 1 MG/G
OINTMENT TOPICAL PRN
OUTPATIENT
Start: 2025-09-02

## 2025-09-02 RX ORDER — BETAMETHASONE DIPROPIONATE 0.5 MG/G
CREAM TOPICAL PRN
OUTPATIENT
Start: 2025-09-02

## 2025-09-02 RX ORDER — LIDOCAINE HYDROCHLORIDE 40 MG/ML
SOLUTION TOPICAL PRN
Status: DISCONTINUED | OUTPATIENT
Start: 2025-09-02 | End: 2025-09-03 | Stop reason: HOSPADM

## 2025-09-02 RX ORDER — LIDOCAINE 40 MG/G
CREAM TOPICAL PRN
OUTPATIENT
Start: 2025-09-02

## 2025-09-02 RX ORDER — NEOMYCIN/BACITRACIN/POLYMYXINB 3.5-400-5K
OINTMENT (GRAM) TOPICAL PRN
OUTPATIENT
Start: 2025-09-02

## 2025-09-02 RX ORDER — SODIUM CHLOR/HYPOCHLOROUS ACID 0.033 %
SOLUTION, IRRIGATION IRRIGATION PRN
OUTPATIENT
Start: 2025-09-02

## 2025-09-02 RX ADMIN — LIDOCAINE HYDROCHLORIDE 5 ML: 40 SOLUTION TOPICAL at 08:13

## 2025-09-02 ASSESSMENT — PAIN SCALES - GENERAL: PAINLEVEL_OUTOF10: 0

## (undated) DEVICE — PREMIUM WET SKIN PREP TRAY: Brand: MEDLINE INDUSTRIES, INC.

## (undated) DEVICE — BANDAGE,GAUZE,4.5"X4.1YD,STERILE,LF: Brand: MEDLINE

## (undated) DEVICE — DECANTER BAG 9": Brand: MEDLINE INDUSTRIES, INC.

## (undated) DEVICE — DRESSING ANTIMIC ALG OPTICELL GELLING FBR 6X6IN

## (undated) DEVICE — CANISTER NEG PRSS 1000ML W/ GEL INFOVAC

## (undated) DEVICE — STRIP,CLOSURE,WOUND,MEDI-STRIP,1/2X4: Brand: MEDLINE

## (undated) DEVICE — MINOR VASCULAR: Brand: MEDLINE INDUSTRIES, INC.

## (undated) DEVICE — DRESSING KIT INCISION MGMT 20 CM PREVENA + DUO PEEL PLACE

## (undated) DEVICE — SOLUTION IRRIG 1000ML 0.9% SOD CHL USP POUR PLAS BTL

## (undated) DEVICE — DRESSING PETRO W3XL8IN OIL EMUL N ADH GZ KNIT IMPREG CELOS

## (undated) DEVICE — ELECTRODE PT RET AD L9FT HI MOIST COND ADH HYDRGEL CORDED

## (undated) DEVICE — SPLINT KNEE UNIV FOR LESS THAN 36IN L24IN FOAM LAM E CNTCT

## (undated) DEVICE — PACK,HEAVY DRAINAGE,STERILE: Brand: MEDLINE INDUSTRIES, INC.

## (undated) DEVICE — GOWN,SIRUS,FABRNF,XL,20/CS: Brand: MEDLINE

## (undated) DEVICE — CONVERTORS STOCKINETTE: Brand: CONVERTORS

## (undated) DEVICE — DRESSING AQUACEL ADVANTAGE ALG W4XL5IN RECT GREATER ABSRB HYDRFBR TECHNOLOGY

## (undated) DEVICE — BLADE,STAINLESS-STEEL,10,STRL,DISPOSABLE: Brand: MEDLINE

## (undated) DEVICE — TOWEL,OR,DSP,ST,BLUE,STD,6/PK,12PK/CS: Brand: MEDLINE

## (undated) DEVICE — BANDAGE COMPR W4INXL10YD WHITE/BEIGE E MTRX HK LOOP CLSR

## (undated) DEVICE — DRESSING ALG WND OPTICELL GELLING FBR 4.25X4.25IN OPTICELL

## (undated) DEVICE — RECIPROCATING BLADE HEAVY DUTY (52.6 X 0.64MM)

## (undated) DEVICE — 1LYRTR 16FR10ML100%SIL UMS SNP: Brand: MEDLINE INDUSTRIES, INC.

## (undated) DEVICE — GAUZE,SPONGE,AVANT,4"X4",4PLY,STRL,10/TR: Brand: MEDLINE

## (undated) DEVICE — LOWER EXT KNEE DRAPE: Brand: MEDLINE INDUSTRIES, INC.

## (undated) DEVICE — KERLIX STER GAUZ 225INX3YDS/RL

## (undated) DEVICE — STANDARD LATEX FREE WOVEN ELASTIC BANDAGE 3INX4.5YD

## (undated) DEVICE — GLOVE ORANGE PI 8   MSG9080

## (undated) DEVICE — SPONGE LAP W18XL18IN WHT COT 4 PLY FLD STRUNG RADPQ DISP ST 2 PER PACK

## (undated) DEVICE — TRAP,MUCUS SPECIMEN,40CC: Brand: MEDLINE

## (undated) DEVICE — GLOVE ORANGE PI 7 1/2   MSG9075

## (undated) DEVICE — DRESSING,CONTACT LAYER,VERSATEL ONE,4X7: Brand: MEDLINE INDUSTRIES, INC.

## (undated) DEVICE — SWAB SPEC COLL SHFT L5.25IN POLYUR FOAM TIP SFT DBL MEDIA